# Patient Record
Sex: MALE | Race: WHITE | NOT HISPANIC OR LATINO | Employment: FULL TIME | URBAN - METROPOLITAN AREA
[De-identification: names, ages, dates, MRNs, and addresses within clinical notes are randomized per-mention and may not be internally consistent; named-entity substitution may affect disease eponyms.]

---

## 2017-01-16 ENCOUNTER — OFFICE VISIT (OUTPATIENT)
Dept: FAMILY MEDICINE | Facility: CLINIC | Age: 62
End: 2017-01-16
Payer: COMMERCIAL

## 2017-01-16 VITALS
BODY MASS INDEX: 33.47 KG/M2 | WEIGHT: 226 LBS | SYSTOLIC BLOOD PRESSURE: 130 MMHG | HEIGHT: 69 IN | HEART RATE: 66 BPM | DIASTOLIC BLOOD PRESSURE: 82 MMHG | TEMPERATURE: 98.5 F | OXYGEN SATURATION: 97 %

## 2017-01-16 DIAGNOSIS — J01.01 ACUTE RECURRENT MAXILLARY SINUSITIS: Primary | ICD-10-CM

## 2017-01-16 PROCEDURE — 99213 OFFICE O/P EST LOW 20 MIN: CPT | Performed by: PHYSICIAN ASSISTANT

## 2017-01-16 RX ORDER — AZITHROMYCIN 250 MG/1
TABLET, FILM COATED ORAL
Qty: 6 TABLET | Refills: 0 | Status: SHIPPED | OUTPATIENT
Start: 2017-01-16 | End: 2017-02-09

## 2017-01-16 NOTE — NURSING NOTE
"Chief Complaint   Patient presents with     Nasal Congestion       Initial /82 mmHg  Pulse 66  Temp(Src) 98.5  F (36.9  C) (Oral)  Ht 5' 9\" (1.753 m)  Wt 226 lb (102.513 kg)  BMI 33.36 kg/m2  SpO2 97% Estimated body mass index is 33.36 kg/(m^2) as calculated from the following:    Height as of this encounter: 5' 9\" (1.753 m).    Weight as of this encounter: 226 lb (102.513 kg).  BP completed using cuff size: regular    Darlene Kat MA      "

## 2017-01-16 NOTE — PATIENT INSTRUCTIONS
-Tdap due in May   -Afrin: twice daily for 3 days  Sinusitis (Antibiotic Treatment)    The sinuses are air-filled spaces within the bones of the face. They connect to the inside of the nose. Sinusitis is an inflammation of the tissue lining the sinus cavity. Sinus inflammation can occur during a cold. It can also be due to allergies to pollens and other particles in the air. Sinusitis can cause symptoms of sinus congestion and fullness. A sinus infection causes fever, headache and facial pain. There is often green or yellow drainage from the nose or into the back of the throat (post-nasal drip). You have been given antibiotics to treat this condition.  Home care:    Take the full course of antibiotics as instructed. Do not stop taking them, even if you feel better.    Drink plenty of water, hot tea, and other liquids. This may help thin mucus. It also may promote sinus drainage.    Heat may help soothe painful areas of the face. Use a towel soaked in hot water. Or,  the shower and direct the hot spray onto your face. Using a vaporizer along with a menthol rub at night may also help.     An expectorant containing guaifenesin may help thin the mucus and promote drainage from the sinuses.    Over-the-counter decongestants may be used unless a similar medicine was prescribed. Nasal sprays work the fastest. Use one that contains phenylephrine or oxymetazoline. First blow the nose gently. Then use the spray. Do not use these medicines more often than directed on the label or symptoms may get worse. You may also use tablets containing pseudoephedrine. Avoid products that combine ingredients, because side effects may be increased. Read labels. You can also ask the pharmacist for help. (NOTE: Persons with high blood pressure should not use decongestants. They can raise blood pressure.)    Over-the-counter antihistamines may help if allergies contributed to your sinusitis.      Do not use nasal rinses or irrigation  during an acute sinus infection, unless told to by your health care provider. Rinsing may spread the infection to other sinuses.    Use acetaminophen or ibuprofen to control pain, unless another pain medicine was prescribed. (If you have chronic liver or kidney disease or ever had a stomach ulcer, talk with your doctor before using these medicines. Aspirin should never be used in anyone under 18 years of age who is ill with a fever. It may cause severe liver damage.)    Don't smoke. This can worsen symptoms.  Follow-up care  Follow up with your healthcare provider or our staff if you are not improving within the next week.  When to seek medical advice  Call your healthcare provider if any of these occur:    Facial pain or headache becoming more severe    Stiff neck    Unusual drowsiness or confusion    Swelling of the forehead or eyelids    Vision problems, including blurred or double vision    Fever of 100.4 F (38 C) or higher, or as directed by your healthcare provider    Seizure    Breathing problems    Symptoms not resolving within 10 days    4797-1285 [employer]. [address] [place] [address], [place] [address]. All rights reserved. This information is not intended as a substitute for professional medical care. Always follow your healthcare professional's instructions.

## 2017-01-16 NOTE — MR AVS SNAPSHOT
After Visit Summary   1/16/2017    Nick Villalobos    MRN: [id number]           Patient Information     Date Of Birth          1955        Visit Information        Provider Department      1/16/2017 1:40 PM Iv[facility] Joiner [facility]-C [facility]        Today's Diagnoses     Acute recurrent maxillary sinusitis    -  1       Care Instructions      -Tdap due in May   -Afrin: twice daily for 3 days  Sinusitis (Antibiotic Treatment)    The sinuses are air-filled spaces within the bones of the face. They connect to the inside of the nose. Sinusitis is an inflammation of the tissue lining the sinus cavity. Sinus inflammation can occur during a cold. It can also be due to allergies to pollens and other particles in the air. Sinusitis can cause symptoms of sinus congestion and fullness. A sinus infection causes fever, headache and facial pain. There is often green or yellow drainage from the nose or into the back of the throat (post-nasal drip). You have been given antibiotics to treat this condition.  Home care:    Take the full course of antibiotics as instructed. Do not stop taking them, even if you feel better.    Drink plenty of water, hot tea, and other liquids. This may help thin mucus. It also may promote sinus drainage.    Heat may help soothe painful areas of the face. Use a towel soaked in hot water. Or,  the shower and direct the hot spray onto your face. Using a vaporizer along with a menthol rub at night may also help.     An expectorant containing guaifenesin may help thin the mucus and promote drainage from the sinuses.    Over-the-counter decongestants may be used unless a similar medicine was prescribed. Nasal sprays work the fastest. Use one that contains phenylephrine or oxymetazoline. First blow the nose gently. Then use the spray. Do not use these medicines more often than directed on the label or symptoms may get worse. You may also use tablets containing  pseudoephedrine. Avoid products that combine ingredients, because side effects may be increased. Read labels. You can also ask the pharmacist for help. (NOTE: Persons with high blood pressure should not use decongestants. They can raise blood pressure.)    Over-the-counter antihistamines may help if allergies contributed to your sinusitis.      Do not use nasal rinses or irrigation during an acute sinus infection, unless told to by your health care provider. Rinsing may spread the infection to other sinuses.    Use acetaminophen or ibuprofen to control pain, unless another pain medicine was prescribed. (If you have chronic liver or kidney disease or ever had a stomach ulcer, talk with your doctor before using these medicines. Aspirin should never be used in anyone under 18 years of age who is ill with a fever. It may cause severe liver damage.)    Don't smoke. This can worsen symptoms.  Follow-up care  Follow up with your healthcare provider or our staff if you are not improving within the next week.  When to seek medical advice  Call your healthcare provider if any of these occur:    Facial pain or headache becoming more severe    Stiff neck    Unusual drowsiness or confusion    Swelling of the forehead or eyelids    Vision problems, including blurred or double vision    Fever of 100.4 F (38 C) or higher, or as directed by your healthcare provider    Seizure    Breathing problems    Symptoms not resolving within 10 days    4255-8377 [employer]. [address] [place] [address], [place] [address]. All rights reserved. This information is not intended as a substitute for professional medical care. Always follow your healthcare professional's instructions.              Follow-ups after your visit        Who to contact     If you have questions or need follow up information about today's clinic visit or your schedule please contact [facility] directly at [phone].  Normal or non-critical lab and  "imaging results will be communicated to you by MyChart, letter or phone within 4 business days after the clinic has received the results. If you do not hear from us within 7 days, please contact the clinic through [employer] or phone. If you have a critical or abnormal lab result, we will notify you by phone as soon as possible.  Submit refill requests through i[employer] or call your pharmacy and they will forward the refill request to us. Please allow 3 business days for your refill to be completed.          Additional Information About Your Visit        iNEWiT Information     iNEW[employer]T lets you send messages to your doctor, view your test results, renew your prescriptions, schedule appointments and more. To sign up, go to [email or web address] . Click on \"Log in\" on the left side of the screen, which will take you to the Welcome page. Then click on \"Sign up Now\" on the right side of the page.     You will be asked to enter the access code listed below, as well as some personal information. Please follow the directions to create your username and password.     Your access code is: [email or web address]  Expires: 2017  2:03 PM     Your access code will  in 90 days. If you need help or a new code, please call your [facility] clinic or [phone].        Care EveryWhere ID     This is your Care EveryWhere ID. This could be used by other organizations to access your [facility] medical records  [id number]        Your Vitals Were     Pulse Temperature Height BMI (Body Mass Index) Pulse Oximetry       66 98.5  F (36.9  C) (Oral) 5' 9\" (1.753 m) 33.36 kg/m2 97%        Blood Pressure from Last 3 Encounters:   17 130/82   10/06/16 110/60   16 122/68    Weight from Last 3 Encounters:   17 226 lb (102.513 kg)   10/06/16 213 lb (96.616 kg)   16 207 lb (93.895 kg)              Today, you had the following     No orders found for display         Today's Medication Changes          These changes are accurate " as of: 1/16/17  2:03 PM.  If you have any questions, ask your nurse or doctor.               Start taking these medicines.        Dose/Directions    azithromycin 250 MG tablet   Commonly known as:  ZITHROMAX   Used for:  Acute recurrent maxillary sinusitis   Started by:  Ivory Joiner PA-C        Two tablets first day, then one tablet daily for four days.   Quantity:  6 tablet   Refills:  0            Where to get your medicines      These medications were sent to [facility] [address] [facility] - [place] - [address]  [address], [place] [address]     Phone:  [phone]    - azithromycin 250 MG tablet             Primary Care Provider Office Phone # Fax #    Karen Weiler, [phone]       [facility] [address] [facility] [address]        Thank you!     Thank you for choosing [facility]  for your care. Our goal is always to provide you with excellent care. Hearing back from our patients is one way we can continue to improve our services. Please take a few minutes to complete the written survey that you may receive in the mail after your visit with us. Thank you!             Your Updated Medication List - Protect others around you: Learn how to safely use, store and throw away your medicines at www.disposemymeds.org.          This list is accurate as of: 1/16/17  2:03 PM.  Always use your most recent med list.                   Brand Name Dispense Instructions for use    azithromycin 250 MG tablet    ZITHROMAX    6 tablet    Two tablets first day, then one tablet daily for four days.       diazepam 5 MG tablet    VALIUM    30 tablet    Take 1 tablet (5 mg) by mouth every 8 hours as needed for anxiety or sleep (muscle spasm)

## 2017-01-16 NOTE — PROGRESS NOTES
SUBJECTIVE:                                                    Nick Villalobos is a 61 year old male who presents to clinic today for the following health issues:    Acute Illness   Acute illness concerns: flu, congestion  Onset: 6 days     Fever: YES- low grade. Max 99    Chills/Sweats: no    Headache (location?): YES    Sinus Pressure:YES - teeth pain    Conjunctivitis:  no    Ear Pain: YES- itching    Rhinorrhea: YES - off and on    Congestion: YES    Sore Throat: no, but had at the beginning     Cough: YES-productive of green sputum    Wheeze: YES    Decreased Appetite: no    Nausea: no    Vomiting: no    Diarrhea:  no    Dysuria/Freq.: no    Fatigue/Achiness: YES    Sick/Strep Exposure: YES- people sick at work     Therapies Tried and outcome: mucinex DM, waylon seltzer, ibuprofen, vitamins    Symptoms worsening rather than improving.  Tooth pain x2 days  Facial pain worse with leaning forward  Feels short of breath    No recent travel.    Sick contacts at work  Wife had cold symptoms a few weeks ago.    Patient reports history of sinus infections.    Problem list and histories reviewed & adjusted, as indicated.  Additional history: as documented    Patient Active Problem List   Diagnosis     Family history of malignant neoplasm of prostate     Esophageal reflux     Advanced directives, counseling/discussion     Colon polyps     Hyperlipidemia LDL goal <160     Sinus bradycardia     Past Surgical History   Procedure Laterality Date     Surgical history of -        tendon repair in right hand     Hc colonoscopy thru stoma, diagnostic  6/15/07     Hyperplastic Polyp.  Needs repeat in 5 years       Social History   Substance Use Topics     Smoking status: Former Smoker -- 30 years     Quit date: 12/20/2001     Smokeless tobacco: Never Used     Alcohol Use: Yes      Comment: 2x per month     Family History   Problem Relation Age of Onset     Prostate Cancer Father      Cancer - colorectal Father      C.A.D.  "Father       at 67, presumed MI     Hypertension Father      CEREBROVASCULAR DISEASE Mother      at 64         Current Outpatient Prescriptions   Medication Sig Dispense Refill     azithromycin (ZITHROMAX) 250 MG tablet Two tablets first day, then one tablet daily for four days. 6 tablet 0     diazepam (VALIUM) 5 MG tablet Take 1 tablet (5 mg) by mouth every 8 hours as needed for anxiety or sleep (muscle spasm) 30 tablet 0     No Known Allergies    ROS:  Constitutional, HEENT, cardiovascular, pulmonary, gi and gu systems are negative, except as otherwise noted.    OBJECTIVE:                                                    /82 mmHg  Pulse 66  Temp(Src) 98.5  F (36.9  C) (Oral)  Ht 5' 9\" (1.753 m)  Wt 226 lb (102.513 kg)  BMI 33.36 kg/m2  SpO2 97%  Body mass index is 33.36 kg/(m^2).  GENERAL: healthy, alert and no distress  EYES: Eyes grossly normal to inspection, PERRL and conjunctivae and sclerae normal  HENT: normal cephalic/atraumatic, ear canals and TM's normal, nose and mouth without ulcers or lesions, oropharynx clear, oral mucous membranes moist and sinuses: maxillary tenderness on both sides  NECK: no adenopathy, no asymmetry, masses, or scars and thyroid normal to palpation  RESP: expiratory wheezes L upper posterior. No rales or rhonchi.  CV: regular rate and rhythm, normal S1 S2, no S3 or S4, no murmur, click or rub, no peripheral edema and peripheral pulses strong  MS: no gross musculoskeletal defects noted, no edema  SKIN: Superficial abrasion superior to left eyebrow    Diagnostic Test Results:  none      ASSESSMENT/PLAN:                                                      1. Acute recurrent maxillary sinusitis  Symptoms worsening despite OTC medications. Tooth pain is concerning for sinus infection. Will start antibiotic. Patient advised to continue with OTC medications to help with symptoms. Follow-up if no improvement with treatment.  - azithromycin (ZITHROMAX) 250 MG tablet; Two " tablets first day, then one tablet daily for four days.  Dispense: 6 tablet; Refill: 0    Patient notified that Tdap due this year.    See Patient Instructions    Ivory Joiner PA-C  [facility]

## 2017-01-20 ENCOUNTER — TELEPHONE (OUTPATIENT)
Dept: FAMILY MEDICINE | Facility: CLINIC | Age: 62
End: 2017-01-20

## 2017-01-20 NOTE — Clinical Note
January 20, 2017      Nick Villalobos  [address] [address]          To Whom it May Concern,    I evaluated Nick Villalobos on 1/16/17. He was unable to attend work from 1/16/17 through 1/20/17 due to illness. He is able to return to work on Monday, January 23rd.    Sincerely,    Ivory Joiner PA-C

## 2017-01-20 NOTE — TELEPHONE ENCOUNTER
I would not recommend a refill on the azithromycin at this time. I would advise continued use of OTC medications to help with symptoms.  I will write a letter for work for him.    If his symptoms are not improving over the next 4-5 days, he should contact the clinic with an update, and we can determine if further treatment or evaluation is indicated.    Letter is at [address]. Please fax to number listed.    Ivory Joiner PA-C

## 2017-01-20 NOTE — TELEPHONE ENCOUNTER
I reviewed below with patient. He still really wants antibiotics, I explained to him rationale for not continuing to give antibiotics. He says he doesn't understand why we can't do this over the phone. Explained it is for his safety and he needs to let the antibiotics he has taken a chance to work. Offered him appointment for follow up but he declined. He was pleasant but still expressed his that he feels we should sent in another round of antibiotics for him. Reviewed with DONNA and RICO--no plan change.     Advised patient to call us back in 4-5 days if he is not better or to call us if he is worsening. Patient verbalized understanding. Analia Rosenberg R.N.

## 2017-01-20 NOTE — TELEPHONE ENCOUNTER
Pt calling as she is going to finish his Z-pack today and is asking of he can get a refill of it as he is not felling better.  He states he is aware it still works for 5 days after he is done taking it, but he is still coughing quite a bit.  He uses [facility] in [place].  He can be reached at [phone].  Also, he has missed work, so he needs a note saying he is okay to go back to work and to cover the days he missed.  He missed Monday-Friday this week.  Asking we fax that to his wife at [phone].  OK to leave detailed message.  Ange Ellsworth  Patient

## 2017-02-09 ENCOUNTER — OFFICE VISIT (OUTPATIENT)
Dept: FAMILY MEDICINE | Facility: CLINIC | Age: 62
End: 2017-02-09
Payer: COMMERCIAL

## 2017-02-09 VITALS
BODY MASS INDEX: 33.18 KG/M2 | HEART RATE: 79 BPM | TEMPERATURE: 98.5 F | OXYGEN SATURATION: 99 % | HEIGHT: 69 IN | SYSTOLIC BLOOD PRESSURE: 128 MMHG | DIASTOLIC BLOOD PRESSURE: 80 MMHG | WEIGHT: 224 LBS

## 2017-02-09 DIAGNOSIS — J01.00 ACUTE NON-RECURRENT MAXILLARY SINUSITIS: Primary | ICD-10-CM

## 2017-02-09 PROCEDURE — 99213 OFFICE O/P EST LOW 20 MIN: CPT | Performed by: FAMILY MEDICINE

## 2017-02-09 RX ORDER — PREDNISONE 20 MG/1
20 TABLET ORAL DAILY
Qty: 5 TABLET | Refills: 0 | Status: SHIPPED | OUTPATIENT
Start: 2017-02-09 | End: 2017-07-14

## 2017-02-09 RX ORDER — AZITHROMYCIN 250 MG/1
TABLET, FILM COATED ORAL
Qty: 6 TABLET | Refills: 0 | Status: SHIPPED | OUTPATIENT
Start: 2017-02-09 | End: 2017-07-14

## 2017-02-09 NOTE — NURSING NOTE
"Chief Complaint   Patient presents with     URI       Initial /80 mmHg  Pulse 79  Temp(Src) 98.5  F (36.9  C) (Oral)  Ht 5' 9\" (1.753 m)  Wt 224 lb (101.606 kg)  BMI 33.06 kg/m2  SpO2 99% Estimated body mass index is 33.06 kg/(m^2) as calculated from the following:    Height as of this encounter: 5' 9\" (1.753 m).    Weight as of this encounter: 224 lb (101.606 kg).  Medication Reconciliation: complete   Portia Vilchis Medical Assistant      "

## 2017-02-09 NOTE — ASSESSMENT & PLAN NOTE
Advance Care Planning 2/9/2017: ACP Review of Chart / Resources Provided:  Reviewed chart for advance care plan.  Nick Villalobos has no plan or code status on file. Discussed available resources and provided with information. Confirmed code status reflects current choices pending further ACP discussions.  Confirmed/documented legally designated decision makers.  Added by Portia Vilchis

## 2017-02-09 NOTE — PROGRESS NOTES
"  SUBJECTIVE:                                                    Nick Villalobos is a 61 year old male who presents to clinic today for the following health issues:    Has a about a 3-4 week history of sinus pain, nasal congestion. Bad sore throat.  Productive cough.  Green nasal congestion.  Having some wheezing.  Having some subjective fevers.  Multiple ill contacts at work. Was on Zithromax in 1/16/2017, felt a bit better, but symptoms got worse.  No nausea or vomiting.  Going to [place] so wants to feel better. Is also having some neck and back pain due to cough.  OTC cough medicine did not help.     Problem list and histories reviewed & adjusted, as indicated.  Additional history: as documented    ROS:  Constitutional, HEENT, cardiovascular, pulmonary, gi and gu systems are negative, except as otherwise noted.     OBJECTIVE:                                                    /80 mmHg  Pulse 79  Temp(Src) 98.5  F (36.9  C) (Oral)  Ht 5' 9\" (1.753 m)  Wt 224 lb (101.606 kg)  BMI 33.06 kg/m2  SpO2 99%  Body mass index is 33.06 kg/(m^2).  GENERAL: healthy, alert and no distress  EYES: Eyes grossly normal to inspection, PERRL and conjunctivae and sclerae normal  HENT: ear canals and TM's normal, nose and mouth without ulcers or lesions.  +maxillary sinus tenderness  NECK: no adenopathy, no asymmetry, masses, or scars and thyroid normal to palpation  RESP: lungs clear to auscultation - no rales, rhonchi or wheezes  CV: regular rate and rhythm, normal S1 S2, no S3 or S4, no murmur, click or rub, no peripheral edema and peripheral pulses strong  ABDOMEN: soft, nontender, no hepatosplenomegaly, no masses and bowel sounds normal  MS: no gross musculoskeletal defects noted, no edema         ASSESSMENT/PLAN:                                                        ICD-10-CM    1. Acute non-recurrent maxillary sinusitis J01.00 azithromycin (ZITHROMAX) 250 MG tablet     predniSONE (DELTASONE) 20 MG tablet "         Karen Weiler, MD  [facility]

## 2017-03-23 DIAGNOSIS — M25.562 LEFT KNEE PAIN, UNSPECIFIED CHRONICITY: ICD-10-CM

## 2017-03-23 RX ORDER — DIAZEPAM 5 MG
5 TABLET ORAL EVERY 8 HOURS PRN
Qty: 30 TABLET | Refills: 0 | Status: SHIPPED | OUTPATIENT
Start: 2017-03-23 | End: 2017-07-20

## 2017-03-23 NOTE — TELEPHONE ENCOUNTER
Controlled Substance Refill Request for diazepam  Problem List Complete:  Yes    Last Written Prescription Date:  1/30/2016  Last Fill Quantity: 30,   # refills: 0    [place] [facility] [place]: 2/9/2017    Clinic visit frequency required: not listed     Future Office visit:     Controlled substance agreement on file: No.     Processing:  Fax Rx to pended pharmacy.  Please call pt at [phone].  OK to leave detailed message.  Ange Ellsworth  Patient

## 2017-07-14 ENCOUNTER — OFFICE VISIT (OUTPATIENT)
Dept: FAMILY MEDICINE | Facility: CLINIC | Age: 62
End: 2017-07-14
Payer: COMMERCIAL

## 2017-07-14 ENCOUNTER — RADIANT APPOINTMENT (OUTPATIENT)
Dept: GENERAL RADIOLOGY | Facility: CLINIC | Age: 62
End: 2017-07-14
Attending: FAMILY MEDICINE
Payer: COMMERCIAL

## 2017-07-14 VITALS
HEART RATE: 72 BPM | BODY MASS INDEX: 30.36 KG/M2 | WEIGHT: 205 LBS | SYSTOLIC BLOOD PRESSURE: 120 MMHG | DIASTOLIC BLOOD PRESSURE: 66 MMHG | TEMPERATURE: 98.7 F | OXYGEN SATURATION: 98 % | HEIGHT: 69 IN

## 2017-07-14 DIAGNOSIS — S82.002A CLOSED NONDISPLACED FRACTURE OF LEFT PATELLA, UNSPECIFIED FRACTURE MORPHOLOGY, INITIAL ENCOUNTER: ICD-10-CM

## 2017-07-14 DIAGNOSIS — M25.562 ACUTE PAIN OF LEFT KNEE: Primary | ICD-10-CM

## 2017-07-14 DIAGNOSIS — M25.562 ACUTE PAIN OF LEFT KNEE: ICD-10-CM

## 2017-07-14 PROCEDURE — 73562 X-RAY EXAM OF KNEE 3: CPT | Mod: LT

## 2017-07-14 PROCEDURE — 99213 OFFICE O/P EST LOW 20 MIN: CPT | Performed by: FAMILY MEDICINE

## 2017-07-14 RX ORDER — OXYCODONE AND ACETAMINOPHEN 5; 325 MG/1; MG/1
1 TABLET ORAL EVERY 4 HOURS PRN
Qty: 40 TABLET | Refills: 0 | Status: SHIPPED | OUTPATIENT
Start: 2017-07-14 | End: 2017-07-20

## 2017-07-14 NOTE — NURSING NOTE
"Chief Complaint   Patient presents with     Knee Pain     fell this morning on left knee on concrete      Initial /66 (BP Location: Right arm, Patient Position: Chair, Cuff Size: Adult Large)  Pulse 72  Temp 98.7  F (37.1  C) (Oral)  Ht 5' 9\" (1.753 m)  Wt 205 lb (93 kg)  SpO2 98%  BMI 30.27 kg/m2 Estimated body mass index is 30.27 kg/(m^2) as calculated from the following:    Height as of this encounter: 5' 9\" (1.753 m).    Weight as of this encounter: 205 lb (93 kg).  BP completed using cuff size large right Arm  [facility] CMA    "

## 2017-07-14 NOTE — MR AVS SNAPSHOT
"              After Visit Summary   2017    Nick Villalobos    MRN: [id number]           Patient Information     Date Of Birth          1955        Visit Information        Provider Department      2017 2:20 PM Weiler, Karen, MD [facility], [place]        Today's Diagnoses     Acute pain of left knee    -  1    Closed nondisplaced fracture of left patella, unspecified fracture morphology, initial encounter           Follow-ups after your visit        Who to contact     If you have questions or need follow up information about today's clinic visit or your schedule please contact [facility] [place] directly at [phone].  Normal or non-critical lab and imaging results will be communicated to you by MyChart, letter or phone within 4 business days after the clinic has received the results. If you do not hear from us within 7 days, please contact the clinic through [employer] or phone. If you have a critical or abnormal lab result, we will notify you by phone as soon as possible.  Submit refill requests through [employer] or call your pharmacy and they will forward the refill request to us. Please allow 3 business days for your refill to be completed.          Additional Information About Your Visit        MyChart Information     [employer] lets you send messages to your doctor, view your test results, renew your prescriptions, schedule appointments and more. To sign up, go to [email or web address] . Click on \"Log in\" on the left side of the screen, which will take you to the Welcome page. Then click on \"Sign up Now\" on the right side of the page.     You will be asked to enter the access code listed below, as well as some personal information. Please follow the directions to create your username and password.     Your access code is: [email or web address]  Expires: 10/12/2017  3:47 PM     Your access code will  in 90 days. If you need help or a new code, please call your [facility] or " "[phone].        Care EveryWhere ID     This is your Care EveryWhere ID. This could be used by other organizations to access your [facility] medical records  [id number]        Your Vitals Were     Pulse Temperature Height Pulse Oximetry BMI (Body Mass Index)       72 98.7  F (37.1  C) (Oral) 5' 9\" (1.753 m) 98% 30.27 kg/m2        Blood Pressure from Last 3 Encounters:   07/14/17 120/66   02/09/17 128/80   01/16/17 130/82    Weight from Last 3 Encounters:   07/14/17 205 lb (93 kg)   02/09/17 224 lb (101.6 kg)   01/16/17 226 lb (102.5 kg)                 Today's Medication Changes          These changes are accurate as of: 7/14/17  3:47 PM.  If you have any questions, ask your nurse or doctor.               Start taking these medicines.        Dose/Directions    oxyCODONE-acetaminophen 5-325 MG per tablet   Commonly known as:  PERCOCET   Used for:  Acute pain of left knee, Closed nondisplaced fracture of left patella, unspecified fracture morphology, initial encounter   Started by:  Weiler, Karen, MD        Dose:  1 tablet   Take 1 tablet by mouth every 4 hours as needed for pain maximum 5 tablet(s) per day   Quantity:  40 tablet   Refills:  0            Where to get your medicines      Some of these will need a paper prescription and others can be bought over the counter.  Ask your nurse if you have questions.     Bring a paper prescription for each of these medications     oxyCODONE-acetaminophen 5-325 MG per tablet                Primary Care Provider Office Phone # Fax #    Karen Weiler, [phone]       [facility] [address] [facility] [address]        Equal Access to Services     [facility]: Hadii aleta chandler Somarc, waaxda luqadaha, qaybta kaalmada moo, abel cheema. So [facility] [phone].    ATENCIÓN: Si habla español, tiene a padilla disposición servicios gratuitos de asistencia lingüística. Llame al [phone].    We comply with " applicable federal civil rights laws and [place] laws. We do not discriminate on the basis of race, color, national origin, age, disability sex, sexual orientation or gender identity.            Thank you!     Thank you for choosing [facility] [place]  for your care. Our goal is always to provide you with excellent care. Hearing back from our patients is one way we can continue to improve our services. Please take a few minutes to complete the written survey that you may receive in the mail after your visit with us. Thank you!             Your Updated Medication List - Protect others around you: Learn how to safely use, store and throw away your medicines at www.disposemymeds.org.          This list is accurate as of: 7/14/17  3:47 PM.  Always use your most recent med list.                   Brand Name Dispense Instructions for use Diagnosis    diazepam 5 MG tablet    VALIUM    30 tablet    Take 1 tablet (5 mg) by mouth every 8 hours as needed for anxiety or sleep (muscle spasm)    Left knee pain, unspecified chronicity       oxyCODONE-acetaminophen 5-325 MG per tablet    PERCOCET    40 tablet    Take 1 tablet by mouth every 4 hours as needed for pain maximum 5 tablet(s) per day    Acute pain of left knee, Closed nondisplaced fracture of left patella, unspecified fracture morphology, initial encounter

## 2017-07-14 NOTE — PROGRESS NOTES
"  SUBJECTIVE:                                                    Nick Villalobos is a 61 year old male who presents to clinic today for the following health issues:    Knee Pain    Onset: this morning    Description:   Location: left knee  Character: Sharp    Intensity: 11/10    Progression of Symptoms: worse    Accompanying Signs & Symptoms:  Other symptoms: none    History:   Previous similar pain: no       Precipitating factors:   Trauma or overuse: YES- fell on knee this morning on hard concrete     Alleviating factors:  Improved by: nothing    Therapies Tried and outcome: ice, wrap, with no improvement       Pt went to check a bird's nest and they flew at him, he ran away, tripped and fell on left knee on hard concrete. Pt did not scrape his skin, but has internal pain, he has tried icing with little relief. Pain is exacerbated with bending and with walking on it.      Problem list and histories reviewed & adjusted, as indicated.  Additional history: as documented        ROS:  Constitutional, HEENT, cardiovascular, pulmonary, gi and gu systems are negative, except as otherwise noted.    This document serves as a record of the services and decisions personally performed and made by Karen Weiler, MD. It was created on her behalf by Elliott Joiner, a trained medical scribe. The creation of this document is based on the provider's statements to the medical scribe.  Elliott Joiner 3:41 PM July 14, 2017    OBJECTIVE:     /66 (BP Location: Right arm, Patient Position: Chair, Cuff Size: Adult Large)  Pulse 72  Temp 98.7  F (37.1  C) (Oral)  Ht 1.753 m (5' 9\")  Wt 93 kg (205 lb)  SpO2 98%  BMI 30.27 kg/m2  Body mass index is 30.27 kg/(m^2).  GENERAL: healthy, alert and no distress  NECK: no adenopathy, no asymmetry, masses, or scars and thyroid normal to palpation  RESP: lungs clear to auscultation - no rales, rhonchi or wheezes  CV: regular rate and rhythm, normal S1 S2, no S3 or S4, no murmur, click or " rub, no peripheral edema and peripheral pulses strong  ABDOMEN: soft, nontender, no hepatosplenomegaly, no masses and bowel sounds normal  MS: Left knee: Warm, erythematous and tender area surrounding patella region. Pain with flexion and extension of the knee.     Diagnostic Test Results:  Xray of left knee- nondisplaced hairline fracture of patella per my interpretation.  ASSESSMENT/PLAN:     (M25.562) Acute pain of left knee  (primary encounter diagnosis)  Comment: hairline fracture of the patella.  Will prescribe Percocet for pain control, advised pt to take Ibuprofen as needed as well.  Plan: XR Knee Left 3 Views, oxyCODONE-acetaminophen         (PERCOCET) 5-325 MG per tablet        Follow up in 2 weeks for another XR.    (S82.002A) Closed nondisplaced fracture of left patella, unspecified fracture morphology, initial encounter  Comment: XR showed patella hairline fracture, radiologist informed us it should heal on its own, will do another XR in 2 weeks. Will prescribe Percocet for pain, advised pt to continue to take Ibuprofen as needed as well. Advised pt to take elevator at work, avoid stairs, use crutches, compression sleeve, elevate and ice leg at home and stay off left knee as much as possible. If pain is still persistent in 2 weeks can do MRI if needed.  Plan: oxyCODONE-acetaminophen (PERCOCET) 5-325 MG per        tablet        Follow up in 2 weeks for another XR of left knee.    The information in this document, created by the medical scribe for me, accurately reflects the services I personally performed and the decisions made by me. I have reviewed and approved this document for accuracy prior to leaving the patient care area.  July 14, 2017 3:41 PM    Karen Weiler, MD  [facility]

## 2017-07-20 DIAGNOSIS — S82.002A CLOSED NONDISPLACED FRACTURE OF LEFT PATELLA, UNSPECIFIED FRACTURE MORPHOLOGY, INITIAL ENCOUNTER: ICD-10-CM

## 2017-07-20 DIAGNOSIS — M25.562 LEFT KNEE PAIN, UNSPECIFIED CHRONICITY: ICD-10-CM

## 2017-07-20 DIAGNOSIS — M25.562 ACUTE PAIN OF LEFT KNEE: ICD-10-CM

## 2017-07-20 NOTE — TELEPHONE ENCOUNTER
Patient stopped in clinic, and called to check on refill request. Would like by end of day due to knee pain.    Frank Carmona  Patient Representative-[facility]

## 2017-07-20 NOTE — TELEPHONE ENCOUNTER
Please call Nick at [phone] when rx is ready to  at . (Ok to leave detailed message).    Refill request received via: Patient came into clinic and is requesting a refill  Patient requesting refill for: oxyCODONE-acetaminophen (PERCOCET) 5-325 MG per tablet, diazepam (VALIUM) 5 MG tablet  Last Office Visit: 07/14/2017  Last Refill (see below):    diazepam (VALIUM) 5 MG tablet 30 tablet 0 3/23/2017  No   Sig: Take 1 tablet (5 mg) by mouth every 8 hours as needed for anxiety or sleep (muscle spasm)   Class: Local Print   Route: Oral   Order: [id number]     oxyCODONE-acetaminophen (PERCOCET) 5-325 MG per tablet 40 tablet 0 7/14/2017  --   Sig: Take 1 tablet by mouth every 4 hours as needed for pain maximum 5 tablet(s) per day   Class: Local Print   Route: Oral   Order: [id number]     Monica Ni [facility]-Patient Representative

## 2017-07-20 NOTE — TELEPHONE ENCOUNTER
oxyCODONE-acetaminophen (PERCOCET) 5-325 MG per tablet      Last Written Prescription Date:  7/14/2017  Last Fill Quantity: 40 tablet,   # refills: 0  Last Office Visit with [facility], [facility] or  Health prescribing provider: 7/14/2017  Future Office visit:    Next 5 appointments (look out 90 days)     Aug 04, 2017  3:00 PM CDT   [facility]    [address]  [facility] [address]   [phone]                   Routing refill request to provider for review/approval because:  Drug not on the [facility] or [facility] refill protocol or controlled substance      diazepam (VALIUM) 5 MG tablet      Last Written Prescription Date:  3/23/2017  Last Fill Quantity: 30 tablet,   # refills: 0  Last Office Visit with [facility] or  [facility] prescribing provider: 7/14/2017  Future Office visit:    Next 5 appointments (look out 90 days)     Aug 04, 2017  3:00 PM CDT   [facility]    [address]  [facility] [address]   [phone]                   Routing refill request to provider for review/approval because:  Drug not on the [facility] or [facility] refill protocol or controlled substance

## 2017-07-21 RX ORDER — DIAZEPAM 5 MG
5 TABLET ORAL EVERY 8 HOURS PRN
Qty: 30 TABLET | Refills: 0 | Status: SHIPPED | OUTPATIENT
Start: 2017-07-21 | End: 2017-10-23

## 2017-07-21 RX ORDER — OXYCODONE AND ACETAMINOPHEN 5; 325 MG/1; MG/1
1 TABLET ORAL EVERY 4 HOURS PRN
Qty: 40 TABLET | Refills: 0 | Status: SHIPPED | OUTPATIENT
Start: 2017-07-21 | End: 2017-07-27

## 2017-07-27 DIAGNOSIS — M25.562 ACUTE PAIN OF LEFT KNEE: ICD-10-CM

## 2017-07-27 DIAGNOSIS — S82.002A CLOSED NONDISPLACED FRACTURE OF LEFT PATELLA, UNSPECIFIED FRACTURE MORPHOLOGY, INITIAL ENCOUNTER: ICD-10-CM

## 2017-07-27 NOTE — TELEPHONE ENCOUNTER
Please call patient at [phone] when rx is ready to  at .  (Ok to leave detailed message).    Refill request received via: phone  Patient requesting refill for: oxy.    Last Office Visit: 7/14/17  Last Refill (see below):    oxyCODONE-acetaminophen (PERCOCET) 5-325 MG per tablet 40 tablet 0 7/21/2017  No   Sig: Take 1 tablet by mouth every 4 hours as needed for pain maximum 5 tablet(s) per day   Class: Local Print   Route: Oral   Order: [id number]     Frank Carmona  Patient Representative-[facility]

## 2017-07-28 RX ORDER — OXYCODONE AND ACETAMINOPHEN 5; 325 MG/1; MG/1
1 TABLET ORAL EVERY 4 HOURS PRN
Qty: 40 TABLET | Refills: 0 | Status: SHIPPED | OUTPATIENT
Start: 2017-07-28 | End: 2017-08-04

## 2017-07-28 NOTE — TELEPHONE ENCOUNTER
oxyCODONE-acetaminophen (PERCOCET) 5-325 MG per tablet  Not due for refill until 8/1/2017      Last Written Prescription Date:  7/21/2017  Last Fill Quantity: 40 tablet,   # refills: 0  Last Office Visit with [facility] or [facility] prescribing provider: 7/14/2017  Future Office visit:    Next 5 appointments (look out 90 days)     Aug 04, 2017  3:00 PM CDT   Office Visit with Karen Weiler, MD   [facility] [place] ([facility]    [address]  [place] [address] [phone]                   Routing refill request to provider for review/approval because:  Drug not on the [facility] or [facility] refill protocol or controlled substance

## 2017-08-04 ENCOUNTER — OFFICE VISIT (OUTPATIENT)
Dept: FAMILY MEDICINE | Facility: CLINIC | Age: 62
End: 2017-08-04
Payer: COMMERCIAL

## 2017-08-04 ENCOUNTER — RADIANT APPOINTMENT (OUTPATIENT)
Dept: GENERAL RADIOLOGY | Facility: CLINIC | Age: 62
End: 2017-08-04
Attending: FAMILY MEDICINE
Payer: COMMERCIAL

## 2017-08-04 VITALS
OXYGEN SATURATION: 98 % | BODY MASS INDEX: 30.51 KG/M2 | SYSTOLIC BLOOD PRESSURE: 110 MMHG | HEART RATE: 55 BPM | TEMPERATURE: 98.4 F | WEIGHT: 206 LBS | HEIGHT: 69 IN | DIASTOLIC BLOOD PRESSURE: 78 MMHG

## 2017-08-04 DIAGNOSIS — S82.002D CLOSED NONDISPLACED FRACTURE OF LEFT PATELLA WITH ROUTINE HEALING, UNSPECIFIED FRACTURE MORPHOLOGY, SUBSEQUENT ENCOUNTER: Primary | ICD-10-CM

## 2017-08-04 DIAGNOSIS — S82.002D CLOSED NONDISPLACED FRACTURE OF LEFT PATELLA WITH ROUTINE HEALING, UNSPECIFIED FRACTURE MORPHOLOGY, SUBSEQUENT ENCOUNTER: ICD-10-CM

## 2017-08-04 PROCEDURE — 99213 OFFICE O/P EST LOW 20 MIN: CPT | Performed by: FAMILY MEDICINE

## 2017-08-04 PROCEDURE — 73560 X-RAY EXAM OF KNEE 1 OR 2: CPT | Mod: LT

## 2017-08-04 RX ORDER — OXYCODONE AND ACETAMINOPHEN 5; 325 MG/1; MG/1
1 TABLET ORAL EVERY 4 HOURS PRN
Qty: 60 TABLET | Refills: 0 | Status: SHIPPED | OUTPATIENT
Start: 2017-08-04 | End: 2017-08-16

## 2017-08-04 NOTE — NURSING NOTE
"Chief Complaint   Patient presents with     Follow Up For     Left Knee        Initial /78  Pulse 55  Temp 98.4  F (36.9  C) (Oral)  Ht 5' 9\" (1.753 m)  Wt 206 lb (93.4 kg)  SpO2 98%  BMI 30.42 kg/m2 Estimated body mass index is 30.42 kg/(m^2) as calculated from the following:    Height as of this encounter: 5' 9\" (1.753 m).    Weight as of this encounter: 206 lb (93.4 kg).  Medication Reconciliation: complete   Portia Vilchis Certified Medical Assistant      "

## 2017-08-04 NOTE — MR AVS SNAPSHOT
"              After Visit Summary   2017    Nick Villalobos    MRN: [id number]           Patient Information     Date Of Birth          1955        Visit Information        Provider Department      2017 3:00 PM Weiler, Karen, MD [facility] [place]        Today's Diagnoses     Closed nondisplaced fracture of left patella with routine healing, unspecified fracture morphology, subsequent encounter    -  1       Follow-ups after your visit        Who to contact     If you have questions or need follow up information about today's clinic visit or your schedule please contact [facility] [place] directly at [phone].  Normal or non-critical lab and imaging results will be communicated to you by MyChart, letter or phone within 4 business days after the clinic has received the results. If you do not hear from us within 7 days, please contact the clinic through [employer] [email or web address] or phone. If you have a critical or abnormal lab result, we will notify you by phone as soon as possible.  Submit refill requests through [employer] or call your pharmacy and they will forward the refill request to us. Please allow 3 business days for your refill to be completed.          Additional Information About Your Visit        MyChart Information     [employer] lets you send messages to your doctor, view your test results, renew your prescriptions, schedule appointments and more. To sign up, go to [email or web address] . Click on \"Log in\" on the left side of the screen, which will take you to the Welcome page. Then click on \"Sign up Now\" on the right side of the page.     You will be asked to enter the access code listed below, as well as some personal information. Please follow the directions to create your username and password.     Your access code is: [email or web address]  Expires: 10/12/2017  3:47 PM     Your access code will  in 90 days. If you need help or a new code, please call your [facility] or [phone].   " "     Care EveryWhere ID     This is your Care EveryWhere ID. This could be used by other organizations to access your [facility] medical records  [id number]        Your Vitals Were     Pulse Temperature Height Pulse Oximetry BMI (Body Mass Index)       55 98.4  F (36.9  C) (Oral) 5' 9\" (1.753 m) 98% 30.42 kg/m2        Blood Pressure from Last 3 Encounters:   08/04/17 110/78   07/14/17 120/66   02/09/17 128/80    Weight from Last 3 Encounters:   08/04/17 206 lb (93.4 kg)   07/14/17 205 lb (93 kg)   02/09/17 224 lb (101.6 kg)                 Today's Medication Changes          These changes are accurate as of: 8/4/17 11:59 PM.  If you have any questions, ask your nurse or doctor.               These medicines have changed or have updated prescriptions.        Dose/Directions    oxyCODONE-acetaminophen 5-325 MG per tablet   Commonly known as:  PERCOCET   This may have changed:  additional instructions   Changed by:  Weiler, Karen, MD        Dose:  1 tablet   Take 1 tablet by mouth every 4 hours as needed for pain maximum 4 tablet(s) per day   Quantity:  60 tablet   Refills:  0            Where to get your medicines      Some of these will need a paper prescription and others can be bought over the counter.  Ask your nurse if you have questions.     Bring a paper prescription for each of these medications     oxyCODONE-acetaminophen 5-325 MG per tablet                Primary Care Provider Office Phone # Fax #    Karen Weiler, [phone] [address]  [place] [address]        Equal Access to Services     [facility]: Hadii aleta rayoo Somarc, waaxda luqadaha, qaybta kaalmada moo, waxmelodie idiaysha cheema. So [facility] [phone].    ATENCIÓN: Si habla español, tiene a padilla disposición servicios gratuitos de asistencia lingüística. Llame al [phone].    We comply with applicable federal civil rights laws and [place] laws. We do not discriminate on the basis of race, " color, national origin, age, disability sex, sexual orientation or gender identity.            Thank you!     Thank you for choosing [facility] [place]  for your care. Our goal is always to provide you with excellent care. Hearing back from our patients is one way we can continue to improve our services. Please take a few minutes to complete the written survey that you may receive in the mail after your visit with us. Thank you!             Your Updated Medication List - Protect others around you: Learn how to safely use, store and throw away your medicines at www.disposemymeds.org.          This list is accurate as of: 8/4/17 11:59 PM.  Always use your most recent med list.                   Brand Name Dispense Instructions for use Diagnosis    diazepam 5 MG tablet    VALIUM    30 tablet    Take 1 tablet (5 mg) by mouth every 8 hours as needed for anxiety or sleep (muscle spasm)    Left knee pain, unspecified chronicity       oxyCODONE-acetaminophen 5-325 MG per tablet    PERCOCET    60 tablet    Take 1 tablet by mouth every 4 hours as needed for pain maximum 4 tablet(s) per day

## 2017-08-04 NOTE — PROGRESS NOTES
"  SUBJECTIVE:                                                    Nick Villalobos is a 61 year old male who presents to clinic today for the following health issues:      Last visit (7/14/17) pt was diagnosed with a hairline fracture in his left patella, is here for a follow up.    He notes he is still having pain going up/down stairs, takes steps one at a time. Last week on Tuesday he experienced sharp pain in his left knee when a co-worker bumped into it at work. He relates the pain has improved since last visit, is taking his meds as prescribed for pain, is helping. He wears knee brace off and on, has a bit of a limp. Pt notes touching certain sides of his knee exacerbates his pain, thinks it is getting better but notes he may have torn his meniscus. He takes Percocet 5X per day for pain relief, has not used Valium with his Percocet. He wants to wait before doing more testing, wants one more round of pain medications then see how he does with that.      Problem list and histories reviewed & adjusted, as indicated.  Additional history: as documented    Reviewed and updated as needed this visit by clinical staff  Tobacco  Allergies  Meds  Med Hx  Surg Hx  Fam Hx  Soc Hx      Reviewed and updated as needed this visit by Provider       ROS:  Constitutional, HEENT, cardiovascular, pulmonary, gi and gu systems are negative, except as otherwise noted.    This document serves as a record of the services and decisions personally performed and made by Karen Weiler, MD. It was created on her behalf by Elliott Joiner, a trained medical scribe. The creation of this document is based on the provider's statements to the medical scribe.  Elliott Joiner 3:59 PM August 4, 2017    OBJECTIVE:   /78  Pulse 55  Temp 98.4  F (36.9  C) (Oral)  Ht 1.753 m (5' 9\")  Wt 93.4 kg (206 lb)  SpO2 98%  BMI 30.42 kg/m2  Body mass index is 30.42 kg/(m^2).  GENERAL: healthy, alert and no distress  NECK: no adenopathy, no " asymmetry, masses, or scars and thyroid normal to palpation  RESP: lungs clear to auscultation - no rales, rhonchi or wheezes  CV: regular rate and rhythm, normal S1 S2, no S3 or S4, no murmur, click or rub, no peripheral edema and peripheral pulses strong  ABDOMEN: soft, nontender, no hepatosplenomegaly, no masses and bowel sounds normal  MS: Left knee: +tenderness over the patella.  Normal flexion and extension of the knee. Negative anterior and posterior drawer signs.   SKIN: no suspicious lesions or rashes  NEURO: Normal strength and tone, mentation intact and speech normal  PSYCH: mentation appears normal, affect normal/bright    Diagnostic Test Results:  No results found for this or any previous visit (from the past 24 hour(s)).  Xray of left knee- Pending radiology result    ASSESSMENT/PLAN:     (S82.002D) Closed nondisplaced fracture of left patella with routine healing, unspecified fracture morphology, subsequent encounter  (primary encounter diagnosis)  Comment: Will wait to see what Radiology says about his XR today before pursuing additional testing and treatment options. May consider Ortho referral. Pt wants to do another round of Percocet before pursuing further testing and treatment options, he has been taking Percocet 5X a day, will decrease dose to 4X a day to wean him off pain meds,   Plan: XR Knee Left 1/2 Views        Follow up if needed.    The information in this document, created by the medical scribe for me, accurately reflects the services I personally performed and the decisions made by me. I have reviewed and approved this document for accuracy prior to leaving the patient care area.  August 4, 2017 3:59 PM    Karen Weiler, MD  [facility]

## 2017-08-10 DIAGNOSIS — M25.562 LEFT KNEE PAIN: Primary | ICD-10-CM

## 2017-08-10 DIAGNOSIS — S82.002D CLOSED FRACTURE OF LEFT PATELLA WITH ROUTINE HEALING: ICD-10-CM

## 2017-08-16 ENCOUNTER — TELEPHONE (OUTPATIENT)
Dept: FAMILY MEDICINE | Facility: CLINIC | Age: 62
End: 2017-08-16

## 2017-08-16 DIAGNOSIS — S82.045D CLOSED NONDISPLACED COMMINUTED FRACTURE OF LEFT PATELLA WITH ROUTINE HEALING, SUBSEQUENT ENCOUNTER: Primary | ICD-10-CM

## 2017-08-16 RX ORDER — OXYCODONE AND ACETAMINOPHEN 5; 325 MG/1; MG/1
1 TABLET ORAL EVERY 4 HOURS PRN
Qty: 60 TABLET | Refills: 0 | Status: SHIPPED | OUTPATIENT
Start: 2017-08-16 | End: 2017-08-29

## 2017-08-16 NOTE — TELEPHONE ENCOUNTER
Prescriptions done.  Please notify patient ready for . Placed in TC's  box.  Please also let  Patient know I want him to follow up with orthopedics. Referral was done. Have they called him to schedule?  Letter was also done. Placed in TC's box.     Karen Weiler, MD

## 2017-08-16 NOTE — LETTER
August 16, 2017        Nick Villalobos  [address] [facility] [address]        To Whom It May Concern,      The above patient is currently under my care.  He his unable to use his gym membership for at least the next 2 months due to a fractured patella.  Please contact me with questions or concerns.      Sincerely,             Karen Weiler, MD

## 2017-08-16 NOTE — TELEPHONE ENCOUNTER
Please call Conner at [phone] when rx is ready to  at . (Ok to leave detailed message).    Refill request received via: Phone call from patient and would like a call back from Dr. Weiler as well to discuss what's going on with him. He stated his daughters is having a baby any day now and he has a  to go to and he is not sure if and when he can come in for an appt.     Patient requesting refill for: oxyCODONE-acetaminophen (PERCOCET) 5-325 MG per tablet  Last Office Visit: 17  Last Refill (see below):    oxyCODONE-acetaminophen (PERCOCET) 5-325 MG per tablet 60 tablet 0 2017  No   Sig: Take 1 tablet by mouth every 4 hours as needed for pain maximum 4 tablet(s) per day   Class: Local Print   Route: Oral   Order: [id number]     Monica Casey Workforce [facility]-Patient Representative

## 2017-08-16 NOTE — LETTER
August 16, 2017        Nick Villalobos  [address] [facility] [address]        To Whom It May Concern,      The above patient is currently under my care.  He his unable to use his gym membership for at least from 7/14/2017 till 10/1/2017  due to a fractured patella.  Please contact me with questions or concerns.      Sincerely,             Karen Weiler, MD

## 2017-08-16 NOTE — TELEPHONE ENCOUNTER
Controlled Substance Refill Request for Percocet  Problem List Complete:  No     PROVIDER TO CONSIDER COMPLETION OF PROBLEM LIST AND OVERVIEW/CONTROLLED SUBSTANCE AGREEMENT  Last Office Visit with [facility] primary care provider: 8/4/17    Future Office visit:     Controlled substance agreement on file: No.     Processing:  Patient will  in clinic     checked in past 6 months?  Yes 8/16/17  No concerns.     * Dr. Weiler please see below message patient would like a return call.   Analia Rosenberg R.N.

## 2017-08-29 DIAGNOSIS — S82.045D CLOSED NONDISPLACED COMMINUTED FRACTURE OF LEFT PATELLA WITH ROUTINE HEALING, SUBSEQUENT ENCOUNTER: ICD-10-CM

## 2017-08-29 NOTE — TELEPHONE ENCOUNTER
Please call Nick at [phone] when rx is ready to  at . (Ok to leave detailed message).    Refill request received via: Patient came into clinic to say he doesn't think he needs surgery, and just wants a refill for right now because he is using his other leg more to compensate for the bad knee and its causing a lot of pain. He stated he had a friend of his who is a Doctor look at his xray and he said he doesn't need to have surgery and to just let it heal.   Patient requesting refill for: oxyCODONE-acetaminophen (PERCOCET) 5-325 MG per tablet  Last Office Visit: 08/04/2017  Last Refill (see below):    oxyCODONE-acetaminophen (PERCOCET) 5-325 MG per tablet 60 tablet 0 8/16/2017  No   Sig: Take 1 tablet by mouth every 4 hours as needed for pain maximum 4 tablet(s) per day   Class: Local Print   Route: Oral   Order: [id number]     Monica Ni [facility]-Patient Representative

## 2017-08-29 NOTE — TELEPHONE ENCOUNTER
Controlled Substance Refill Request for Percocet  Problem List Complete:  No     PROVIDER TO CONSIDER COMPLETION OF PROBLEM LIST AND OVERVIEW/CONTROLLED SUBSTANCE AGREEMENT    Last Written Prescription Date:  8/16/17  Last Fill Quantity: 60,   # refills: 0    Last Office Visit with [facility] primary care provider: 8/4/17    Future Office visit:     Controlled substance agreement on file: No.     Processing:  Patient will  in clinic    RX [place] reviewed:  reviewed- no concerns    [place]:  [email or web address]  Monica Leahy RN, BSN  [facility]

## 2017-08-29 NOTE — TELEPHONE ENCOUNTER
oxyCODONE-acetaminophen (PERCOCET) 5-325 MG per tablet      Last Written Prescription Date:  8/16/2017  Last Fill Quantity: 60 tablet,   # refills: 0  Last Office Visit with [facility], [facility] or [facility] prescribing provider: 8/4/2017  Future Office visit:       Routing refill request to provider for review/approval because:  Drug not on the [facility], [facility] or [facility] refill protocol or controlled substance

## 2017-08-31 RX ORDER — OXYCODONE AND ACETAMINOPHEN 5; 325 MG/1; MG/1
1 TABLET ORAL EVERY 4 HOURS PRN
Qty: 60 TABLET | Refills: 0 | Status: SHIPPED | OUTPATIENT
Start: 2017-08-31 | End: 2017-09-18

## 2017-08-31 NOTE — TELEPHONE ENCOUNTER
Patient came into clinic to check on the status of his RX.  PH: [phone]  Ok to leave     Monica Alarcon  [facility]-Patient Representative

## 2017-08-31 NOTE — TELEPHONE ENCOUNTER
Prescriptions done.  Please notify patient ready for . Placed in TC's  box.  Please make sure that the patient is going to follow up with Ortho.     Karen Weiler, MD

## 2017-09-18 DIAGNOSIS — S82.045D CLOSED NONDISPLACED COMMINUTED FRACTURE OF LEFT PATELLA WITH ROUTINE HEALING, SUBSEQUENT ENCOUNTER: ICD-10-CM

## 2017-09-18 NOTE — TELEPHONE ENCOUNTER
Please call Conner at [phone] when rx is ready to  at . (Ok to leave detailed message).    Refill request received via: Patient came into clinic to ask for another refill because he figured he was done but last night he was feeling a lto of pain and would like to gradually come off it using it but stopping abruptly really hurt. He would like to possibly discuss weaning off it.  Patient requesting refill for: oxyCODONE-acetaminophen (PERCOCET) 5-325 MG per tablet  Last Office Visit: 08/04/2017  Last Refill (see below):    oxyCODONE-acetaminophen (PERCOCET) 5-325 MG per tablet 60 tablet 0 8/31/2017  No   Sig: Take 1 tablet by mouth every 4 hours as needed for pain maximum 4 tablet(s) per day   Class: Local Print   Route: Oral   Order: [id number]     Monica [employer] [facility]-Patient Representative

## 2017-09-18 NOTE — TELEPHONE ENCOUNTER
oxyCODONE-acetaminophen (PERCOCET) 5-325 MG per tablet      Last Written Prescription Date:  8/31/2017  Last Fill Quantity: 60 tablet,   # refills: 0  Last Office Visit with [facility], [facility] or [facility] prescribing provider: 8/4/2017  Future Office visit:       Routing refill request to provider for review/approval because:  Drug not on the [facility], [facility] or [facility] refill protocol or controlled substance

## 2017-09-18 NOTE — TELEPHONE ENCOUNTER
Patient calling to check on refill status - advised Weiler is not in office.    Frank Carmona  Patient Representative-[facility]

## 2017-09-19 RX ORDER — OXYCODONE AND ACETAMINOPHEN 5; 325 MG/1; MG/1
1 TABLET ORAL EVERY 4 HOURS PRN
Qty: 30 TABLET | Refills: 0 | Status: SHIPPED | OUTPATIENT
Start: 2017-09-19 | End: 2018-05-15

## 2017-09-19 NOTE — TELEPHONE ENCOUNTER
Prescriptions done.  Please notify patient ready for . Placed in TC's  box.  Patient needs to be seen in follow up if he is still having a lot of pain.    Karen Weiler, MD

## 2017-09-19 NOTE — TELEPHONE ENCOUNTER
Called pt and let him know rx is ready at the .  Reminded himn in future we need 48-72 buisness days to process all refill requests.  Ange Ellsworth

## 2017-10-20 ENCOUNTER — HEALTH MAINTENANCE LETTER (OUTPATIENT)
Age: 62
End: 2017-10-20

## 2017-10-23 ENCOUNTER — TELEPHONE (OUTPATIENT)
Dept: FAMILY MEDICINE | Facility: CLINIC | Age: 62
End: 2017-10-23

## 2017-10-23 DIAGNOSIS — M25.562 LEFT KNEE PAIN, UNSPECIFIED CHRONICITY: ICD-10-CM

## 2017-10-23 NOTE — TELEPHONE ENCOUNTER
Please call Nick at [phone] when rx is faxed to [facility].  (Ok to leave detailed message).    Refill request received via: phone, Nick said he originally requested refill on 10/17 and was just calling to check on status, however we don't have anything in his record.  Patient requesting refill for: Valium    Last Office Visit: 08/04/2017  Last Refill (see below):    diazepam (VALIUM) 5 MG tablet 30 tablet 0 7/21/2017  No   Sig: Take 1 tablet (5 mg) by mouth every 8 hours as needed for anxiety or sleep (muscle spasm)   Class: Local Print   Route: Oral   Order: [id number]     Ella Askew  Patient Representative

## 2017-10-23 NOTE — TELEPHONE ENCOUNTER
Reason for Call:  Conner is calling to check on status of letter request from 10/17/17. Unfortunately we have no record of this request. He would like another letter like before stating due to his injury he was unable to use his gym membership for the month of October. Please call pt when letter is ready to be picked up.    Best phone number to reach pt at is: [phone]  Ok to leave a message with medical info? yes    Ella Askew  Patient Representative

## 2017-10-23 NOTE — LETTER
[facility]  [address] [place] [address]                                                                                                     [phone]          RE:  Nick Villalobos  [facility] [facility] [address]          October 24, 2017      To whom it may concern    The above patient is currently under my care.  He is unable to use his gym membership for the month of October - 10/1/17 to 10/31/17 due to a fractured patella.  Please contact me with questions or concerns.       Sincerely,             Karen Weiler, MD

## 2017-10-24 NOTE — TELEPHONE ENCOUNTER
diazepam (VALIUM) 5 MG tablet      Last Written Prescription Date:  7/21/2017  Last Fill Quantity: 30 tablet,   # refills: 0  Last Office visit:   8/4/2017    Routing refill request to provider for review/approval because:  Drug not on the [facility], [facility] or [facility] refill protocol or controlled substance

## 2017-10-25 RX ORDER — DIAZEPAM 5 MG
5 TABLET ORAL EVERY 8 HOURS PRN
Qty: 30 TABLET | Refills: 0 | Status: SHIPPED | OUTPATIENT
Start: 2017-10-25 | End: 2018-05-15

## 2018-02-16 ENCOUNTER — OFFICE VISIT (OUTPATIENT)
Dept: FAMILY MEDICINE | Facility: CLINIC | Age: 63
End: 2018-02-16
Payer: COMMERCIAL

## 2018-02-16 VITALS
SYSTOLIC BLOOD PRESSURE: 110 MMHG | TEMPERATURE: 98.1 F | WEIGHT: 219 LBS | OXYGEN SATURATION: 97 % | HEIGHT: 69 IN | DIASTOLIC BLOOD PRESSURE: 70 MMHG | BODY MASS INDEX: 32.44 KG/M2 | HEART RATE: 57 BPM

## 2018-02-16 DIAGNOSIS — J06.9 VIRAL URI WITH COUGH: Primary | ICD-10-CM

## 2018-02-16 PROCEDURE — 99214 OFFICE O/P EST MOD 30 MIN: CPT | Performed by: FAMILY MEDICINE

## 2018-02-16 NOTE — LETTER
February 16, 2018      Nick Villalobos  [address] [facility] [address] [facility] [place] [address]        To Whom It May Concern:    Nick Villalobos was seen in our clinic on February 16, 2018 for an acute viral infection. If feeling too unwell, he will not be traveling. If you have any questions or concerns, please contact me.    Sincerely,        Deepak Abel, DO

## 2018-02-16 NOTE — PATIENT INSTRUCTIONS
For congestion and sinus pressure relief, try Sudafed. Can also try Flonase to help with nasal symptoms.

## 2018-02-16 NOTE — MR AVS SNAPSHOT
"              After Visit Summary   2018    Nick Villalobos    MRN: [id number]           Patient Information     Date Of Birth          1955        Visit Information        Provider Department      2018 4:40 PM Deepak Abel,  [facility]        Care Instructions    For congestion and sinus pressure relief, try Sudafed. Can also try Flonase to help with nasal symptoms.          Follow-ups after your visit        Follow-up notes from your care team     Return if symptoms worsen or fail to improve.      Who to contact     If you have questions or need follow up information about today's clinic visit or your schedule please contact [facility] directly at [phone].  Normal or non-critical lab and imaging results will be communicated to you by MyChart, letter or phone within 4 business days after the clinic has received the results. If you do not hear from us within 7 days, please contact the clinic through MyChart or phone. If you have a critical or abnormal lab result, we will notify you by phone as soon as possible.  Submit refill requests through [employer] or call your pharmacy and they will forward the refill request to us. Please allow 3 business days for your refill to be completed.          Additional Information About Your Visit        MyChart Information     [employer] lets you send messages to your doctor, view your test results, renew your prescriptions, schedule appointments and more. To sign up, go to [email or web address] . Click on \"Log in\" on the left side of the screen, which will take you to the Welcome page. Then click on \"Sign up Now\" on the right side of the page.     You will be asked to enter the access code listed below, as well as some personal information. Please follow the directions to create your username and password.     Your access code is: [email or web address]  Expires: 2018  5:05 PM     Your access code will  in 90 days. If you need " "help or a new code, please call your [facility] clinic or [phone].        Care EveryWhere ID     This is your Care EveryWhere ID. This could be used by other organizations to access your [facility] medical records  [id number]        Your Vitals Were     Pulse Temperature Height Pulse Oximetry BMI (Body Mass Index)       57 98.1  F (36.7  C) (Oral) 5' 9\" (1.753 m) 97% 32.34 kg/m2        Blood Pressure from Last 3 Encounters:   02/16/18 110/70   08/04/17 110/78   07/14/17 120/66    Weight from Last 3 Encounters:   02/16/18 219 lb (99.3 kg)   08/04/17 206 lb (93.4 kg)   07/14/17 205 lb (93 kg)              Today, you had the following     No orders found for display       Primary Care Provider Office Phone # Fax #    [facility] [phone] [address]  [facility] [address]        Equal Access to Services     JADEN MARTINEZ : Hadii aleta ku hadasho Soomaali, waaxda luqadaha, qaybta kaalmada adeegyada, waxay juan rendon . So [facility] [phone].    ATENCIÓN: Si habla español, tiene a padilla disposición servicios gratuitos de asistencia lingüística. Llame al [phone].    We comply with applicable federal civil rights laws and [place] laws. We do not discriminate on the basis of race, color, national origin, age, disability, sex, sexual orientation, or gender identity.            Thank you!     Thank you for choosing [facility]  for your care. Our goal is always to provide you with excellent care. Hearing back from our patients is one way we can continue to improve our services. Please take a few minutes to complete the written survey that you may receive in the mail after your visit with us. Thank you!             Your Updated Medication List - Protect others around you: Learn how to safely use, store and throw away your medicines at www.disposemymeds.org.          This list is accurate as of 2/16/18  5:05 PM.  Always use your most recent med list.       "             Brand Name Dispense Instructions for use Diagnosis    diazepam 5 MG tablet    VALIUM    30 tablet    Take 1 tablet (5 mg) by mouth every 8 hours as needed for anxiety or sleep (muscle spasm)    Left knee pain, unspecified chronicity       oxyCODONE-acetaminophen 5-325 MG per tablet    PERCOCET    30 tablet    Take 1 tablet by mouth every 4 hours as needed for pain maximum 4 tablet(s) per day    Closed nondisplaced comminuted fracture of left patella with routine healing, subsequent encounter

## 2018-02-16 NOTE — PROGRESS NOTES
SUBJECTIVE:   Nick Villalobos is a 62 year old male who presents to clinic today for the following health issues:      Acute Illness   Acute illness concerns: URI  Onset: X3 Days     Fever: no    Chills/Sweats: YES    Headache (location?): YES    Sinus Pressure:YES    Conjunctivitis:  YES- watery right eye     Ear Pain: no    Rhinorrhea: YES    Congestion: YES    Sore Throat: YES     Cough: YES    Wheeze: no    Decreased Appetite: YES    Nausea: no    Vomiting: no    Diarrhea:  no    Dysuria/Freq.: no    Fatigue/Achiness: YES- sore muscle     Sick/Strep Exposure: YES- at work      Therapies Tried and outcome: waylon seltzer plus , clindamycin 300 mg (5 left over from last November), Mucinex     He states he is going to [place] tomorrow morning and if he feels like he can't travel, will need a Doctor's note for the airline.    Problem list and histories reviewed & adjusted, as indicated.  Additional history: as documented    Patient Active Problem List   Diagnosis     Family history of malignant neoplasm of prostate     Esophageal reflux     Advanced directives, counseling/discussion     Colon polyps     Hyperlipidemia LDL goal <160     Sinus bradycardia     Past Surgical History:   Procedure Laterality Date     HC COLONOSCOPY THRU STOMA, DIAGNOSTIC  6/15/07    Hyperplastic Polyp.  Needs repeat in 5 years     SURGICAL HISTORY OF -       tendon repair in right hand       Social History   Substance Use Topics     Smoking status: Former Smoker     Years: 30.00     Quit date: 2001     Smokeless tobacco: Never Used     Alcohol use Yes      Comment: 2x per month     Family History   Problem Relation Age of Onset     Prostate Cancer Father      Cancer - colorectal Father      C.A.D. Father       at 67, presumed MI     Hypertension Father      CEREBROVASCULAR DISEASE Mother      at 64           Reviewed and updated as needed this visit by clinical staff  Tobacco  Allergies  Meds  Problems  Med Hx  Surg Hx  " Fam Hx  Soc Hx        Reviewed and updated as needed this visit by Provider  Allergies  Meds  Problems         ROS:  Constitutional, HEENT, cardiovascular, pulmonary, gi and gu systems are negative, except as otherwise noted.    OBJECTIVE:     /70  Pulse 57  Temp 98.1  F (36.7  C) (Oral)  Ht 5' 9\" (1.753 m)  Wt 219 lb (99.3 kg)  SpO2 97%  BMI 32.34 kg/m2  Body mass index is 32.34 kg/(m^2).  GENERAL: healthy, alert and no distress  EYES: Eyes grossly normal to inspection, PERRL and conjunctivae and sclerae normal  HENT: ear canals and TM's normal, nose and mouth without ulcers or lesions  NECK: no adenopathy and no asymmetry, masses, or scars  RESP: lungs clear to auscultation - no rales, rhonchi or wheezes  CV: regular rate and rhythm, normal S1 S2, no S3 or S4, no murmur, click or rub, no peripheral edema and peripheral pulses strong  ABDOMEN: soft, nontender, no hepatosplenomegaly, no masses and bowel sounds normal  MS: no gross musculoskeletal defects noted, no edema    Diagnostic Test Results:  none     ASSESSMENT/PLAN:   1. Viral URI with cough: hemodynamically stable, afebrile and non-toxic appearing. The signs and symptoms are consistent with viral upper respiratory illness. The patient is well appearing and in no significant distress. The patient will be treated symptomatically on an outpatient basis. Encourage oral hydration, symptomatic relief with PRN Tylenol/ibuprofen. Continue other OTC supportive cares as helpful. Letter given to patient stating he was seen in clinic and if feeling too unwell will not be traveling.      Deepak Abel, DO  [facility]  "

## 2018-02-16 NOTE — NURSING NOTE
"Chief Complaint   Patient presents with     URI       Initial /70  Pulse 57  Temp 98.1  F (36.7  C) (Oral)  Ht 5' 9\" (1.753 m)  Wt 219 lb (99.3 kg)  SpO2 97%  BMI 32.34 kg/m2 Estimated body mass index is 32.34 kg/(m^2) as calculated from the following:    Height as of this encounter: 5' 9\" (1.753 m).    Weight as of this encounter: 219 lb (99.3 kg).  Medication Reconciliation: complete   Portia Vilchis Certified Medical Assistant    "

## 2018-04-26 ENCOUNTER — OFFICE VISIT (OUTPATIENT)
Dept: FAMILY MEDICINE | Facility: CLINIC | Age: 63
End: 2018-04-26
Payer: COMMERCIAL

## 2018-04-26 VITALS — HEART RATE: 52 BPM | SYSTOLIC BLOOD PRESSURE: 140 MMHG | OXYGEN SATURATION: 98 % | DIASTOLIC BLOOD PRESSURE: 90 MMHG

## 2018-04-26 DIAGNOSIS — L82.1 SEBORRHEIC KERATOSES: ICD-10-CM

## 2018-04-26 DIAGNOSIS — D22.9 MULTIPLE BENIGN MELANOCYTIC NEVI: ICD-10-CM

## 2018-04-26 DIAGNOSIS — L81.4 SOLAR LENTIGO: ICD-10-CM

## 2018-04-26 DIAGNOSIS — L82.0 INFLAMED SEBORRHEIC KERATOSIS: ICD-10-CM

## 2018-04-26 DIAGNOSIS — L84 CORN OR CALLUS: ICD-10-CM

## 2018-04-26 DIAGNOSIS — Z80.8 FAMILY HISTORY OF NONMELANOMA SKIN CANCER: ICD-10-CM

## 2018-04-26 DIAGNOSIS — D48.5 NEOPLASM OF UNCERTAIN BEHAVIOR OF SKIN: Primary | ICD-10-CM

## 2018-04-26 PROCEDURE — 88305 TISSUE EXAM BY PATHOLOGIST: CPT | Mod: TC | Performed by: FAMILY MEDICINE

## 2018-04-26 PROCEDURE — 17110 DESTRUCTION B9 LES UP TO 14: CPT | Mod: 51 | Performed by: FAMILY MEDICINE

## 2018-04-26 PROCEDURE — 11310 SHAVE SKIN LESION 0.5 CM/<: CPT | Mod: 59 | Performed by: FAMILY MEDICINE

## 2018-04-26 PROCEDURE — 99213 OFFICE O/P EST LOW 20 MIN: CPT | Mod: 25 | Performed by: FAMILY MEDICINE

## 2018-04-26 NOTE — PATIENT INSTRUCTIONS
"FUTURE APPOINTMENTS  Follow up per pathology report.  Follow up annually for a full-body skin cancer screening.  Consider following up with Dr. Laura Caballero for consultation of laser treatment.    WOUND CARE INSTRUCTIONS  1. Wash hands before every dressing change.  2. After 24 hours, change dressing daily.  3. Wash the wound area with a mild soap, then rinse.  4. Gently pat dry with a sterile gauze or Q-tip.  5. Using a Q-tip, apply Vaseline or Aquaphor only over entire wound. Do NOT use Neosporin - as many people react to neomycin.  6. Finally, cover with a bandage or sterile non-stick gauze with micropore paper tape.  7. Repeat once daily until wound has healed.      Soap, water and shampoo will not hurt this area.    Do not go swimming or take baths, but showering is encouraged.    Limit use of the area where the procedure was done for a few days to allow for optimal healing.    If you experience bleeding:  Wash hands and hold firm pressure on the area for 10 minutes without checking to see if the bleeding has stopped. \"Checking\" pulls off the protective wound clot and restarts the bleeding all over again. Re-apply pressure for 10 minutes if necessary to stop bleeding.  Use additional sterile gauze and tape to maintain pressure once bleeding has stopped.  If bleeding continues, then call back to clinic at [phone].    Signs of Infection:  Infection can occur in any area where skin has been disrupted.  If you notice persistent redness, swelling, colored drainage, increasing pain, fever or other signs of infection, please call us at: [phone] and ask to have me or my colleague paged. We will call you back to discuss.    Pathology Results:  You will be notified, generally via letter or MyChart, in approximately 10 days. If there is anything we need to discuss or further treatment needed, I will call you to discuss it.    PATIENT INFORMATION : WOUNDS  During the healing process you will notice a number of " changes. All wounds develop a small halo of redness surrounding the wound.  This means healing is occurring. Severe itching with extensive redness usually indicates sensitivity to the ointment or bandage tape used to dress the wound.  You should call our office if this develops.      Swelling  and/or discoloration around your surgical site is common, particularly when performed around the eye.    All wounds normally drain.  The larger the wound the more drainage there will be.  After 7-10 days, you will notice the wound beginning to shrink and new skin will begin to grow.  The wound is healed when you can see skin has formed over the entire area.  A healed wound has a healthy, shiny look to the surface and is red to dark pink in color to normalize.  Wounds may take approximately 4-6 weeks to heal.  Larger wounds may take 6-8 weeks. After the wound is healed you may discontinue dressing changes.    You may experience a sensation of tightness as your wound heals. This is normal and will gradually subside.    Your healed wound may be sensitive to temperature changes. This sensitivity improves with time, but if you re having a lot of discomfort, try to avoid temperature extremes.    Patients frequently experience itching after their wound appears to have healed because of the continue healing under the skin.  Plain Vaseline will help relieve the itching.    CRYOTHERAPY POST-TREATMENT CARE INSTRUCTIONS  Liquid nitrogen is mildly uncomfortable when applied to the skin, but the discomfort rapidly subsides.    Post-Treatment:  You may experience burning and/or stinging immediately following the procedure. The discomfort from the procedure may persist over the next 12-24 hours. The area treated will look pinker and slightly swollen before the healing process begins. You may also notice redness, swelling, tenderness, weeping and crusts or scabs. Healing time is approximately 10-14 days.    Blister - You may or may notice  blistering from the freezing. If you develop an uncomfortable blister from today's treatment, you may gently puncture this with a needle that has been cleaned with alcohol. However, do not remove the protective skin layer of the blister.    Scab - After a few days, you may notice scaliness or scab formation. Do not pick at the scabs because this may cause slower healing and a permanent scar.    The skin may appear temporarily darker at the treatment site, but this usually fades over a period of months, provided that the area is protected from the sun.    Care of the areas treated:    Wash the area with a mild cleanser.    Gently pat dry.    Do not rub.     Keep protected from the sun during the healing process and for a full year following treatment as the skin continues to remodel during this time.    If you experience dryness or persistent burning, you may use Vaseline or Aquaphor ointment sparingly.    Do not use Neosporin, as many people eventually develop a medication allergy, that can easily be confused with an infection, to Neomycin.    Return if:  If there is any concern that the lesion has persisted, make an appointment for a re-check. Healing time does vary depending on your individual healing process and the area of the body treated. Most patients will be healed in one month.    Signs of Infection:  Thankfully this is rare. However if you notice persistent colored drainage, increasing pain, fever or other signs of infection, please call back at [phone].    SUN PROTECTION INSTRUCTIONS  Sun damage can lead to skin cancer and premature aging of the skin.      The best way to protect from sun damage to your skin is to avoid the sun during peak hours (10 am - 2 pm) even on overcast days.      Use UPF sun-protective clothing, which while more expensive initially provides longer lasting coverage without having to worry about remembering to re-apply.  1. Wear a wide-brimmed hat and sunglasses.   2. Wear  "sun-protective clothing.  [employer] and other [employer] make sun protective clothing that are stylish, comfortable and cool. [employer] and other companies make UV arm sleeves suitable for golfing, gardening and other activities.      Sunscreen instructions:  1. Use sunscreens with Zinc Oxide, Titanium Dioxide or Avobenzone to protect from UVA rays.  2. Use SPF 30-50+ to protect from UVB rays.  3. Re-apply every 2 hours even if water resistant.  4. Apply on your face every day even when cloudy and even in the winter. UVA \"aging rays\" penetrate window glass and are just as strong in the winter as in the summer.    Product Recommendations:    Good examples include: Blue Lizard, EltaMD, Solbar    Good daily moisturizers with SPF: Vanicream, CeraVe.    For sensitive skin, consider : Skin[employer] Essential Defense Mineral Shield Broad Spectrum SPF 35      Never use tanning beds. Tanning beds are associated with much higher risks of skin cancer.    All tanning damages the skin. Aim for ivory skin year round and you will have less trouble with your skin in years to come. There is no merit in getting \"a base tan\" before a warm weather vacation, as any tanning indicates your body's response to sun damage.    Stop smoking. Smokers have higher rates of skin cancer and also have premature skin wrinkling.    FYI  You should use about 3 tablespoons of sunscreen to protect your whole body. Thus a typical eight ounce bottle of sunscreen should last 4 applications. Remember, that the SPF rating is compromised if you don t apply enough. Most people only apply 1/2 - 1/3 of the amount they need. Also don t forget areas such as your ears, feet, upper back and harder to reach places. Keep in mind that these amounts should be increased for larger body sizes.    Sunscreens with titanium dioxide and/or zinc oxide in the active ingredients are physical blockers as opposed to chemical blockers. Chemical-free sunscreens should not " irritate the skin.    Spray-on sunscreens may be used for touch-up application only, not as a base layer. Also, use with caution around small children due to inhalation risk.    Avoid retinyl palmitate products.    Avoid combination products that include both sunscreen and insect repellant, as sunscreen should be applied every 2 hours, but insect repellant should not be applied as frequently.    SPF means sun protection factor, which is just the degree to which the sunscreen can protect against UVB rays. There is no rating system for UVA rays. SPF is calculated as the time skin will burn when sunscreen is applied vs. skin without sunscreen.    Water resistant sunscreens should be re-applied every 1-2 hours.    For more information:  http://www.skincancer.org/prevention/sun-protection/sunscreen/sunscreens-safe-and-effective    CORN INSTRUCTIONS    Wash the affected area (and optionally, soak in warm water for 5 minutes). Dry thoroughly.    A pumice stone, PedEgg or clean washcloth can be used to gently remove the softened layers of the corn.

## 2018-04-26 NOTE — LETTER
"    4/26/2018         RE: Nick Villalobos  [address] [facility] [id number]        Dear Colleague,    Thank you for referring your patient, Nick Villalobos, to the [facility]. Please see a copy of my visit note below.    [facility] - PRIMARY CARE SKIN    CC : Lesion(s)  SUBJECTIVE:                                                    Nick Villalobos is a 62 year old male who presents to clinic today with wife because of a lesion on the nose.    Bothersome lesions noticed by the patient or other skin concerns :  Issue One : Bump on nose for a couple of weeks. He has previously scratched it with off subsequent bleeding.  He has used benzoyl peroxide, rubbing alcohol and neosporin, but the lesion has not resolved.    Issue Two : Wart on foot. He reports a history of wart on right hand treated with cryotherapy; it may not have fully resolved. He has tried to \"dig it out\".    Issue Three : He also requests evaluation of a colored lesion on the hand.    Issue Four : He is concerned about a bump on the back of the neck. He suspects that a merry bit him. He has tried to squeeze it. He has also applied rubbing alcohol.    Issue Five : He is concerned about prominent veins on the face.    Personal history of skin cancer : NO.  Family history of skin cancer : YES -non-melanoma skin cancer in grandmother.    Sun Exposure History  Previous history of significant sun exposure: significant tanning history with baby oil  Blistering sunburns : YES - red burns  Tanning beds : YES - 10 times in lifetime.  Sunscreen Use : YES, SPF : 25-50.  Sun-protective clothing use : No  Wide-brimmed hats : No  Sunglasses : No    Occupation : sells coins, previously  (both indoor & outdoor).    Refer to electronic medical record (EMR) for past medical history and medications.    INTEGUMENTARY/SKIN: POSITIVE for non-healing lesions  ROS : 14 point review of systems was negative except " the symptoms listed above in the HPI.    This document serves as a record of the services and decisions personally performed and made by Natasha Vines MD. It was created on her behalf by Major Gaspar, a trained medical scribe.  The creation of this document is based on the scribe's personal observations and the provider's statements to the medical scribe.  Major Gaspar, April 26, 2018 11:50 AM      OBJECTIVE:                                                    GENERAL: healthy, alert and no distress  SKIN: Hoang Skin Type - II.  Face were examined. The dermatoscope was used to help evaluate pigmented lesions.  Skin Pertinent Findings:  Left distal nose : 5 mm in size pink flesh-colored lesion with rolled borders and central superficial erosion. ? Basal cell carcinoma ? Other      Right hand, palmar surface : hyperkeratotic tissue most consistent with corn    Left dorsal hand over second metacarpal : brown, macule(s) most consistent with benign solar lentigo.    Back : Multiple brown, macule(s) most consistent with benign solar lentigo. Scattered stuck-on appearing papules, raised, brown, coarse-textured, round lesion(s) most consistent with seborrheic keratoses. Scattered brown macules of various sizes and shapes most consistent with (benign) melanocytic nevi.    Posterior neck : no suspicious characteristics.    Right foot, plantar surface, between 1st and 2nd metatarsal : 3 mm in size hyperkeratotic tissue most consistent with corn or callus.  Procedure: calluses trimmed.  No anesthesia, EBL none.  Pt verbal consent.  Not sent to path.    Left medial proximal thigh : 3 mm in size stuck-on appearing papules, raised, brown, coarse-textured, round lesion(s) most consistent with seborrheic keratoses.  Name : Liquid Nitrogen Cry-Ac Cryotherapy.  Indication : Irritated/Inflamed Benign Lesion.  Location(s) : left thigh - x1.  Completed by : Natasha Vines MD  Note : Discussed natural history of lesion and treatment  options. Prior to treatment, we discussed inflammation, tenderness post-procedure, the healing process, and the risks of pain, infection, scarring, blistering, and hypo-/hyperpigmentation after healing. Explained that these lesions may grow back and may need additional treatment or re-treatment. The patient expressed a desire to proceed with cryotherapy.    The lesion(s) were treated with liquid nitrogen Cry-Ac, five second freeze repeated twice with a pause to allow for the area to thaw.    Patient tolerated the procedure well and left in good condition.  Total number of lesions treated : 1.  Treatment number : 1.    Diagnostic Test Results:  none           ASSESSMENT:                                                      Encounter Diagnoses   Name Primary?     Neoplasm of uncertain behavior of skin Yes     Family history of nonmelanoma skin cancer      Corn or callus      Solar lentigo      Seborrheic keratoses      Multiple benign melanocytic nevi      Inflamed seborrheic keratosis          PLAN:                                                    Patient Instructions   FUTURE APPOINTMENTS  Follow up per pathology report.  Follow up annually for a full-body skin cancer screening.    WOUND CARE INSTRUCTIONS  1. Wash hands before every dressing change.  2. After 24 hours, change dressing daily.  3. Wash the wound area with a mild soap, then rinse.  4. Gently pat dry with a sterile gauze or Q-tip.  5. Using a Q-tip, apply Vaseline or Aquaphor only over entire wound. Do NOT use Neosporin - as many people react to neomycin.  6. Finally, cover with a bandage or sterile non-stick gauze with micropore paper tape.  7. Repeat once daily until wound has healed.      Soap, water and shampoo will not hurt this area.    Do not go swimming or take baths, but showering is encouraged.    Limit use of the area where the procedure was done for a few days to allow for optimal healing.    If you experience bleeding:  Wash hands and  "hold firm pressure on the area for 10 minutes without checking to see if the bleeding has stopped. \"Checking\" pulls off the protective wound clot and restarts the bleeding all over again. Re-apply pressure for 10 minutes if necessary to stop bleeding.  Use additional sterile gauze and tape to maintain pressure once bleeding has stopped.  If bleeding continues, then call back to clinic at [phone].    Signs of Infection:  Infection can occur in any area where skin has been disrupted.  If you notice persistent redness, swelling, colored drainage, increasing pain, fever or other signs of infection, please call us at: [phone] and ask to have me or my colleague paged. We will call you back to discuss.    Pathology Results:  You will be notified, generally via letter or MyChart, in approximately 10 days. If there is anything we need to discuss or further treatment needed, I will call you to discuss it.    PATIENT INFORMATION : WOUNDS  During the healing process you will notice a number of changes. All wounds develop a small halo of redness surrounding the wound.  This means healing is occurring. Severe itching with extensive redness usually indicates sensitivity to the ointment or bandage tape used to dress the wound.  You should call our office if this develops.      Swelling  and/or discoloration around your surgical site is common, particularly when performed around the eye.    All wounds normally drain.  The larger the wound the more drainage there will be.  After 7-10 days, you will notice the wound beginning to shrink and new skin will begin to grow.  The wound is healed when you can see skin has formed over the entire area.  A healed wound has a healthy, shiny look to the surface and is red to dark pink in color to normalize.  Wounds may take approximately 4-6 weeks to heal.  Larger wounds may take 6-8 weeks. After the wound is healed you may discontinue dressing changes.    You may experience a " sensation of tightness as your wound heals. This is normal and will gradually subside.    Your healed wound may be sensitive to temperature changes. This sensitivity improves with time, but if you re having a lot of discomfort, try to avoid temperature extremes.    Patients frequently experience itching after their wound appears to have healed because of the continue healing under the skin.  Plain Vaseline will help relieve the itching.    CRYOTHERAPY POST-TREATMENT CARE INSTRUCTIONS  Liquid nitrogen is mildly uncomfortable when applied to the skin, but the discomfort rapidly subsides.    Post-Treatment:  You may experience burning and/or stinging immediately following the procedure. The discomfort from the procedure may persist over the next 12-24 hours. The area treated will look pinker and slightly swollen before the healing process begins. You may also notice redness, swelling, tenderness, weeping and crusts or scabs. Healing time is approximately 10-14 days.    Blister - You may or may notice blistering from the freezing. If you develop an uncomfortable blister from today's treatment, you may gently puncture this with a needle that has been cleaned with alcohol. However, do not remove the protective skin layer of the blister.    Scab - After a few days, you may notice scaliness or scab formation. Do not pick at the scabs because this may cause slower healing and a permanent scar.    The skin may appear temporarily darker at the treatment site, but this usually fades over a period of months, provided that the area is protected from the sun.    Care of the areas treated:    Wash the area with a mild cleanser.    Gently pat dry.    Do not rub.     Keep protected from the sun during the healing process and for a full year following treatment as the skin continues to remodel during this time.    If you experience dryness or persistent burning, you may use Vaseline or Aquaphor ointment sparingly.    Do not use  "Neosporin, as many people eventually develop a medication allergy, that can easily be confused with an infection, to Neomycin.    Return if:  If there is any concern that the lesion has persisted, make an appointment for a re-check. Healing time does vary depending on your individual healing process and the area of the body treated. Most patients will be healed in one month.    Signs of Infection:  Thankfully this is rare. However if you notice persistent colored drainage, increasing pain, fever or other signs of infection, please call back at [phone].    SUN PROTECTION INSTRUCTIONS  Sun damage can lead to skin cancer and premature aging of the skin.      The best way to protect from sun damage to your skin is to avoid the sun during peak hours (10 am - 2 pm) even on overcast days.      Use UPF sun-protective clothing, which while more expensive initially provides longer lasting coverage without having to worry about remembering to re-apply.  1. Wear a wide-brimmed hat and sunglasses.   2. Wear sun-protective clothing.  [employer] and other [employer] make sun protective clothing that are stylish, comfortable and cool. [employer] and other [employer] make UV arm sleeves suitable for golfing, gardening and other activities.      Sunscreen instructions:  1. Use sunscreens with Zinc Oxide, Titanium Dioxide or Avobenzone to protect from UVA rays.  2. Use SPF 30-50+ to protect from UVB rays.  3. Re-apply every 2 hours even if water resistant.  4. Apply on your face every day even when cloudy and even in the winter. UVA \"aging rays\" penetrate window glass and are just as strong in the winter as in the summer.    Product Recommendations:    Good examples include: Blue Lizard, EltaMD, Solbar    Good daily moisturizers with SPF: Vanicream, CeraVe.    For sensitive skin, consider : SkinMedica Essential Defense Mineral Shield Broad Spectrum SPF 35      Never use tanning beds. Tanning beds are associated with " "much higher risks of skin cancer.    All tanning damages the skin. Aim for ivory skin year round and you will have less trouble with your skin in years to come. There is no merit in getting \"a base tan\" before a warm weather vacation, as any tanning indicates your body's response to sun damage.    Stop smoking. Smokers have higher rates of skin cancer and also have premature skin wrinkling.    FYI  You should use about 3 tablespoons of sunscreen to protect your whole body. Thus a typical eight ounce bottle of sunscreen should last 4 applications. Remember, that the SPF rating is compromised if you don t apply enough. Most people only apply 1/2 - 1/3 of the amount they need. Also don t forget areas such as your ears, feet, upper back and harder to reach places. Keep in mind that these amounts should be increased for larger body sizes.    Sunscreens with titanium dioxide and/or zinc oxide in the active ingredients are physical blockers as opposed to chemical blockers. Chemical-free sunscreens should not irritate the skin.    Spray-on sunscreens may be used for touch-up application only, not as a base layer. Also, use with caution around small children due to inhalation risk.    Avoid retinyl palmitate products.    Avoid combination products that include both sunscreen and insect repellant, as sunscreen should be applied every 2 hours, but insect repellant should not be applied as frequently.    SPF means sun protection factor, which is just the degree to which the sunscreen can protect against UVB rays. There is no rating system for UVA rays. SPF is calculated as the time skin will burn when sunscreen is applied vs. skin without sunscreen.    Water resistant sunscreens should be re-applied every 1-2 hours.    For more information:  http://www.skincancer.org/prevention/sun-protection/sunscreen/sunscreens-safe-and-effective    CORN INSTRUCTIONS    Wash the affected area (and optionally, soak in warm water for 5 " minutes). Dry thoroughly.    A pumice stone, PedEgg or clean washcloth can be used to gently remove the softened layers of the corn.        PROCEDURES:                                                    Name : Shave Excision  Indication : Excision of tissue for pathology evaluation.  Location(s) : Left distal nose : 5 mm in size pink flesh-colored lesion with rolled borders and central superficial erosion. ? Basal cell carcinoma ? Other.  Completed by : Natasha Vines MD  Photo Taken : yes.  Anesthesia : Patient was anesthetized by infiltrating the area surrounding the lesion with 1% lidocaine.   epinephrine [id number] : Yes.  Buffered with bicarbonate : Yes.  Note : Discussed the risk of pain, infection, scarring, hypo- or hyperpigmentation and recurrence or need for re-treatment. The benefits of treatment and alternative treatments were also discussed.    During this procedure, the universal protocol was utilized. The patient's identity was confirmed by no less than two patient identifiers, correct procedure was verified, correct site was verified and marked as applicable and a final pause was completed.    Sterile technique was used throughout the procedure. The skin was cleaned and prepped with surgical cleanser. Once adequate anesthesia was obtained, the lesion was removed with a deep scallop shave procedure. The specimen was sent to pathology.    Direct pressure and aluminum chloride and monopolar cautery was applied for hemostasis. No bleeding was present upon the completion of the procedure. The wound was coated with antibacterial ointment. A dry sterile dressing was applied. Patient tolerated the procedure well and left in satisfactory condition.    Primary provider and referring provider will be informed regarding the tissue report when it returns.      The information in this document, created by the medical scribe for me, accurately reflects the services I personally performed and the decisions made by me.  I have reviewed and approved this document for accuracy prior to leaving the patient care area.  Natasha Vines MD April 26, 2018 11:50 AM  [facility] [place] [facility]    Again, thank you for allowing me to participate in the care of your patient.        Sincerely,        Marcia Vines MD

## 2018-04-26 NOTE — MR AVS SNAPSHOT
"              After Visit Summary   4/26/2018    Nick Villalobos    MRN: [id number]           Patient Information     Date Of Birth          1955        Visit Information        Provider Department      4/26/2018 12:00 PM Marcia Vines [place] [facility] [place]'s Diagnoses     Neoplasm of uncertain behavior of skin    -  1    Family history of nonmelanoma skin cancer        Corn or callus        Solar lentigo        Seborrheic keratoses        Multiple benign melanocytic nevi        Inflamed seborrheic keratosis          Care Instructions    FUTURE APPOINTMENTS  Follow up per pathology report.  Follow up annually for a full-body skin cancer screening.  Consider following up with Dr. Laura Caballero for consultation of laser treatment.    WOUND CARE INSTRUCTIONS  1. Wash hands before every dressing change.  2. After 24 hours, change dressing daily.  3. Wash the wound area with a mild soap, then rinse.  4. Gently pat dry with a sterile gauze or Q-tip.  5. Using a Q-tip, apply Vaseline or Aquaphor only over entire wound. Do NOT use Neosporin - as many people react to neomycin.  6. Finally, cover with a bandage or sterile non-stick gauze with micropore paper tape.  7. Repeat once daily until wound has healed.      Soap, water and shampoo will not hurt this area.    Do not go swimming or take baths, but showering is encouraged.    Limit use of the area where the procedure was done for a few days to allow for optimal healing.    If you experience bleeding:  Wash hands and hold firm pressure on the area for 10 minutes without checking to see if the bleeding has stopped. \"Checking\" pulls off the protective wound clot and restarts the bleeding all over again. Re-apply pressure for 10 minutes if necessary to stop bleeding.  Use additional sterile gauze and tape to maintain pressure once bleeding has stopped.  If bleeding continues, then call back to clinic at [phone].    Signs of " Infection:  Infection can occur in any area where skin has been disrupted.  If you notice persistent redness, swelling, colored drainage, increasing pain, fever or other signs of infection, please call us at: [phone] and ask to have me or my colleague paged. We will call you back to discuss.    Pathology Results:  You will be notified, generally via letter or MyChart, in approximately 10 days. If there is anything we need to discuss or further treatment needed, I will call you to discuss it.    PATIENT INFORMATION : WOUNDS  During the healing process you will notice a number of changes. All wounds develop a small halo of redness surrounding the wound.  This means healing is occurring. Severe itching with extensive redness usually indicates sensitivity to the ointment or bandage tape used to dress the wound.  You should call our office if this develops.      Swelling  and/or discoloration around your surgical site is common, particularly when performed around the eye.    All wounds normally drain.  The larger the wound the more drainage there will be.  After 7-10 days, you will notice the wound beginning to shrink and new skin will begin to grow.  The wound is healed when you can see skin has formed over the entire area.  A healed wound has a healthy, shiny look to the surface and is red to dark pink in color to normalize.  Wounds may take approximately 4-6 weeks to heal.  Larger wounds may take 6-8 weeks. After the wound is healed you may discontinue dressing changes.    You may experience a sensation of tightness as your wound heals. This is normal and will gradually subside.    Your healed wound may be sensitive to temperature changes. This sensitivity improves with time, but if you re having a lot of discomfort, try to avoid temperature extremes.    Patients frequently experience itching after their wound appears to have healed because of the continue healing under the skin.  Plain Vaseline will help  relieve the itching.    CRYOTHERAPY POST-TREATMENT CARE INSTRUCTIONS  Liquid nitrogen is mildly uncomfortable when applied to the skin, but the discomfort rapidly subsides.    Post-Treatment:  You may experience burning and/or stinging immediately following the procedure. The discomfort from the procedure may persist over the next 12-24 hours. The area treated will look pinker and slightly swollen before the healing process begins. You may also notice redness, swelling, tenderness, weeping and crusts or scabs. Healing time is approximately 10-14 days.    Blister - You may or may notice blistering from the freezing. If you develop an uncomfortable blister from today's treatment, you may gently puncture this with a needle that has been cleaned with alcohol. However, do not remove the protective skin layer of the blister.    Scab - After a few days, you may notice scaliness or scab formation. Do not pick at the scabs because this may cause slower healing and a permanent scar.    The skin may appear temporarily darker at the treatment site, but this usually fades over a period of months, provided that the area is protected from the sun.    Care of the areas treated:    Wash the area with a mild cleanser.    Gently pat dry.    Do not rub.     Keep protected from the sun during the healing process and for a full year following treatment as the skin continues to remodel during this time.    If you experience dryness or persistent burning, you may use Vaseline or Aquaphor ointment sparingly.    Do not use Neosporin, as many people eventually develop a medication allergy, that can easily be confused with an infection, to Neomycin.    Return if:  If there is any concern that the lesion has persisted, make an appointment for a re-check. Healing time does vary depending on your individual healing process and the area of the body treated. Most patients will be healed in one month.    Signs of Infection:  Thankfully this is rare.  "However if you notice persistent colored drainage, increasing pain, fever or other signs of infection, please call back at [phone].    SUN PROTECTION INSTRUCTIONS  Sun damage can lead to skin cancer and premature aging of the skin.      The best way to protect from sun damage to your skin is to avoid the sun during peak hours (10 am - 2 pm) even on overcast days.      Use UPF sun-protective clothing, which while more expensive initially provides longer lasting coverage without having to worry about remembering to re-apply.  1. Wear a wide-brimmed hat and sunglasses.   2. Wear sun-protective clothing.  [employer] and other amcure make sun protective clothing that are stylish, comfortable and cool. [employer] and other amcure make UV arm sleeves suitable for golfing, gardening and other activities.      Sunscreen instructions:  1. Use sunscreens with Zinc Oxide, Titanium Dioxide or Avobenzone to protect from UVA rays.  2. Use SPF 30-50+ to protect from UVB rays.  3. Re-apply every 2 hours even if water resistant.  4. Apply on your face every day even when cloudy and even in the winter. UVA \"aging rays\" penetrate window glass and are just as strong in the winter as in the summer.    Product Recommendations:    Good examples include: Blue Esau, EltaMD, Solbar    Good daily moisturizers with SPF: Vanicream, CeraVe.    For sensitive skin, consider : SkinMedica Essential Defense Mineral Shield Broad Spectrum SPF 35      Never use tanning beds. Tanning beds are associated with much higher risks of skin cancer.    All tanning damages the skin. Aim for ivory skin year round and you will have less trouble with your skin in years to come. There is no merit in getting \"a base tan\" before a warm weather vacation, as any tanning indicates your body's response to sun damage.    Stop smoking. Smokers have higher rates of skin cancer and also have premature skin wrinkling.    FYI  You should use about 3 " tablespoons of sunscreen to protect your whole body. Thus a typical eight ounce bottle of sunscreen should last 4 applications. Remember, that the SPF rating is compromised if you don t apply enough. Most people only apply 1/2 - 1/3 of the amount they need. Also don t forget areas such as your ears, feet, upper back and harder to reach places. Keep in mind that these amounts should be increased for larger body sizes.    Sunscreens with titanium dioxide and/or zinc oxide in the active ingredients are physical blockers as opposed to chemical blockers. Chemical-free sunscreens should not irritate the skin.    Spray-on sunscreens may be used for touch-up application only, not as a base layer. Also, use with caution around small children due to inhalation risk.    Avoid retinyl palmitate products.    Avoid combination products that include both sunscreen and insect repellant, as sunscreen should be applied every 2 hours, but insect repellant should not be applied as frequently.    SPF means sun protection factor, which is just the degree to which the sunscreen can protect against UVB rays. There is no rating system for UVA rays. SPF is calculated as the time skin will burn when sunscreen is applied vs. skin without sunscreen.    Water resistant sunscreens should be re-applied every 1-2 hours.    For more information:  http://www.skincancer.org/prevention/sun-protection/sunscreen/sunscreens-safe-and-effective    CORN INSTRUCTIONS    Wash the affected area (and optionally, soak in warm water for 5 minutes). Dry thoroughly.    A pumice stone, PedEgg or clean washcloth can be used to gently remove the softened layers of the corn.          Follow-ups after your visit        Who to contact     If you have questions or need follow up information about today's clinic visit or your schedule please contact [facility] [place] directly at [phone].  Normal or non-critical lab and imaging results will be communicated  "to you by "[employer]hart, letter or phone within 4 business days after the clinic has received the results. If you do not hear from us within 7 days, please contact the clinic through [employer] or phone. If you have a critical or abnormal lab result, we will notify you by phone as soon as possible.  Submit refill requests through [employer] or call your pharmacy and they will forward the refill request to us. Please allow 3 business days for your refill to be completed.          Additional Information About Your Visit        "[employer] Information     [employer] lets you send messages to your doctor, view your test results, renew your prescriptions, schedule appointments and more. To sign up, go to [email or web address] . Click on \"Log in\" on the left side of the screen, which will take you to the Welcome page. Then click on \"Sign up Now\" on the right side of the page.     You will be asked to enter the access code listed below, as well as some personal information. Please follow the directions to create your username and password.     Your access code is: [email or web address]  Expires: 2018  6:05 PM     Your access code will  in 90 days. If you need help or a new code, please call your [facility] clinic or [phone].        Care EveryWhere ID     This is your Care EveryWhere ID. This could be used by other organizations to access your [facility] medical records  [id number]        Your Vitals Were     Pulse Pulse Oximetry                52 98%           Blood Pressure from Last 3 Encounters:   18 140/90   18 110/70   17 110/78    Weight from Last 3 Encounters:   18 219 lb (99.3 kg)   17 206 lb (93.4 kg)   17 205 lb (93 kg)              We Performed the Following     Dermatological path order and indications     DESTRUCT BENIGN LESION, UP TO 14     SHAV SKIN LESION FACE/EARS <=0.5 CM        Primary Care Provider Office Phone # Fax #    [facility] [phone]       " [address] [place] [id number]        Equal Access to Services     ROBERTOJADEN MICHELLE : Hadii aleta loepz ramesh Sosarahali, waaxda luqadaha, qaybta kaalmada gaetanotelmafranck, abel martinez josiahchikis churchramirosolitario cheema. So [facility] [phone].    ATENCIÓN: Si habla español, tiene a padilla disposición servicios gratuitos de asistencia lingüística. [facility] [phone].    We comply with applicable federal civil rights laws and [place] laws. We do not discriminate on the basis of race, color, national origin, age, disability, sex, sexual orientation, or gender identity.            Thank you!     Thank you for choosing [facility]  for your care. Our goal is always to provide you with excellent care. Hearing back from our patients is one way we can continue to improve our services. Please take a few minutes to complete the written survey that you may receive in the mail after your visit with us. Thank you!             Your Updated Medication List - Protect others around you: Learn how to safely use, store and throw away your medicines at www.disposemymeds.org.          This list is accurate as of 4/26/18 12:38 PM.  Always use your most recent med list.                   Brand Name Dispense Instructions for use Diagnosis    diazepam 5 MG tablet    VALIUM    30 tablet    Take 1 tablet (5 mg) by mouth every 8 hours as needed for anxiety or sleep (muscle spasm)    Left knee pain, unspecified chronicity       oxyCODONE-acetaminophen 5-325 MG per tablet    PERCOCET    30 tablet    Take 1 tablet by mouth every 4 hours as needed for pain maximum 4 tablet(s) per day    Closed nondisplaced comminuted fracture of left patella with routine healing, subsequent encounter

## 2018-04-26 NOTE — PROGRESS NOTES
"[facility] - PRIMARY CARE SKIN    CC : Lesion(s)  SUBJECTIVE:                                                    Nick Villalobos is a 62 year old male who presents to clinic today with wife because of a lesion on the nose.    Bothersome lesions noticed by the patient or other skin concerns :  Issue One : Bump on nose for a couple of weeks. He has previously scratched it with off subsequent bleeding.  He has used benzoyl peroxide, rubbing alcohol and neosporin, but the lesion has not resolved.    Issue Two : Wart on foot. He reports a history of wart on right hand treated with cryotherapy; it may not have fully resolved. He has tried to \"dig it out\".    Issue Three : He also requests evaluation of a colored lesion on the hand.    Issue Four : He is concerned about a bump on the back of the neck. He suspects that a merry bit him. He has tried to squeeze it. He has also applied rubbing alcohol.    Issue Five : He is concerned about prominent veins on the face.    Personal history of skin cancer : NO.  Family history of skin cancer : YES -non-melanoma skin cancer in grandmother.    Sun Exposure History  Previous history of significant sun exposure: significant tanning history with baby oil  Blistering sunburns : YES - red burns  Tanning beds : YES - 10 times in lifetime.  Sunscreen Use : YES, SPF : 25-50.  Sun-protective clothing use : No  Wide-brimmed hats : No  Sunglasses : No    Occupation : sells coins, previously  (both indoor & outdoor).    Refer to electronic medical record (EMR) for past medical history and medications.    INTEGUMENTARY/SKIN: POSITIVE for non-healing lesions  ROS : 14 point review of systems was negative except the symptoms listed above in the HPI.    This document serves as a record of the services and decisions personally performed and made by Natasha Vines MD. It was created on her behalf by Major Gaspar, a trained medical scribe.  The creation of this document is " based on the scribe's personal observations and the provider's statements to the medical scribe.  Mjaor Gaspar, April 26, 2018 11:50 AM      OBJECTIVE:                                                    GENERAL: healthy, alert and no distress  SKIN: Hoang Skin Type - II.  Face were examined. The dermatoscope was used to help evaluate pigmented lesions.  Skin Pertinent Findings:  Left distal nose : 5 mm in size pink flesh-colored lesion with rolled borders and central superficial erosion. ? Basal cell carcinoma ? Other      Right hand, palmar surface : hyperkeratotic tissue most consistent with corn    Left dorsal hand over second metacarpal : brown, macule(s) most consistent with benign solar lentigo.    Back : Multiple brown, macule(s) most consistent with benign solar lentigo. Scattered stuck-on appearing papules, raised, brown, coarse-textured, round lesion(s) most consistent with seborrheic keratoses. Scattered brown macules of various sizes and shapes most consistent with (benign) melanocytic nevi.    Posterior neck : no suspicious characteristics.    Right foot, plantar surface, between 1st and 2nd metatarsal : 3 mm in size hyperkeratotic tissue most consistent with corn or callus.  Procedure: calluses trimmed.  No anesthesia, EBL none.  Pt verbal consent.  Not sent to path.    Left medial proximal thigh : 3 mm in size stuck-on appearing papules, raised, brown, coarse-textured, round lesion(s) most consistent with seborrheic keratoses.  Name : Liquid Nitrogen Cry-Ac Cryotherapy.  Indication : Irritated/Inflamed Benign Lesion.  Location(s) : left thigh - x1.  Completed by : Natasha Vines MD  Note : Discussed natural history of lesion and treatment options. Prior to treatment, we discussed inflammation, tenderness post-procedure, the healing process, and the risks of pain, infection, scarring, blistering, and hypo-/hyperpigmentation after healing. Explained that these lesions may grow back and may need  "additional treatment or re-treatment. The patient expressed a desire to proceed with cryotherapy.    The lesion(s) were treated with liquid nitrogen Cry-Ac, five second freeze repeated twice with a pause to allow for the area to thaw.    Patient tolerated the procedure well and left in good condition.  Total number of lesions treated : 1.  Treatment number : 1.    Diagnostic Test Results:  none           ASSESSMENT:                                                      Encounter Diagnoses   Name Primary?     Neoplasm of uncertain behavior of skin Yes     Family history of nonmelanoma skin cancer      Corn or callus      Solar lentigo      Seborrheic keratoses      Multiple benign melanocytic nevi      Inflamed seborrheic keratosis          PLAN:                                                    Patient Instructions   FUTURE APPOINTMENTS  Follow up per pathology report.  Follow up annually for a full-body skin cancer screening.    WOUND CARE INSTRUCTIONS  1. Wash hands before every dressing change.  2. After 24 hours, change dressing daily.  3. Wash the wound area with a mild soap, then rinse.  4. Gently pat dry with a sterile gauze or Q-tip.  5. Using a Q-tip, apply Vaseline or Aquaphor only over entire wound. Do NOT use Neosporin - as many people react to neomycin.  6. Finally, cover with a bandage or sterile non-stick gauze with micropore paper tape.  7. Repeat once daily until wound has healed.      Soap, water and shampoo will not hurt this area.    Do not go swimming or take baths, but showering is encouraged.    Limit use of the area where the procedure was done for a few days to allow for optimal healing.    If you experience bleeding:  Wash hands and hold firm pressure on the area for 10 minutes without checking to see if the bleeding has stopped. \"Checking\" pulls off the protective wound clot and restarts the bleeding all over again. Re-apply pressure for 10 minutes if necessary to stop bleeding.  Use " additional sterile gauze and tape to maintain pressure once bleeding has stopped.  If bleeding continues, then call back to clinic at [phone].    Signs of Infection:  Infection can occur in any area where skin has been disrupted.  If you notice persistent redness, swelling, colored drainage, increasing pain, fever or other signs of infection, please call us at: [phone] and ask to have me or my colleague paged. We will call you back to discuss.    Pathology Results:  You will be notified, generally via letter or MyChart, in approximately 10 days. If there is anything we need to discuss or further treatment needed, I will call you to discuss it.    PATIENT INFORMATION : WOUNDS  During the healing process you will notice a number of changes. All wounds develop a small halo of redness surrounding the wound.  This means healing is occurring. Severe itching with extensive redness usually indicates sensitivity to the ointment or bandage tape used to dress the wound.  You should call our office if this develops.      Swelling  and/or discoloration around your surgical site is common, particularly when performed around the eye.    All wounds normally drain.  The larger the wound the more drainage there will be.  After 7-10 days, you will notice the wound beginning to shrink and new skin will begin to grow.  The wound is healed when you can see skin has formed over the entire area.  A healed wound has a healthy, shiny look to the surface and is red to dark pink in color to normalize.  Wounds may take approximately 4-6 weeks to heal.  Larger wounds may take 6-8 weeks. After the wound is healed you may discontinue dressing changes.    You may experience a sensation of tightness as your wound heals. This is normal and will gradually subside.    Your healed wound may be sensitive to temperature changes. This sensitivity improves with time, but if you re having a lot of discomfort, try to avoid temperature  extremes.    Patients frequently experience itching after their wound appears to have healed because of the continue healing under the skin.  Plain Vaseline will help relieve the itching.    CRYOTHERAPY POST-TREATMENT CARE INSTRUCTIONS  Liquid nitrogen is mildly uncomfortable when applied to the skin, but the discomfort rapidly subsides.    Post-Treatment:  You may experience burning and/or stinging immediately following the procedure. The discomfort from the procedure may persist over the next 12-24 hours. The area treated will look pinker and slightly swollen before the healing process begins. You may also notice redness, swelling, tenderness, weeping and crusts or scabs. Healing time is approximately 10-14 days.    Blister - You may or may notice blistering from the freezing. If you develop an uncomfortable blister from today's treatment, you may gently puncture this with a needle that has been cleaned with alcohol. However, do not remove the protective skin layer of the blister.    Scab - After a few days, you may notice scaliness or scab formation. Do not pick at the scabs because this may cause slower healing and a permanent scar.    The skin may appear temporarily darker at the treatment site, but this usually fades over a period of months, provided that the area is protected from the sun.    Care of the areas treated:    Wash the area with a mild cleanser.    Gently pat dry.    Do not rub.     Keep protected from the sun during the healing process and for a full year following treatment as the skin continues to remodel during this time.    If you experience dryness or persistent burning, you may use Vaseline or Aquaphor ointment sparingly.    Do not use Neosporin, as many people eventually develop a medication allergy, that can easily be confused with an infection, to Neomycin.    Return if:  If there is any concern that the lesion has persisted, make an appointment for a re-check. Healing time does vary  "depending on your individual healing process and the area of the body treated. Most patients will be healed in one month.    Signs of Infection:  Thankfully this is rare. However if you notice persistent colored drainage, increasing pain, fever or other signs of infection, please call back at [phone].    SUN PROTECTION INSTRUCTIONS  Sun damage can lead to skin cancer and premature aging of the skin.      The best way to protect from sun damage to your skin is to avoid the sun during peak hours (10 am - 2 pm) even on overcast days.      Use UPF sun-protective clothing, which while more expensive initially provides longer lasting coverage without having to worry about remembering to re-apply.  1. Wear a wide-brimmed hat and sunglasses.   2. Wear sun-protective clothing.  [employer] and other [employer] make sun protective clothing that are stylish, comfortable and cool. [employer] and other [employer] make UV arm sleeves suitable for golfing, gardening and other activities.      Sunscreen instructions:  1. Use sunscreens with Zinc Oxide, Titanium Dioxide or Avobenzone to protect from UVA rays.  2. Use SPF 30-50+ to protect from UVB rays.  3. Re-apply every 2 hours even if water resistant.  4. Apply on your face every day even when cloudy and even in the winter. UVA \"aging rays\" penetrate window glass and are just as strong in the winter as in the summer.    Product Recommendations:    Good examples include: Blue Lizard, EltaMD, Solbar    Good daily moisturizers with SPF: Vanicream, CeraVe.    For sensitive skin, consider : SkinMedica Essential Defense Mineral Shield Broad Spectrum SPF 35      Never use tanning beds. Tanning beds are associated with much higher risks of skin cancer.    All tanning damages the skin. Aim for ivory skin year round and you will have less trouble with your skin in years to come. There is no merit in getting \"a base tan\" before a warm weather vacation, as any tanning " indicates your body's response to sun damage.    Stop smoking. Smokers have higher rates of skin cancer and also have premature skin wrinkling.    FYI  You should use about 3 tablespoons of sunscreen to protect your whole body. Thus a typical eight ounce bottle of sunscreen should last 4 applications. Remember, that the SPF rating is compromised if you don t apply enough. Most people only apply 1/2 - 1/3 of the amount they need. Also don t forget areas such as your ears, feet, upper back and harder to reach places. Keep in mind that these amounts should be increased for larger body sizes.    Sunscreens with titanium dioxide and/or zinc oxide in the active ingredients are physical blockers as opposed to chemical blockers. Chemical-free sunscreens should not irritate the skin.    Spray-on sunscreens may be used for touch-up application only, not as a base layer. Also, use with caution around small children due to inhalation risk.    Avoid retinyl palmitate products.    Avoid combination products that include both sunscreen and insect repellant, as sunscreen should be applied every 2 hours, but insect repellant should not be applied as frequently.    SPF means sun protection factor, which is just the degree to which the sunscreen can protect against UVB rays. There is no rating system for UVA rays. SPF is calculated as the time skin will burn when sunscreen is applied vs. skin without sunscreen.    Water resistant sunscreens should be re-applied every 1-2 hours.    For more information:  http://www.skincancer.org/prevention/sun-protection/sunscreen/sunscreens-safe-and-effective    CORN INSTRUCTIONS    Wash the affected area (and optionally, soak in warm water for 5 minutes). Dry thoroughly.    A pumice stone, PedEgg or clean washcloth can be used to gently remove the softened layers of the corn.        PROCEDURES:                                                    Name : Shave Excision  Indication : Excision of tissue  for pathology evaluation.  Location(s) : Left distal nose : 5 mm in size pink flesh-colored lesion with rolled borders and central superficial erosion. ? Basal cell carcinoma ? Other.  Completed by : Natasha Vines MD  Photo Taken : yes.  Anesthesia : Patient was anesthetized by infiltrating the area surrounding the lesion with 1% lidocaine.   epinephrine [id number] : Yes.  Buffered with bicarbonate : Yes.  Note : Discussed the risk of pain, infection, scarring, hypo- or hyperpigmentation and recurrence or need for re-treatment. The benefits of treatment and alternative treatments were also discussed.    During this procedure, the universal protocol was utilized. The patient's identity was confirmed by no less than two patient identifiers, correct procedure was verified, correct site was verified and marked as applicable and a final pause was completed.    Sterile technique was used throughout the procedure. The skin was cleaned and prepped with surgical cleanser. Once adequate anesthesia was obtained, the lesion was removed with a deep scallop shave procedure. The specimen was sent to pathology.    Direct pressure and aluminum chloride and monopolar cautery was applied for hemostasis. No bleeding was present upon the completion of the procedure. The wound was coated with antibacterial ointment. A dry sterile dressing was applied. Patient tolerated the procedure well and left in satisfactory condition.    Primary provider and referring provider will be informed regarding the tissue report when it returns.      The information in this document, created by the medical scribe for me, accurately reflects the services I personally performed and the decisions made by me. I have reviewed and approved this document for accuracy prior to leaving the patient care area.  Natasha Vines MD April 26, 2018 11:50 AM  [facility] [place]

## 2018-04-30 LAB — COPATH REPORT: NORMAL

## 2018-05-01 ENCOUNTER — TELEPHONE (OUTPATIENT)
Dept: FAMILY MEDICINE | Facility: CLINIC | Age: 63
End: 2018-05-01

## 2018-05-01 NOTE — LETTER
[facility]  [address] [place] [address]  [phone]      5/2/2018       Nick Villalobos  [facility] [place] [address]      Dear Nick:    You are scheduled for Mohs Surgery on: Tuesday, May 15, 2018 at 7:30 am    Please check in at 2nd floor Dermatology [facility].     You don't need to arrive more than 5-10 minutes prior to your appointment time.     Be sure to eat a good breakfast and bathe and wash your hair prior to Surgery. Please bring  with you.     If you are taking any anti-coagulants that are prescribed by your Doctor (such as Coumadin/warfarin, Plavix, Aspirin, Ibuprofen), please continue taking them.     However, If you are taking anti-coagulants over the counter without  a Doctor's order for a Medical condition, please discontinue them 10 days prior to Surgery.      Please wear loose comfortable clothing as it could possibly be 4-6 hours until your surgery is completed depending upon how many layers of tissue need to be removed.       WHIT Quiroz MD

## 2018-05-01 NOTE — TELEPHONE ENCOUNTER
Encounter : voicemail        Pathology report :  Basal cell carcinoma, infiltrating type, extending to the deep margin -       Recommendations :  Mohs surgery        Schedule for total body skin exam with myself  Asked the patient to call back to  to discuss the result.  Left information regarding the pathology report :  Yes

## 2018-05-02 NOTE — TELEPHONE ENCOUNTER
patient notified of test results and mohs procedure explained- appointment scheduled- letter mailed.    Ntaali HAAS RN  [facility]  [phone]  [facility]   [phone]

## 2018-05-08 ENCOUNTER — TELEPHONE (OUTPATIENT)
Dept: FAMILY MEDICINE | Facility: CLINIC | Age: 63
End: 2018-05-08

## 2018-05-08 NOTE — TELEPHONE ENCOUNTER
Patient left a voice mail on the nurse line:  He has not received the information for the MOHS procedure that is scheduled 05/15/18  And he is wondering if there were any cancellations for the [place] location  Advised that the letter was mailed on Thursday and there are no openings at [place] but I would check daily  patient verbalized understanding and will check his mail for the letter    Natali Santana RN BSN  [facility]  [phone]

## 2018-05-14 ENCOUNTER — TELEPHONE (OUTPATIENT)
Dept: FAMILY MEDICINE | Facility: CLINIC | Age: 63
End: 2018-05-14

## 2018-05-14 NOTE — TELEPHONE ENCOUNTER
Patient concerned about having a - advised that any procedure on the face the provider   Procedure is on the nose- advised that I cannot guarantee that he will be able to see to drive himself due to the bandaging.  Patient states that he will drive there himself and if his vision is impaired his wife or daughter will bring him home- states that daughter lives in the area    Natali Santana RN BSN  [facility]  [phone]

## 2018-05-15 ENCOUNTER — OFFICE VISIT (OUTPATIENT)
Dept: DERMATOLOGY | Facility: CLINIC | Age: 63
End: 2018-05-15
Payer: COMMERCIAL

## 2018-05-15 VITALS — HEART RATE: 61 BPM | DIASTOLIC BLOOD PRESSURE: 69 MMHG | SYSTOLIC BLOOD PRESSURE: 127 MMHG | OXYGEN SATURATION: 97 %

## 2018-05-15 DIAGNOSIS — D18.00 ANGIOMA: ICD-10-CM

## 2018-05-15 DIAGNOSIS — L82.1 SK (SEBORRHEIC KERATOSIS): ICD-10-CM

## 2018-05-15 DIAGNOSIS — C44.311 BASAL CELL CARCINOMA OF NOSE: Primary | ICD-10-CM

## 2018-05-15 DIAGNOSIS — L81.4 LENTIGO: ICD-10-CM

## 2018-05-15 PROCEDURE — 99243 OFF/OP CNSLTJ NEW/EST LOW 30: CPT | Mod: 57 | Performed by: DERMATOLOGY

## 2018-05-15 PROCEDURE — 17311 MOHS 1 STAGE H/N/HF/G: CPT | Mod: 51 | Performed by: DERMATOLOGY

## 2018-05-15 PROCEDURE — 17312 MOHS ADDL STAGE: CPT | Performed by: DERMATOLOGY

## 2018-05-15 PROCEDURE — 14061 TIS TRNFR E/N/E/L10.1-30SQCM: CPT | Performed by: DERMATOLOGY

## 2018-05-15 NOTE — NURSING NOTE
"Initial /69  Pulse 61  SpO2 97% Estimated body mass index is 32.34 kg/(m^2) as calculated from the following:    Height as of 2/16/18: 1.753 m (5' 9\").    Weight as of 2/16/18: 99.3 kg (219 lb). .      "

## 2018-05-15 NOTE — MR AVS SNAPSHOT
After Visit Summary   5/15/2018    Nick Villalobos    MRN: [id number]           Patient Information     Date Of Birth          1955        Visit Information        Provider Department      5/15/2018 7:30 AM Alcides Quiroz MD [facility]        Today's Diagnoses     Basal cell carcinoma of nose    -  1    Lentigo        SK (seborrheic keratosis)        Angioma          Care Instructions      Sutured Wound Care     [facility]: [phone]    [facility]: [phone]    nose      ? No strenuous activity for 48 hours. Resume moderate activity in 48 hours. No heavy exercising until you are seen for follow up in one week.     ? Take Tylenol as needed for discomfort.                         ? Do not drink alcoholic beverages for 48 hours.     ? Keep the pressure bandage in place for 24 hours. If the bandage becomes blood tinged or loose, reinforce it with gauze and tape.        (Refer to the reverse side of this page for management of bleeding).    ? Remove pressure bandage in 24 hours     ? Leave the flat bandage in place until your follow up appointment.    ? Keep the bandage dry. Wash around it carefully.    ? If the tape becomes soiled or starts to come off, reinforce it with additional paper tape.    ? Do not smoke for 3 weeks; smoking is detrimental to wound healing.    ? It is normal to have swelling and bruising around the surgical site. The bruising will fade in approximately 10-14 days. Elevate the area to reduce swelling.    ? Numbness, itchiness and sensitivity to temperature changes can occur after surgery and may take up to 18 months to normalize.      POSSIBLE COMPLICATIONS    BLEEDIN. Leave the bandage in place.  2. Use tightly rolled up gauze or a cloth to apply direct pressure over the bandage for 20   minutes.  3. Reapply pressure for an additional 20 minutes if necessary  4. Call the office or go to the nearest emergency room if  "pressure fails to stop the bleeding.  5. Use additional gauze and tape to maintain pressure once the bleeding has stopped.        PAIN:    1. Post operative pain should slowly get better, never worse.  2. A severe increase in pain may indicate a problem. Call the office if this occurs.    In case of emergency phone:Dr Quiroz [phone]              Follow-ups after your visit        Follow-up notes from your care team     Return in about 1 week (around 2018) for BANDAGE CHANGE.      Who to contact     If you have questions or need follow up information about today's clinic visit or your schedule please contact [facility] directly at [phone].  Normal or non-critical lab and imaging results will be communicated to you by Winkcamhart, letter or phone within 4 business days after the clinic has received the results. If you do not hear from us within 7 days, please contact the clinic through Winkcamhart or phone. If you have a critical or abnormal lab result, we will notify you by phone as soon as possible.  Submit refill requests through [employer] or call your pharmacy and they will forward the refill request to us. Please allow 3 business days for your refill to be completed.          Additional Information About Your Visit        MyChart Information     [employer] lets you send messages to your doctor, view your test results, renew your prescriptions, schedule appointments and more. To sign up, go to [email or web address] . Click on \"Log in\" on the left side of the screen, which will take you to the Welcome page. Then click on \"Sign up Now\" on the right side of the page.     You will be asked to enter the access code listed below, as well as some personal information. Please follow the directions to create your username and password.     Your access code is: [email or web address]  Expires: 2018  6:05 PM     Your access code will  in 90 days. If you need help or a new code, please call your [place] " clinic or [phone].        Care EveryWhere ID     This is your Care EveryWhere ID. This could be used by other organizations to access your [place] medical records  [id number]        Your Vitals Were     Pulse Pulse Oximetry                61 97%           Blood Pressure from Last 3 Encounters:   05/15/18 127/69   04/26/18 140/90   02/16/18 110/70    Weight from Last 3 Encounters:   02/16/18 99.3 kg (219 lb)   08/04/17 93.4 kg (206 lb)   07/14/17 93 kg (205 lb)              We Performed the Following     42535 - ADJ TISSUE XFER LID/NOS/EAR/LIP 10.1-30 CM     MOHS HEAD/NCK/HND/FT/GEN 1ST STAGE UP T0 5 BLOCKS     MOHS HEAD/NCK/HND/FT/GEN EA ADDTL STAGE UP T0 5 BLOCKS        Primary Care Provider Office Phone # Fax #    [facility] [phone]       [address] [place] [id number]        Equal Access to Services     MARTIN MARTINEZ : Hadii aad ku hadasho Soomaali, waaxda luqadaha, qaybta kaalmada adeegyada, waxay idiin hayarenn gaetano rendon . So [facility] [phone].    ATENCIÓN: Si habla español, tiene a padilla disposición servicios gratuitos de asistencia lingüística. Llame al [phone].    We comply with applicable federal civil rights laws and [place] laws. We do not discriminate on the basis of race, color, national origin, age, disability, sex, sexual orientation, or gender identity.            Thank you!     Thank you for choosing [facility]  for your care. Our goal is always to provide you with excellent care. Hearing back from our patients is one way we can continue to improve our services. Please take a few minutes to complete the written survey that you may receive in the mail after your visit with us. Thank you!             Your Updated Medication List - Protect others around you: Learn how to safely use, store and throw away your medicines at www.disposemymeds.org.      Notice  As of 5/15/2018  9:47 AM    You have not been prescribed any  medications.

## 2018-05-15 NOTE — PROGRESS NOTES
Nick Villalobos , a 62 year old year old male patient, I was asked to see by Dr. Vines for basal cell carcinoma on left nasal tip.  Patient states this has been present for a while.  Patient reports the following symptoms:  bleeding .  Patient reports the following previous treatments none.  Patient reports the following modifying factors none.  Associated symptoms: none.  Patient has no other skin complaints today.  Remainder of the HPI, Meds, PMH, Allergies, FH, and SH was reviewed in chart.      Past Medical History:   Diagnosis Date     Basal cell carcinoma      Esophageal reflux      Family history of malignant neoplasm of prostate 5/10/2007    Father at 53       Past Surgical History:   Procedure Laterality Date     HC COLONOSCOPY THRU STOMA, DIAGNOSTIC  6/15/07    Hyperplastic Polyp.  Needs repeat in 5 years     SURGICAL HISTORY OF -       tendon repair in right hand        Family History   Problem Relation Age of Onset     Prostate Cancer Father      Cancer - colorectal Father      C.A.D. Father       at 67, presumed MI     Hypertension Father      CEREBROVASCULAR DISEASE Mother      at 64       Social History     Social History     Marital status:      Spouse name: Cheryl     Number of children: 3     Years of education: N/A     Occupational History     Not on file.     Social History Main Topics     Smoking status: Former Smoker     Years: 30.00     Quit date: 2001     Smokeless tobacco: Never Used     Alcohol use Yes      Comment: 2x per month     Drug use: No     Sexual activity: Yes     Partners: Female     Other Topics Concern     Blood Transfusions No     Caffeine Concern No     Sleep Concern No     Exercise Yes     Runs 30-40 miles a month     Seat Belt Yes     Parent/Sibling W/ Cabg, Mi Or Angioplasty Before 65f 55m? Yes     Social History Narrative    [place] [employer]       Outpatient Encounter Prescriptions as of 5/15/2018   Medication Sig Dispense Refill     [DISCONTINUED]  diazepam (VALIUM) 5 MG tablet Take 1 tablet (5 mg) by mouth every 8 hours as needed for anxiety or sleep (muscle spasm) (Patient not taking: Reported on 4/26/2018) 30 tablet 0     [DISCONTINUED] oxyCODONE-acetaminophen (PERCOCET) 5-325 MG per tablet Take 1 tablet by mouth every 4 hours as needed for pain maximum 4 tablet(s) per day (Patient not taking: Reported on 4/26/2018) 30 tablet 0     No facility-administered encounter medications on file as of 5/15/2018.              Review Of Systems  Skin: As above  Eyes: negative  Ears/Nose/Throat: negative  Respiratory: No shortness of breath, dyspnea on exertion, cough, or hemoptysis  Cardiovascular: negative  Gastrointestinal: negative  Genitourinary: negative  Musculoskeletal: negative  Neurologic: negative  Psychiatric: negative  Hematologic/Lymphatic/Immunologic: negative  Endocrine: negative      O:   NAD, WDWN, Alert & Oriented, Mood & Affect wnl, Vitals stable   Here today alone   /69  Pulse 61  SpO2 97%   General appearance kiesha ii   Vitals stable   Alert, oriented and in no acute distress      Following lymph nodes palpated: Occipital, Cervical, Supraclavicular no lad   L nasal tip 5mm scaly papule    Stuck on papules and brown macules on trunk and ext    Red papule son trunk         The remainder of the full exam was unremarkable; the following areas were examined:  conjunctiva/lids, oral mucosa, neck, peripheral vascular system, abdomen, lymph nodes, digits/nails, eccrine and apocrine glands, scalp/hair, face, neck, chest, abdomen, buttocks, back, RUE, LUE, RLE, LLE       Eyes: Conjunctivae/lids:Normal     ENT: Lips, buccal mucosa, tongue: normal    MSK:Normal    Cardiovascular: peripheral edema none    Pulm: Breathing Normal    Lymph Nodes: No Head and Neck Lymphadenopathy     Neuro/Psych: Orientation:Normal; Mood/Affect:Normal      A/P:  1. Basal cell carcinoma nose  2. Seborrheic keratosis, letnigo, angioma  Patient to follow up with Primary Care  provider regarding elevated blood pressure.  BENIGN LESIONS DISCUSSED WITH PATIENT:  I discussed the specifics of tumor, prognosis, and genetics of benign lesions.  I explained that treatment of these lesions would be purely cosmetic and not medically neccessary.  I discussed with patient different removal options including excision, cautery and /or laser.      Nature and genetics of benign skin lesions dicussed with patient.  Signs and Symptoms of skin cancer discussed with patient.  Patient encouraged to perform monthly skin exams.  UV precautions reviewed with patient.  Patient to follow up with Primary Care provider regarding elevated blood pressure.    Skin care regimen reviewed with patient: Eliminate harsh soaps, i.e. Dial, zest, irsih spring; Mild soaps such as Cetaphil or Dove sensitive skin, avoid hot or cold showers, aggressive use of emollients including vanicream, cetaphil or cerave discussed with patient.    Risks of non-melanoma skin cancer discussed with patient   Return to clinic 6 months    PROCEDURE NOTE  L nasal tip basal cell carcinoma   MOHS:   Location    After PGACAC discussed with patient, decision for Mohs surgery was made. Indication for Mohs was Location. Patient confirmed the site with Dr. Quiroz.  After anesthesia with LEC, the tumor was excised using standard Mohs technique in 2 stages(s).  CLEAR MARGINS OBTAINED and Final defect size was 1.1 cm.     REPAIR WITH NASAL BILOBE: Because of the size and full-thickness nature of the defect, and to maintain form and function of the nose, and to avoid distortion of the nearby nasal tip and ala, a bilobed transposition flap was planned. After LEC anesthesia and prep, the bilobe was carefully incised with a Burow's triangle oriented superiorly. The flap was raised and the wound edges were all undermined in the subcutaneous plane to the nasofacial junction. After hemostasis, the flap was transposed into the defect and sutured in a layered  fashion using Vicryl and Fast Absorbing sutures. Postoperative size was 4.1 x 3.6 cm.  EBL minimal; complications none; wound care routine.  The patient was discharged in good condition and will return in one week for wound evaluation.

## 2018-05-15 NOTE — LETTER
5/15/2018         RE: Nick Villalobos  [address] [place] [address]        Dear Colleague,    Thank you for referring your patient, Nick Villalobos, to the [facility]. Please see a copy of my visit note below.    Nick Villalobos , a 62 year old year old male patient, I was asked to see by Dr. Vines for basal cell carcinoma on left nasal tip.  Patient states this has been present for a while.  Patient reports the following symptoms:  bleeding .  Patient reports the following previous treatments none.  Patient reports the following modifying factors none.  Associated symptoms: none.  Patient has no other skin complaints today.  Remainder of the HPI, Meds, PMH, Allergies, FH, and SH was reviewed in chart.      Past Medical History:   Diagnosis Date     Basal cell carcinoma      Esophageal reflux      Family history of malignant neoplasm of prostate 5/10/2007    Father at 53       Past Surgical History:   Procedure Laterality Date     HC COLONOSCOPY THRU STOMA, DIAGNOSTIC  6/15/07    Hyperplastic Polyp.  Needs repeat in 5 years     SURGICAL HISTORY OF -       tendon repair in right hand        Family History   Problem Relation Age of Onset     Prostate Cancer Father      Cancer - colorectal Father      C.A.D. Father       at 67, presumed MI     Hypertension Father      CEREBROVASCULAR DISEASE Mother      at 64       Social History     Social History     Marital status:      Spouse name: Cheryl     Number of children: 3     Years of education: N/A     Occupational History     Not on file.     Social History Main Topics     Smoking status: Former Smoker     Years: 30.00     Quit date: 2001     Smokeless tobacco: Never Used     Alcohol use Yes      Comment: 2x per month     Drug use: No     Sexual activity: Yes     Partners: Female     Other Topics Concern     Blood Transfusions No     Caffeine Concern No     Sleep Concern No     Exercise Yes     Runs  30-40 miles a month     Seat Belt Yes     Parent/Sibling W/ Cabg, Mi Or Angioplasty Before 65f 55m? Yes     Social History Narrative    [place] [employer]       Outpatient Encounter Prescriptions as of 5/15/2018   Medication Sig Dispense Refill     [DISCONTINUED] diazepam (VALIUM) 5 MG tablet Take 1 tablet (5 mg) by mouth every 8 hours as needed for anxiety or sleep (muscle spasm) (Patient not taking: Reported on 4/26/2018) 30 tablet 0     [DISCONTINUED] oxyCODONE-acetaminophen (PERCOCET) 5-325 MG per tablet Take 1 tablet by mouth every 4 hours as needed for pain maximum 4 tablet(s) per day (Patient not taking: Reported on 4/26/2018) 30 tablet 0     No facility-administered encounter medications on file as of 5/15/2018.              Review Of Systems  Skin: As above  Eyes: negative  Ears/Nose/Throat: negative  Respiratory: No shortness of breath, dyspnea on exertion, cough, or hemoptysis  Cardiovascular: negative  Gastrointestinal: negative  Genitourinary: negative  Musculoskeletal: negative  Neurologic: negative  Psychiatric: negative  Hematologic/Lymphatic/Immunologic: negative  Endocrine: negative      O:   NAD, WDWN, Alert & Oriented, Mood & Affect wnl, Vitals stable   Here today alone   /69  Pulse 61  SpO2 97%   General appearance kiesha ii   Vitals stable   Alert, oriented and in no acute distress      Following lymph nodes palpated: Occipital, Cervical, Supraclavicular no lad   L nasal tip 5mm scaly papule    Stuck on papules and brown macules on trunk and ext    Red papule son trunk         The remainder of the full exam was unremarkable; the following areas were examined:  conjunctiva/lids, oral mucosa, neck, peripheral vascular system, abdomen, lymph nodes, digits/nails, eccrine and apocrine glands, scalp/hair, face, neck, chest, abdomen, buttocks, back, RUE, LUE, RLE, LLE       Eyes: Conjunctivae/lids:Normal     ENT: Lips, buccal mucosa, tongue: normal    MSK:Normal    Cardiovascular: peripheral edema  none    Pulm: Breathing Normal    Lymph Nodes: No Head and Neck Lymphadenopathy     Neuro/Psych: Orientation:Normal; Mood/Affect:Normal      A/P:  1. Basal cell carcinoma nose  2. Seborrheic keratosis, letnigo, angioma  Patient to follow up with Primary Care provider regarding elevated blood pressure.  BENIGN LESIONS DISCUSSED WITH PATIENT:  I discussed the specifics of tumor, prognosis, and genetics of benign lesions.  I explained that treatment of these lesions would be purely cosmetic and not medically neccessary.  I discussed with patient different removal options including excision, cautery and /or laser.      Nature and genetics of benign skin lesions dicussed with patient.  Signs and Symptoms of skin cancer discussed with patient.  Patient encouraged to perform monthly skin exams.  UV precautions reviewed with patient.  Patient to follow up with Primary Care provider regarding elevated blood pressure.    Skin care regimen reviewed with patient: Eliminate harsh soaps, i.e. Dial, zest, irsih spring; Mild soaps such as Cetaphil or Dove sensitive skin, avoid hot or cold showers, aggressive use of emollients including vanicream, cetaphil or cerave discussed with patient.    Risks of non-melanoma skin cancer discussed with patient   Return to clinic 6 months    PROCEDURE NOTE  L nasal tip basal cell carcinoma   MOHS:   Location    After PGACAC discussed with patient, decision for Mohs surgery was made. Indication for Mohs was Location. Patient confirmed the site with Dr. Quiroz.  After anesthesia with LEC, the tumor was excised using standard Mohs technique in 2 stages(s).  CLEAR MARGINS OBTAINED and Final defect size was 1.1 cm.     REPAIR WITH NASAL BILOBE: Because of the size and full-thickness nature of the defect, and to maintain form and function of the nose, and to avoid distortion of the nearby nasal tip and ala, a bilobed transposition flap was planned. After LEC anesthesia and prep, the bilobe was  carefully incised with a Burow's triangle oriented superiorly. The flap was raised and the wound edges were all undermined in the subcutaneous plane to the nasofacial junction. After hemostasis, the flap was transposed into the defect and sutured in a layered fashion using Vicryl and Fast Absorbing sutures. Postoperative size was 4.1 x 3.6 cm.  EBL minimal; complications none; wound care routine.  The patient was discharged in good condition and will return in one week for wound evaluation.              Again, thank you for allowing me to participate in the care of your patient.        Sincerely,        Alcides Quiroz MD

## 2018-05-15 NOTE — PATIENT INSTRUCTIONS
Sutured Wound Care     [facility]: [phone]    [facility]: [phone]    nose      ? No strenuous activity for 48 hours. Resume moderate activity in 48 hours. No heavy exercising until you are seen for follow up in one week.     ? Take Tylenol as needed for discomfort.                         ? Do not drink alcoholic beverages for 48 hours.     ? Keep the pressure bandage in place for 24 hours. If the bandage becomes blood tinged or loose, reinforce it with gauze and tape.        (Refer to the reverse side of this page for management of bleeding).    ? Remove pressure bandage in 24 hours     ? Leave the flat bandage in place until your follow up appointment.    ? Keep the bandage dry. Wash around it carefully.    ? If the tape becomes soiled or starts to come off, reinforce it with additional paper tape.    ? Do not smoke for 3 weeks; smoking is detrimental to wound healing.    ? It is normal to have swelling and bruising around the surgical site. The bruising will fade in approximately 10-14 days. Elevate the area to reduce swelling.    ? Numbness, itchiness and sensitivity to temperature changes can occur after surgery and may take up to 18 months to normalize.      POSSIBLE COMPLICATIONS    BLEEDIN. Leave the bandage in place.  2. Use tightly rolled up gauze or a cloth to apply direct pressure over the bandage for 20   minutes.  3. Reapply pressure for an additional 20 minutes if necessary  4. Call the office or go to the nearest emergency room if pressure fails to stop the bleeding.  5. Use additional gauze and tape to maintain pressure once the bleeding has stopped.        PAIN:    1. Post operative pain should slowly get better, never worse.  2. A severe increase in pain may indicate a problem. Call the office if this occurs.    In case of emergency phone:Dr Quiroz [phone]

## 2018-05-21 ENCOUNTER — ALLIED HEALTH/NURSE VISIT (OUTPATIENT)
Dept: DERMATOLOGY | Facility: CLINIC | Age: 63
End: 2018-05-21
Payer: COMMERCIAL

## 2018-05-21 DIAGNOSIS — Z48.01 ENCOUNTER FOR CHANGE OR REMOVAL OF SURGICAL WOUND DRESSING: Primary | ICD-10-CM

## 2018-05-21 PROCEDURE — 99207 ZZC NO CHARGE NURSE ONLY: CPT

## 2018-05-21 NOTE — PATIENT INSTRUCTIONS

## 2018-05-21 NOTE — MR AVS SNAPSHOT
After Visit Summary   5/21/2018    Nick Villalobos    MRN: [id number]           Patient Information     Date Of Birth          1955        Visit Information        Provider Department      5/21/2018 10:00 AM  DERM NURSE [facility]        Care Instructions    WOUND CARE INSTRUCTIONS  for  ONE WEEK AFTER SURGERY          1) Leave flat bandage on your skin for one week after today s bandage change.  2) In one week when you remove the bandage, you may resume your regular skin care routine, including washing with mild soap and water, applying moisturizer, make-up and sunscreen.    3) If there are any open or bleeding areas at the incision/graft site you should begin to cover the area with a bandage daily as follows:    1) Clean and dry the area with plain tap water using a Q-tip or sterile gauze pad.  2) Apply Polysporin or Bacitracin ointment to the open area.  3) Cover the wound with a band-aid or a sterile non-stick gauze pad and micropore paper tape.         SIGNS OF INFECTION  - If you notice any of these signs of infection, call your doctor right away: expanding redness around the wound.  - Yellow or greenish-colored pus or cloudy wound drainage.    - Red streaking spreading from the wound.  - Increased swelling, tenderness, or pain around the wound.   - Fever.    Please remember that yellow and clear drainage from a wound can be normal and related to normal wound healing.  Isolated drainage from a wound without a combination of the above features does not indicate infection.       *Once the bandages are removed, the scar will be red and firm (especially in the lip/chin area). This is normal and will fade in time. It might take 6-12 months for this to happen.     *Massaging the area will help the scar soften and fade quicker. Begin to massage the area one month after the bandages have been removed. To massage apply pressure directly and firmly over the scar with  the fingertips and move in a circular motion. Massage the area for a few minutes several times a day. Continue to massage the site for several months.    *Approximately 6-8 weeks after surgery it is not uncommon to see the formation of  tender pimple-like  bump along the scar. This is normal. As the scar continues to mature and the stitches underneath the skin begin to dissolve, this might occur. Do not pick or squeeze, this will resolve on it s own. Should one break open producing a small amount of drainage, apply Polysporin or Bacitracin ointment a few times a day until the wound is completely healed.    *Numbness in the surgical area is expected. It might take 12-18 months for the feeling to return to normal. During this time sensations of itchiness, tingling and occasional sharp pains might be noted. These feelings are normal and will subside once the nerves have completely healed.         IN CASE OF EMERGENCY: Dr Quiroz [phone]     If you were seen in [place] call: [phone]    If you were seen in [place] call: [phone]            Follow-ups after your visit        Your next 10 appointments already scheduled     Aug 16, 2018  8:30 AM CDT   Return Visit with Alcides Quiroz MD   [facility] ([facility]    [address] [place] [id number] [phone]            Nov 08, 2018  8:30 AM CST   Return Visit with Alcides Quiroz MD   [facility] ([address] [address] [phone]              Who to contact     If you have questions or need follow up information about today's clinic visit or your schedule please contact [facility] directly at [phone].  Normal or non-critical lab and imaging results will be communicated to you by MyChart, letter or phone within 4 business days after the clinic has  "received the results. If you do not hear from us within 7 days, please contact the clinic through menuvox or phone. If you have a critical or abnormal lab result, we will notify you by phone as soon as possible.  Submit refill requests through menuvox or call your pharmacy and they will forward the refill request to us. Please allow 3 business days for your refill to be completed.          Additional Information About Your Visit        menuvox Information     menuvox lets you send messages to your doctor, view your test results, renew your prescriptions, schedule appointments and more. To sign up, go to [email or web address] . Click on \"Log in\" on the left side of the screen, which will take you to the Welcome page. Then click on \"Sign up Now\" on the right side of the page.     You will be asked to enter the access code listed below, as well as some personal information. Please follow the directions to create your username and password.     Your access code is: [email or web address]  Expires: 2018 10:20 AM     Your access code will  in 90 days. If you need help or a new code, please call your [facility] clinic or [phone].        Care EveryWhere ID     This is your Care EveryWhere ID. This could be used by other organizations to access your [facility] medical records  [id number]         Blood Pressure from Last 3 Encounters:   05/15/18 127/69   18 140/90   18 110/70    Weight from Last 3 Encounters:   18 99.3 kg (219 lb)   17 93.4 kg (206 lb)   17 93 kg (205 lb)              Today, you had the following     No orders found for display       Primary Care Provider Office Phone # Fax #    [facility] [phone]       [address] [place] [address]        Equal Access to Services     MARTIN MARTINEZ AH: Sonam Barrientos, waaxda luqadaha, qaybta kaalmada abel cortés. So [facility] " [phone].    ATENCIÓN: Si habla robby, tiene a padilla disposición servicios gratuitos de asistencia lingüística. Sampson al [phone].    We comply with applicable federal civil rights laws and [place] laws. We do not discriminate on the basis of race, color, national origin, age, disability, sex, sexual orientation, or gender identity.            Thank you!     Thank you for choosing [facility]  for your care. Our goal is always to provide you with excellent care. Hearing back from our patients is one way we can continue to improve our services. Please take a few minutes to complete the written survey that you may receive in the mail after your visit with us. Thank you!             Your Updated Medication List - Protect others around you: Learn how to safely use, store and throw away your medicines at www.disposemymeds.org.      Notice  As of 5/21/2018 10:20 AM    You have not been prescribed any medications.

## 2018-05-22 NOTE — NURSING NOTE
Pt returned to clinic for post surgery 1 week follow up bandage change. Pt has no complaints, denies pain. Bandage removed, area cleansed with normal saline. Site is healing and wound edges approximating well. Reapplied new steri strips and paper tape.    Advised to watch for signs/sx of infection; spreading redness, drainage, odor, fever. Call or report promptly to clinic. Pt given written instructions and informed to rtc as needed. Patient verbalized understanding.     SYD Landrum-BSN  [facility]  [phone]

## 2018-06-19 NOTE — PROGRESS NOTES
"  SUBJECTIVE:                                                    Nick Villalobos is a 62 year old male who presents to clinic today for establishment of care, and for the following health issues:    Knee Pain    Onset: on and off     Description:   Location: both knees  Character: nighttime is worse    Intensity: moderate, severe    Progression of Symptoms: worse    Accompanying Signs & Symptoms:  Other symptoms: calves feel tight- history of restless leg syndrome, especially at night or when sitting for extended periods of time. Becomes anxious when unable to get up and move around.     History:   Previous similar pain: YES- pt had torn mimicus on right knee x 2 years ago- fracture of left knee x 1 year ago       Precipitating factors:   Trauma or overuse: YES    Alleviating factors:  Improved by: pt had been given valium and percocet to take as needed from previous    Therapies Tried and outcome: valium and percocet, magnesium pills    Bug bite- Bug bite by asian merry x 1 year ago on the back of the neck. Continues itching, feels like there may be something in there. Has tried rubbing alcohol and squeezing the area.     Problem list and histories reviewed & adjusted, as indicated.  Additional history: as documented    ROS:  Constitutional, HEENT, cardiovascular, pulmonary, GI, , musculoskeletal, neuro, skin, endocrine and psych systems are negative, except as otherwise noted.    This document serves as a record of the services and decisions personally performed and made by Ned Myers MD. It was created on her behalf by Jesica Jiang, a trained medical scribe. The creation of this document is based the provider's statements to the medical scribe.  Jessica Jiang 11:57 AM, June 21, 2018    OBJECTIVE:                                                    /80  Pulse 70  Temp 98.2  F (36.8  C) (Oral)  Ht 1.753 m (5' 9\")  Wt 96.2 kg (212 lb)  SpO2 98%  BMI 31.31 kg/m2 Body mass index is 31.31 " kg/(m^2).     GENERAL: healthy, alert, well nourished, well hydrated, no distress  HENT: ear canals- normal; TMs- normal; Nose- normal; Mouth- no ulcers, no lesions  NECK: no tenderness, no adenopathy, no asymmetry, no masses, no stiffness; thyroid- normal to palpation  RESP: lungs clear to auscultation - no rales, no rhonchi, no wheezes  CV: regular rates and rhythm, normal S1 S2, no S3 or S4 and no murmur, no click or rub -  ABDOMEN: soft, no tenderness, no  hepatosplenomegaly, no masses, normal bowel sounds  MS: Midlly positive right lateral Khanh's test, otherwise extremities- no gross deformities noted, no edema  SKIN: 5 mm non-fluctuant eschar on posterior neck at base of the skull, no erythema or edema. No foreign body noted, otherwise no suspicious lesions, no rashes     Diagnostic test results:  none      ASSESSMENT/PLAN:         Nick was seen today for insect bites and musculoskeletal problem.    Diagnoses and all orders for this visit:    Skin irritation  Reassurance given that area does not appear cancerous. Inflammatory cream prescribed and instructions given. Dandruff shampoo may also be applied to the area. Advised he stop picking/squeezing the wound, and stop use of rubbing alcohol. Return in 1-2 months if symptoms are not improving.   -     triamcinolone (KENALOG) 0.1 % cream; Apply sparingly to affected area three times daily for 14 days.    Restless legs syndrome  History of restless leg syndrome that worsens at night or when sitting for an extended period of time. Discussed treatments for pain including stretches, exercises, use of heat, and medications. Medications prescribed and instructions given for use.  -     Ferritin; Future  -     CBC with platelets; Future  -     pramipexole (MIRAPEX) 0.25 MG tablet; Take 0.5-2 tablets (0.125-0.5 mg) by mouth At Bedtime -Take 2 hours prior to bedtime;  Start with 1/2 tab and increase by 1/2 tab  every 3-4 days until improved; max 0.5  "mg/day    Adenomatous polyp of colon, unspecified part of colon  Noted a number of benign polyps at colonoscopy in the past. Has not had a recent colonoscopy.     Screen for colon cancer- schedule colonoscopy  -     GASTROENTEROLOGY ADULT REF PROCEDURE ONLY Vanessa Anderson [phone]; [facility]    Screening for prostate cancer  -     Prostate spec antigen screen; Future    Need for prophylactic vaccination with tetanus-diphtheria (TD)  - will address at his next appointment.     Screening for diabetes mellitus  -     Basic metabolic panel; Future    Screening for lipid disorders  -     Lipid panel reflex to direct LDL Fasting; Future    Risks, benefits and alternatives of treatments discussed. Plan agreed on.      Followup: Return in about 1 year (around 6/21/2019) for Wellness exam and fasting labs, Medication Recheck in 4-6 months.    See patient instructions.     Tobacco Cessation:   reports that he quit smoking about 16 years ago. He quit after 30.00 years of use. He has never used smokeless tobacco.     BMI:   Estimated body mass index is 31.31 kg/(m^2) as calculated from the following:    Height as of this encounter: 1.753 m (5' 9\").    Weight as of this encounter: 96.2 kg (212 lb).   Weight management plan: Discussed healthy diet and exercise guidelines and patient will follow up in 12 months in clinic to re-evaluate.     The information in this document, created by the medical scribe for me, accurately reflects the services I personally performed and the decisions made by me. I have reviewed and approved this document for accuracy prior to leaving the patient care area.  June 21, 2018 12:28 PM            Chemo Myers MD   Pager: [phone]    "

## 2018-06-21 ENCOUNTER — OFFICE VISIT (OUTPATIENT)
Dept: FAMILY MEDICINE | Facility: CLINIC | Age: 63
End: 2018-06-21
Payer: COMMERCIAL

## 2018-06-21 VITALS
OXYGEN SATURATION: 98 % | DIASTOLIC BLOOD PRESSURE: 80 MMHG | TEMPERATURE: 98.2 F | BODY MASS INDEX: 31.4 KG/M2 | HEIGHT: 69 IN | WEIGHT: 212 LBS | SYSTOLIC BLOOD PRESSURE: 120 MMHG | HEART RATE: 70 BPM

## 2018-06-21 DIAGNOSIS — Z12.11 SCREEN FOR COLON CANCER: ICD-10-CM

## 2018-06-21 DIAGNOSIS — Z13.1 SCREENING FOR DIABETES MELLITUS: ICD-10-CM

## 2018-06-21 DIAGNOSIS — Z12.5 SCREENING FOR PROSTATE CANCER: ICD-10-CM

## 2018-06-21 DIAGNOSIS — Z13.220 SCREENING FOR LIPID DISORDERS: ICD-10-CM

## 2018-06-21 DIAGNOSIS — D12.6 ADENOMATOUS POLYP OF COLON, UNSPECIFIED PART OF COLON: Primary | ICD-10-CM

## 2018-06-21 DIAGNOSIS — R23.8 SKIN IRRITATION: ICD-10-CM

## 2018-06-21 DIAGNOSIS — G25.81 RESTLESS LEGS SYNDROME: ICD-10-CM

## 2018-06-21 PROCEDURE — 99214 OFFICE O/P EST MOD 30 MIN: CPT | Performed by: FAMILY MEDICINE

## 2018-06-21 RX ORDER — PRAMIPEXOLE DIHYDROCHLORIDE 0.25 MG/1
.125-.5 TABLET ORAL AT BEDTIME
Qty: 90 TABLET | Refills: 1 | Status: SHIPPED | OUTPATIENT
Start: 2018-06-21 | End: 2018-10-25 | Stop reason: SINTOL

## 2018-06-21 RX ORDER — DIAZEPAM 5 MG
5 TABLET ORAL PRN
Refills: 0 | COMMUNITY
Start: 2017-10-25 | End: 2018-06-21

## 2018-06-21 RX ORDER — OXYCODONE AND ACETAMINOPHEN 5; 325 MG/1; MG/1
TABLET ORAL
Refills: 0 | COMMUNITY
Start: 2017-09-19 | End: 2018-06-21

## 2018-06-21 RX ORDER — TRIAMCINOLONE ACETONIDE 1 MG/G
CREAM TOPICAL
Qty: 15 G | Refills: 0 | Status: SHIPPED | OUTPATIENT
Start: 2018-06-21 | End: 2018-10-25

## 2018-06-21 NOTE — MR AVS SNAPSHOT
After Visit Summary   6/21/2018    Nick Villalobos    MRN: [id number]           Patient Information     Date Of Birth          1955        Visit Information        Provider Department      6/21/2018 11:20 AM Ned Myers MD [facility]        Today's Diagnoses     Adenomatous polyp of colon, unspecified part of colon    -  1    Restless legs syndrome        Screen for colon cancer        Screening for prostate cancer        Need for prophylactic vaccination with tetanus-diphtheria (TD)        Screening for diabetes mellitus        Screening for lipid disorders          Care Instructions      Understanding Restless Legs Syndrome    Are you ever annoyed by a creeping or itching feeling in your legs? Do you often feel an urge to move your legs while sitting or lying in bed? This can keep you from falling asleep at night. You may then feel tired during the day. If you have these problems, talk to your healthcare provider. He or she can suggest a treatment plan and help you find ways to sleep better.  Restless legs syndrome (RLS)  RLS is a creeping, crawly, or jumpy feeling in the legs with an urge to move them. Symptoms of RLS often occur during periods of inactivity, such as when you sit or lie down at night. This discomfort can keep you from falling asleep. RLS is more common in older people and tends to run in families. Overuse of caffeine or alcohol may make symptoms worse. Iron deficiency, diabetes, or kidney problems can contribute to RLS.  Periodic limb movement syndrome (PLMS)  PLMS is sudden, repetitive leg jerking during sleep. The person you sleep with is often the one who notices it. Your legs may jerk many times during the night. You and your partner may both have trouble sleeping and feel tired in the morning. PLMS shouldn t be confused with the normal leg or body twitching many people have when first falling asleep.  Treating these problems  If these problems  are causing disrupted sleep and daytime symptoms, treatment may be needed. Possible treatments may include:    Avoiding medicines like antidepressants, antinausea medicine, and other medicines that block dopamine    Iron supplements    Prescribed medicines including pramipexole, ropinirole, ritigotine, pregbalin, cabergoline, levodopa, and clonidine    Lifestyle changes, such as regular exercise, controlling caffeine intake, alcohol, and smoking  Date Last Reviewed: 9/1/2017 2000-2017 [employer]. [address] [place] [address]. All rights reserved. This information is not intended as a substitute for professional medical care. Always follow your healthcare professional's instructions.        Restless Legs Syndrome: What You Can Do    Symptoms of restless leg syndrome (RLS) can be treated. Together, you and your healthcare provider can work on your treatment plan. If needed, medicines may be prescribed. Also learn what you can do to ease your discomfort. Good sleep habits and a healthy lifestyle will help you rest better at night and have more energy during the day.  Working with your healthcare provider  RLS may occur on its own and may be passed on in families. It is sometimes linked to other medical problems. Low iron may cause some RLS symptoms. Your healthcare provider may order a lab test to check your iron level. Other medical problems associated with RLS are kidney disease, diabetes, Parkinson disease, and multiple sclerosis. Your doctor may prescribe medicines to reduce your symptoms and help you sleep better.  Tips for temporary relief  To reduce your discomfort, try the following:    Walking or stretching    Rubbing your legs    Having a massage    Taking a hot or cold bath    Doing activities that make muscles in your hands or legs work    Relaxing with yoga or meditation  Good sleep habits  Even though you have RLS, you can still have restful sleep. Try these good sleeping  habits:    Keep a regular sleep schedule. Go to bed and get up at the same time each day.    Avoid or limit naps.    Make sure the bedroom is quiet, dark, and not too hot or too cold.    Use your bed only for sleep and sex.  Healthy lifestyle  Your lifestyle affects your health and your sleep. Here are some healthy habits:    Eat a balanced diet. To get enough vitamins and minerals, you may also need to take supplements.    Manage stress and learn ways to relax. Deep breathing techniques and visualization can help to relax your muscles and calm your mind.    Exercise regularly. It can help reduce stress. Also, you will have more energy during the day and be more tired at bedtime. Afternoon exercise is best. Nighttime exercise may affect how well you sleep.  Date Last Reviewed: 9/1/2017 2000-2017 [employer]. [address] [place] [address]. All rights reserved. This information is not intended as a substitute for professional medical care. Always follow your healthcare professional's instructions.                Follow-ups after your visit        Additional Services     GASTROENTEROLOGY ADULT REF PROCEDURE ONLY Vanessa Anderson [phone]; [facility]       Last Lab Result: Creatinine (mg/dL)       Date                     Value                 09/01/2016               0.95             ----------  Body mass index is 31.31 kg/(m^2).      Patient will be contacted to schedule procedure.     Please be aware that coverage of these services is subject to the terms and limitations of your health insurance plan.  Call member services at your health plan with any benefit or coverage questions.  Any procedures must be performed at a [facility] facility OR coordinated by your clinic's referral office.    Please bring the following with you to your appointment:    (1) Any X-Rays, CTs or MRIs which have been performed.  Contact the facility where they were done to arrange for   prior to your scheduled appointment.    (2) List of current medications   (3) This referral request   (4) Any documents/labs given to you for this referral                  Follow-up notes from your care team     Return in about 1 year (around 6/21/2019) for Wellness exam and fasting labs.      Your next 10 appointments already scheduled     Aug 16, 2018  8:30 AM CDT   [facility]   [facility]    [address] [address]   [phone]            Nov 08, 2018  8:30 AM CST   Return Visit with Alcides Quiroz MD   [facility]    [address] [place] [id number] [phone]              Future tests that were ordered for you today     Open Future Orders        Priority Expected Expires Ordered    CBC with platelets Routine 7/21/2018 6/21/2019 6/21/2018    Ferritin Routine 7/21/2018 6/21/2019 6/21/2018    Basic metabolic panel Routine 7/21/2018 7/21/2018 6/21/2018    Lipid panel reflex to direct LDL Fasting Routine 7/21/2018 7/21/2018 6/21/2018    Prostate spec antigen screen Routine 7/21/2018 7/21/2018 6/21/2018            Who to contact     If you have questions or need follow up information about today's clinic visit or your schedule please contact [facility] directly at [phone].  Normal or non-critical lab and imaging results will be communicated to you by MyChart, letter or phone within 4 business days after the clinic has received the results. If you do not hear from us within 7 days, please contact the clinic through MyChart or phone. If you have a critical or abnormal lab result, we will notify you by phone as soon as possible.  Submit refill requests through [employer] or call your pharmacy and they will forward the refill request to us. Please allow 3 business days for your refill to be  "completed.          Additional Information About Your Visit        Care EveryWhere ID     This is your Care EveryWhere ID. This could be used by other organizations to access your [facility] medical records  [id number]        Your Vitals Were     Pulse Temperature Height Pulse Oximetry BMI (Body Mass Index)       70 98.2  F (36.8  C) (Oral) 5' 9\" (1.753 m) 98% 31.31 kg/m2        Blood Pressure from Last 3 Encounters:   06/21/18 120/80   05/15/18 127/69   04/26/18 140/90    Weight from Last 3 Encounters:   06/21/18 212 lb (96.2 kg)   02/16/18 219 lb (99.3 kg)   08/04/17 206 lb (93.4 kg)              We Performed the Following          ADMIN VACCINE, FIRST     GASTROENTEROLOGY ADULT REF PROCEDURE ONLY Vanessa Anderson [phone]; [facility]     TD (ADULT, 7+) PRESERVE FREE          Today's Medication Changes          These changes are accurate as of 6/21/18 12:19 PM.  If you have any questions, ask your nurse or doctor.               Start taking these medicines.        Dose/Directions    pramipexole 0.25 MG tablet   Commonly known as:  MIRAPEX   Used for:  Restless legs syndrome   Started by:  Ned Myers MD        Dose:  0.125-0.5 mg   Take 0.5-2 tablets (0.125-0.5 mg) by mouth At Bedtime -Take 2 hours prior to bedtime;  Start with 1/2 tab and increase by 1/2 tab  every 3-4 days until improved; max 0.5 mg/day   Quantity:  90 tablet   Refills:  1            Where to get your medicines      Some of these will need a paper prescription and others can be bought over the counter.  Ask your nurse if you have questions.     Bring a paper prescription for each of these medications     pramipexole 0.25 MG tablet                Primary Care Provider Office Phone # Fax #    Ned Myers [phone]       [address] [place] [address]        Equal Access to Services     MARTIN MARTINEZ AH: Sonam Barrientos, almas owens, qaybabel kapoor " percy churchramirosolitario vitalmychalchikis ah. So [facility] [phone].    ATENCIÓN: Si habla robby, tiene a padilla disposición servicios gratuitos de asistencia lingüística. Sampson al [phone].    We comply with applicable federal civil rights laws and [place] laws. We do not discriminate on the basis of race, color, national origin, age, disability, sex, sexual orientation, or gender identity.            Thank you!     Thank you for choosing [facility]  for your care. Our goal is always to provide you with excellent care. Hearing back from our patients is one way we can continue to improve our services. Please take a few minutes to complete the written survey that you may receive in the mail after your visit with us. Thank you!             Your Updated Medication List - Protect others around you: Learn how to safely use, store and throw away your medicines at www.disposemymeds.org.          This list is accurate as of 6/21/18 12:19 PM.  Always use your most recent med list.                   Brand Name Dispense Instructions for use Diagnosis    pramipexole 0.25 MG tablet    MIRAPEX    90 tablet    Take 0.5-2 tablets (0.125-0.5 mg) by mouth At Bedtime -Take 2 hours prior to bedtime;  Start with 1/2 tab and increase by 1/2 tab  every 3-4 days until improved; max 0.5 mg/day    Restless legs syndrome

## 2018-06-21 NOTE — PATIENT INSTRUCTIONS
Understanding Restless Legs Syndrome    Are you ever annoyed by a creeping or itching feeling in your legs? Do you often feel an urge to move your legs while sitting or lying in bed? This can keep you from falling asleep at night. You may then feel tired during the day. If you have these problems, talk to your healthcare provider. He or she can suggest a treatment plan and help you find ways to sleep better.  Restless legs syndrome (RLS)  RLS is a creeping, crawly, or jumpy feeling in the legs with an urge to move them. Symptoms of RLS often occur during periods of inactivity, such as when you sit or lie down at night. This discomfort can keep you from falling asleep. RLS is more common in older people and tends to run in families. Overuse of caffeine or alcohol may make symptoms worse. Iron deficiency, diabetes, or kidney problems can contribute to RLS.  Periodic limb movement syndrome (PLMS)  PLMS is sudden, repetitive leg jerking during sleep. The person you sleep with is often the one who notices it. Your legs may jerk many times during the night. You and your partner may both have trouble sleeping and feel tired in the morning. PLMS shouldn t be confused with the normal leg or body twitching many people have when first falling asleep.  Treating these problems  If these problems are causing disrupted sleep and daytime symptoms, treatment may be needed. Possible treatments may include:    Avoiding medicines like antidepressants, antinausea medicine, and other medicines that block dopamine    Iron supplements    Prescribed medicines including pramipexole, ropinirole, ritigotine, pregbalin, cabergoline, levodopa, and clonidine    Lifestyle changes, such as regular exercise, controlling caffeine intake, alcohol, and smoking  Date Last Reviewed: 9/1/2017 2000-2017 [employer]. [address] [place] [address], [place] [address]. All rights reserved. This information is not intended as a substitute for  professional medical care. Always follow your healthcare professional's instructions.        Restless Legs Syndrome: What You Can Do    Symptoms of restless leg syndrome (RLS) can be treated. Together, you and your healthcare provider can work on your treatment plan. If needed, medicines may be prescribed. Also learn what you can do to ease your discomfort. Good sleep habits and a healthy lifestyle will help you rest better at night and have more energy during the day.  Working with your healthcare provider  RLS may occur on its own and may be passed on in families. It is sometimes linked to other medical problems. Low iron may cause some RLS symptoms. Your healthcare provider may order a lab test to check your iron level. Other medical problems associated with RLS are kidney disease, diabetes, Parkinson disease, and multiple sclerosis. Your doctor may prescribe medicines to reduce your symptoms and help you sleep better.  Tips for temporary relief  To reduce your discomfort, try the following:    Walking or stretching    Rubbing your legs    Having a massage    Taking a hot or cold bath    Doing activities that make muscles in your hands or legs work    Relaxing with yoga or meditation  Good sleep habits  Even though you have RLS, you can still have restful sleep. Try these good sleeping habits:    Keep a regular sleep schedule. Go to bed and get up at the same time each day.    Avoid or limit naps.    Make sure the bedroom is quiet, dark, and not too hot or too cold.    Use your bed only for sleep and sex.  Healthy lifestyle  Your lifestyle affects your health and your sleep. Here are some healthy habits:    Eat a balanced diet. To get enough vitamins and minerals, you may also need to take supplements.    Manage stress and learn ways to relax. Deep breathing techniques and visualization can help to relax your muscles and calm your mind.    Exercise regularly. It can help reduce stress. Also, you will have more  energy during the day and be more tired at bedtime. Afternoon exercise is best. Nighttime exercise may affect how well you sleep.  Date Last Reviewed: 9/1/2017 2000-2017 [employer]. [address] [place] [address], [place] [address]. All rights reserved. This information is not intended as a substitute for professional medical care. Always follow your healthcare professional's instructions.

## 2018-06-22 ENCOUNTER — HEALTH MAINTENANCE LETTER (OUTPATIENT)
Age: 63
End: 2018-06-22

## 2018-06-22 ENCOUNTER — ALLIED HEALTH/NURSE VISIT (OUTPATIENT)
Dept: NURSING | Facility: CLINIC | Age: 63
End: 2018-06-22
Payer: COMMERCIAL

## 2018-06-22 DIAGNOSIS — Z13.1 SCREENING FOR DIABETES MELLITUS: ICD-10-CM

## 2018-06-22 DIAGNOSIS — Z13.220 SCREENING FOR LIPID DISORDERS: ICD-10-CM

## 2018-06-22 DIAGNOSIS — Z12.5 SCREENING FOR PROSTATE CANCER: ICD-10-CM

## 2018-06-22 DIAGNOSIS — G25.81 RESTLESS LEGS SYNDROME: ICD-10-CM

## 2018-06-22 DIAGNOSIS — Z23 ENCOUNTER FOR IMMUNIZATION: Primary | ICD-10-CM

## 2018-06-22 LAB
ERYTHROCYTE [DISTWIDTH] IN BLOOD BY AUTOMATED COUNT: 12.6 % (ref 10–15)
HCT VFR BLD AUTO: 45.4 % (ref 40–53)
HGB BLD-MCNC: 15.1 G/DL (ref 13.3–17.7)
MCH RBC QN AUTO: 30 PG (ref 26.5–33)
MCHC RBC AUTO-ENTMCNC: 33.3 G/DL (ref 31.5–36.5)
MCV RBC AUTO: 90 FL (ref 78–100)
PLATELET # BLD AUTO: 212 10E9/L (ref 150–450)
RBC # BLD AUTO: 5.04 10E12/L (ref 4.4–5.9)
WBC # BLD AUTO: 5.9 10E9/L (ref 4–11)

## 2018-06-22 PROCEDURE — 85027 COMPLETE CBC AUTOMATED: CPT | Performed by: FAMILY MEDICINE

## 2018-06-22 PROCEDURE — 80048 BASIC METABOLIC PNL TOTAL CA: CPT | Performed by: FAMILY MEDICINE

## 2018-06-22 PROCEDURE — 80061 LIPID PANEL: CPT | Performed by: FAMILY MEDICINE

## 2018-06-22 PROCEDURE — 90471 IMMUNIZATION ADMIN: CPT

## 2018-06-22 PROCEDURE — G0103 PSA SCREENING: HCPCS | Performed by: FAMILY MEDICINE

## 2018-06-22 PROCEDURE — 36415 COLL VENOUS BLD VENIPUNCTURE: CPT | Performed by: FAMILY MEDICINE

## 2018-06-22 PROCEDURE — 82728 ASSAY OF FERRITIN: CPT | Performed by: FAMILY MEDICINE

## 2018-06-22 PROCEDURE — 90714 TD VACC NO PRESV 7 YRS+ IM: CPT

## 2018-06-22 NOTE — LETTER
[facility]  [address] [place] [id number]                  [phone]   June 25, 2018    Nick Villalobos  [address] [facility] [address]      Dear Nick,    Here is a summary of your recent test results:    -Kidney function is normal (Cr, GFR), Sodium is normal, Potassium is normal, Calcium is normal, Glucose is normal (diabetes screening test).   -LDL(bad) cholesterol level is elevated,  A diet high in fat and simple carbohydrates, genetics and being overweight can contribute to this. ADVISE: Exercise, a low fat, low carbohydrate diet and weight control are helpful to improve this.  Rechecking your fasting cholesterol panel in 6 months is recommended (Lipid w/ LDL reflex, DX:hyperlipidemia)   -PSA (prostate specific antigen) test is normal.  This indicates a low likelihood of prostate cancer.  ADVISE: yearly recheck.   -Normal red blood cell (hgb) levels, normal white blood cell count and normal platelet levels.   -Ferritin (iron) level is normal.     For additional lab test information, labtestsonline.org is an excellent reference.     Your test results are enclosed.      Please contact me if you have any questions.    In addition, here is a list of due or overdue Health Maintenance reminders.    Health Maintenance Due   Topic Date Due     Wellness Visit with your Primary Provider - yearly  05/17/2014     Discuss Advance Directive Planning  08/25/2016     Colonoscopy - every 3 years  08/08/2017             Please call us at [phone] (or use [employer]) to address the above recommendations.            Thank you very much for trusting [facility]..     Healthy regards,          Chemo Myers M.D.        Results for orders placed or performed in visit on 06/22/18   Basic metabolic panel   Result Value Ref Range    Sodium 140 133 - 144 mmol/L    Potassium 3.9 3.4 - 5.3 mmol/L    Chloride 107 94 - 109 mmol/L    Carbon Dioxide 26  20 - 32 mmol/L    Anion Gap 7 3 - 14 mmol/L    Glucose 72 70 - 99 mg/dL    Urea Nitrogen 23 7 - 30 mg/dL    Creatinine 0.94 0.66 - 1.25 mg/dL    GFR Estimate 82 >60 mL/min/1.7m2    GFR Estimate If Black >90 >60 mL/min/1.7m2    Calcium 8.9 8.5 - 10.1 mg/dL   Lipid panel reflex to direct LDL Fasting   Result Value Ref Range    Cholesterol 220 (H) <200 mg/dL    Triglycerides 40 <150 mg/dL    HDL Cholesterol 62 >39 mg/dL    LDL Cholesterol Calculated 150 (H) <100 mg/dL    Non HDL Cholesterol 158 (H) <130 mg/dL   Prostate spec antigen screen   Result Value Ref Range    PSA 1.30 0 - 4 ug/L   Ferritin   Result Value Ref Range    Ferritin 104 26 - 388 ng/mL   CBC with platelets   Result Value Ref Range    WBC 5.9 4.0 - 11.0 10e9/L    RBC Count 5.04 4.4 - 5.9 10e12/L    Hemoglobin 15.1 13.3 - 17.7 g/dL    Hematocrit 45.4 40.0 - 53.0 %    MCV 90 78 - 100 fl    MCH 30.0 26.5 - 33.0 pg    MCHC 33.3 31.5 - 36.5 g/dL    RDW 12.6 10.0 - 15.0 %    Platelet Count 212 150 - 450 10e9/L

## 2018-06-23 LAB
ANION GAP SERPL CALCULATED.3IONS-SCNC: 7 MMOL/L (ref 3–14)
BUN SERPL-MCNC: 23 MG/DL (ref 7–30)
CALCIUM SERPL-MCNC: 8.9 MG/DL (ref 8.5–10.1)
CHLORIDE SERPL-SCNC: 107 MMOL/L (ref 94–109)
CHOLEST SERPL-MCNC: 220 MG/DL
CO2 SERPL-SCNC: 26 MMOL/L (ref 20–32)
CREAT SERPL-MCNC: 0.94 MG/DL (ref 0.66–1.25)
FERRITIN SERPL-MCNC: 104 NG/ML (ref 26–388)
GFR SERPL CREATININE-BSD FRML MDRD: 82 ML/MIN/1.7M2
GLUCOSE SERPL-MCNC: 72 MG/DL (ref 70–99)
HDLC SERPL-MCNC: 62 MG/DL
LDLC SERPL CALC-MCNC: 150 MG/DL
NONHDLC SERPL-MCNC: 158 MG/DL
POTASSIUM SERPL-SCNC: 3.9 MMOL/L (ref 3.4–5.3)
PSA SERPL-ACNC: 1.3 UG/L (ref 0–4)
SODIUM SERPL-SCNC: 140 MMOL/L (ref 133–144)
TRIGL SERPL-MCNC: 40 MG/DL

## 2018-06-25 NOTE — PROGRESS NOTES
Note to Staff: please send a result letter    -Kidney function is normal (Cr, GFR), Sodium is normal, Potassium is normal, Calcium is normal, Glucose is normal (diabetes screening test).   -LDL(bad) cholesterol level is elevated,  A diet high in fat and simple carbohydrates, genetics and being overweight can contribute to this. ADVISE: Exercise, a low fat, low carbohydrate diet and weight control are helpful to improve this.  Rechecking your fasting cholesterol panel in 6 months is recommended (Lipid w/ LDL reflex, DX:hyperlipidemia)  -PSA (prostate specific antigen) test is normal.  This indicates a low likelihood of prostate cancer.  ADVISE: yearly recheck.   -Normal red blood cell (hgb) levels, normal white blood cell count and normal platelet levels.  -Ferritin (iron) level is normal.     For additional lab test information, labtestsonline.org is an excellent reference.

## 2018-08-16 ENCOUNTER — OFFICE VISIT (OUTPATIENT)
Dept: DERMATOLOGY | Facility: CLINIC | Age: 63
End: 2018-08-16
Payer: COMMERCIAL

## 2018-08-16 VITALS — DIASTOLIC BLOOD PRESSURE: 81 MMHG | SYSTOLIC BLOOD PRESSURE: 126 MMHG | OXYGEN SATURATION: 99 % | HEART RATE: 69 BPM

## 2018-08-16 DIAGNOSIS — L72.0 EPIDERMAL CYST: ICD-10-CM

## 2018-08-16 DIAGNOSIS — Z85.828 HISTORY OF SKIN CANCER: Primary | ICD-10-CM

## 2018-08-16 PROCEDURE — 99213 OFFICE O/P EST LOW 20 MIN: CPT | Performed by: DERMATOLOGY

## 2018-08-16 NOTE — LETTER
2018         RE: Nick Villalobos  [address] [facility] [address]        Dear Colleague,    Thank you for referring your patient, Nick Villalobos, to the [facility]. Please see a copy of my visit note below.    Nick Villalobos is a 62 year old year old male patient here today for wound check nose, healed well, no issues, he notes bump on right foot today.   .  Patient states this has been present for a while.  Patient reports the following symptoms:  Tender.  Patient reports the following previous treatments none.  Patient reports the following modifying factors none.  Associated symptoms: none.  Patient has no other skin complaints today.  Remainder of the HPI, Meds, PMH, Allergies, FH, and SH was reviewed in chart.      Past Medical History:   Diagnosis Date     Basal cell carcinoma      Esophageal reflux      Family history of malignant neoplasm of prostate 5/10/2007    Father at 53       Past Surgical History:   Procedure Laterality Date     HC COLONOSCOPY THRU STOMA, DIAGNOSTIC  6/15/07    Hyperplastic Polyp.  Needs repeat in 5 years     SURGICAL HISTORY OF -       tendon repair in right hand        Family History   Problem Relation Age of Onset     Prostate Cancer Father      Cancer - colorectal Father      C.A.D. Father       at 67, presumed MI     Hypertension Father      Cerebrovascular Disease Mother      at 64       Social History     Social History     Marital status:      Spouse name: Cheryl     Number of children: 3     Years of education: N/A     Occupational History     Not on file.     Social History Main Topics     Smoking status: Former Smoker     Years: 30.00     Quit date: 2001     Smokeless tobacco: Never Used     Alcohol use Yes      Comment: 2x per month     Drug use: No     Sexual activity: Yes     Partners: Female     Other Topics Concern     Blood Transfusions No     Caffeine Concern No     Sleep Concern No      Exercise Yes     Runs 30-40 miles a month     Seat Belt Yes     Parent/Sibling W/ Cabg, Mi Or Angioplasty Before 65f 55m? Yes     Social History Narrative    [employer] [place] [employer]       Outpatient Encounter Prescriptions as of 8/16/2018   Medication Sig Dispense Refill     pramipexole (MIRAPEX) 0.25 MG tablet Take 0.5-2 tablets (0.125-0.5 mg) by mouth At Bedtime -Take 2 hours prior to bedtime;  Start with 1/2 tab and increase by 1/2 tab  every 3-4 days until improved; max 0.5 mg/day 90 tablet 1     triamcinolone (KENALOG) 0.1 % cream Apply sparingly to affected area three times daily for 14 days. 15 g 0     No facility-administered encounter medications on file as of 8/16/2018.              Review Of Systems  Skin: As above  Eyes: negative  Ears/Nose/Throat: negative  Respiratory: No shortness of breath, dyspnea on exertion, cough, or hemoptysis  Cardiovascular: negative  Gastrointestinal: negative  Genitourinary: negative  Musculoskeletal: negative  Neurologic: negative  Psychiatric: negative  Hematologic/Lymphatic/Immunologic: negative  Endocrine: negative      O:   NAD, WDWN, Alert & Oriented, Mood & Affect wnl, Vitals stable   Here today alone   /81  Pulse 69  SpO2 99%   General appearance normal   Vitals stable   Alert, oriented and in no acute distress     R post foot firm ondule   Nose well helaed        The remainder of expanded problem focused exam was unremarkable; the following areas were examined:  scalp/hair, conjunctiva/lids, face, neck, lips, chest, digits/nails, RUE, LUE.      Eyes: Conjunctivae/lids:Normal     ENT: Lips, buccal mucosa, tongue: normal    MSK:Normal    Cardiovascular: peripheral edema none    Pulm: Breathing Normal    Lymph Nodes: No Head and Neck Lymphadenopathy     Neuro/Psych: Orientation:Normal; Mood/Affect:Normal      A/P:  1. Hx of non-melanoma skin cancer nose well healed  2. epidemral cyst  Schedule excision BENIGN LESIONS DISCUSSED WITH PATIENT:  I discussed the specifics of  tumor, prognosis, and genetics of benign lesions.  I explained that treatment of these lesions would be purely cosmetic and not medically neccessary.  I discussed with patient different removal options including excision, cautery and /or laser.      Nature and genetics of benign skin lesions dicussed with patient.  Signs and Symptoms of skin cancer discussed with patient.  ABCDEs of melanoma reviewed with patient.  Patient encouraged to perform monthly skin exams.  UV precautions reviewed with patient.  Patient to follow up with Primary Care provider regarding elevated blood pressure.  Skin care regimen reviewed with patient: Eliminate harsh soaps, i.e. Dial, zest, irsih spring; Mild soaps such as Cetaphil or Dove sensitive skin, avoid hot or cold showers, aggressive use of emollients including vanicream, cetaphil or cerave discussed with patient.    Risks of non-melanoma skin cancer discussed with patient   Return to clinic 6 months  Patient to follow up with Primary Care provider regarding elevated blood pressure.        Again, thank you for allowing me to participate in the care of your patient.        Sincerely,        Alcides Quiroz MD

## 2018-08-16 NOTE — PROGRESS NOTES
Nick Villalobos is a 62 year old year old male patient here today for wound check nose, healed well, no issues, he notes bump on right foot today.   .  Patient states this has been present for a while.  Patient reports the following symptoms:  Tender.  Patient reports the following previous treatments none.  Patient reports the following modifying factors none.  Associated symptoms: none.  Patient has no other skin complaints today.  Remainder of the HPI, Meds, PMH, Allergies, FH, and SH was reviewed in chart.      Past Medical History:   Diagnosis Date     Basal cell carcinoma      Esophageal reflux      Family history of malignant neoplasm of prostate 5/10/2007    Father at 53       Past Surgical History:   Procedure Laterality Date     HC COLONOSCOPY THRU STOMA, DIAGNOSTIC  6/15/07    Hyperplastic Polyp.  Needs repeat in 5 years     SURGICAL HISTORY OF -       tendon repair in right hand        Family History   Problem Relation Age of Onset     Prostate Cancer Father      Cancer - colorectal Father      C.A.D. Father       at 67, presumed MI     Hypertension Father      Cerebrovascular Disease Mother      at 64       Social History     Social History     Marital status:      Spouse name: Cheryl     Number of children: 3     Years of education: N/A     Occupational History     Not on file.     Social History Main Topics     Smoking status: Former Smoker     Years: 30.00     Quit date: 2001     Smokeless tobacco: Never Used     Alcohol use Yes      Comment: 2x per month     Drug use: No     Sexual activity: Yes     Partners: Female     Other Topics Concern     Blood Transfusions No     Caffeine Concern No     Sleep Concern No     Exercise Yes     Runs 30-40 miles a month     Seat Belt Yes     Parent/Sibling W/ Cabg, Mi Or Angioplasty Before 65f 55m? Yes     Social History Narrative    [employer]       Outpatient Encounter Prescriptions as of 2018   Medication Sig Dispense Refill      pramipexole (MIRAPEX) 0.25 MG tablet Take 0.5-2 tablets (0.125-0.5 mg) by mouth At Bedtime -Take 2 hours prior to bedtime;  Start with 1/2 tab and increase by 1/2 tab  every 3-4 days until improved; max 0.5 mg/day 90 tablet 1     triamcinolone (KENALOG) 0.1 % cream Apply sparingly to affected area three times daily for 14 days. 15 g 0     No facility-administered encounter medications on file as of 8/16/2018.              Review Of Systems  Skin: As above  Eyes: negative  Ears/Nose/Throat: negative  Respiratory: No shortness of breath, dyspnea on exertion, cough, or hemoptysis  Cardiovascular: negative  Gastrointestinal: negative  Genitourinary: negative  Musculoskeletal: negative  Neurologic: negative  Psychiatric: negative  Hematologic/Lymphatic/Immunologic: negative  Endocrine: negative      O:   NAD, WDWN, Alert & Oriented, Mood & Affect wnl, Vitals stable   Here today alone   /81  Pulse 69  SpO2 99%   General appearance normal   Vitals stable   Alert, oriented and in no acute distress     R post foot firm ondule   Nose well helaed        The remainder of expanded problem focused exam was unremarkable; the following areas were examined:  scalp/hair, conjunctiva/lids, face, neck, lips, chest, digits/nails, RUE, LUE.      Eyes: Conjunctivae/lids:Normal     ENT: Lips, buccal mucosa, tongue: normal    MSK:Normal    Cardiovascular: peripheral edema none    Pulm: Breathing Normal    Lymph Nodes: No Head and Neck Lymphadenopathy     Neuro/Psych: Orientation:Normal; Mood/Affect:Normal      A/P:  1. Hx of non-melanoma skin cancer nose well healed  2. epidemral cyst  Schedule excision BENIGN LESIONS DISCUSSED WITH PATIENT:  I discussed the specifics of tumor, prognosis, and genetics of benign lesions.  I explained that treatment of these lesions would be purely cosmetic and not medically neccessary.  I discussed with patient different removal options including excision, cautery and /or laser.      Nature and  genetics of benign skin lesions dicussed with patient.  Signs and Symptoms of skin cancer discussed with patient.  ABCDEs of melanoma reviewed with patient.  Patient encouraged to perform monthly skin exams.  UV precautions reviewed with patient.  Patient to follow up with Primary Care provider regarding elevated blood pressure.  Skin care regimen reviewed with patient: Eliminate harsh soaps, i.e. Dial, zest, irsih spring; Mild soaps such as Cetaphil or Dove sensitive skin, avoid hot or cold showers, aggressive use of emollients including vanicream, cetaphil or cerave discussed with patient.    Risks of non-melanoma skin cancer discussed with patient   Return to clinic 6 months  Patient to follow up with Primary Care provider regarding elevated blood pressure.

## 2018-08-16 NOTE — MR AVS SNAPSHOT
After Visit Summary   8/16/2018    Nick Villalobos    MRN: [id number]           Patient Information     Date Of Birth          1955        Visit Information        Provider Department      8/16/2018 8:30 AM Alcides Quiroz MD [facility]        Today's Diagnoses     History of skin cancer    -  1    Epidermal cyst           Follow-ups after your visit        Your next 10 appointments already scheduled     Nov 08, 2018  8:30 AM CST   Return Visit with Alcides Quiroz MD   [facility] ([facility]    [address] [id number] [phone]              Who to contact     If you have questions or need follow up information about today's clinic visit or your schedule please contact [facility] directly at [phone].  Normal or non-critical lab and imaging results will be communicated to you by MyChart, letter or phone within 4 business days after the clinic has received the results. If you do not hear from us within 7 days, please contact the clinic through MyChart or phone. If you have a critical or abnormal lab result, we will notify you by phone as soon as possible.  Submit refill requests through [employer] or call your pharmacy and they will forward the refill request to us. Please allow 3 business days for your refill to be completed.          Additional Information About Your Visit        Care EveryWhere ID     This is your Care EveryWhere ID. This could be used by other organizations to access your [place] medical records  [id number]        Your Vitals Were     Pulse Pulse Oximetry                69 99%           Blood Pressure from Last 3 Encounters:   08/16/18 126/81   06/21/18 120/80   05/15/18 127/69    Weight from Last 3 Encounters:   06/21/18 96.2 kg (212 lb)   02/16/18 99.3 kg (219 lb)   08/04/17 93.4 kg (206 lb)              Today, you had the  following     No orders found for display       Primary Care Provider Office Phone # Fax #    Ned Myers [phone]       [address] [place] [address]        Equal Access to Services     MARTIN MARTINEZ : Hadii aleta ku girmao Sosarahali, waaxda luqadaha, qaybta kaalmada adeegyada, abel rahmann gaetano ellison laJulio Cusha cheema. So [facility] [phone].    ATENCIÓN: Si habla español, tiene a padilla disposición servicios gratuitos de asistencia lingüística. Llame al [phone].    We comply with applicable federal civil rights laws and [place] laws. We do not discriminate on the basis of race, color, national origin, age, disability, sex, sexual orientation, or gender identity.            Thank you!     Thank you for choosing [facility]  for your care. Our goal is always to provide you with excellent care. Hearing back from our patients is one way we can continue to improve our services. Please take a few minutes to complete the written survey that you may receive in the mail after your visit with us. Thank you!             Your Updated Medication List - Protect others around you: Learn how to safely use, store and throw away your medicines at www.disposemymeds.org.          This list is accurate as of 8/16/18  8:39 AM.  Always use your most recent med list.                   Brand Name Dispense Instructions for use Diagnosis    pramipexole 0.25 MG tablet    MIRAPEX    90 tablet    Take 0.5-2 tablets (0.125-0.5 mg) by mouth At Bedtime -Take 2 hours prior to bedtime;  Start with 1/2 tab and increase by 1/2 tab  every 3-4 days until improved; max 0.5 mg/day    Restless legs syndrome       triamcinolone 0.1 % cream    KENALOG    15 g    Apply sparingly to affected area three times daily for 14 days.    Skin irritation

## 2018-08-31 ENCOUNTER — TRANSFERRED RECORDS (OUTPATIENT)
Dept: HEALTH INFORMATION MANAGEMENT | Facility: CLINIC | Age: 63
End: 2018-08-31

## 2018-10-25 ENCOUNTER — OFFICE VISIT (OUTPATIENT)
Dept: FAMILY MEDICINE | Facility: CLINIC | Age: 63
End: 2018-10-25
Payer: COMMERCIAL

## 2018-10-25 VITALS
TEMPERATURE: 98.4 F | HEART RATE: 62 BPM | WEIGHT: 218 LBS | HEIGHT: 69 IN | BODY MASS INDEX: 32.29 KG/M2 | OXYGEN SATURATION: 96 % | SYSTOLIC BLOOD PRESSURE: 138 MMHG | DIASTOLIC BLOOD PRESSURE: 100 MMHG

## 2018-10-25 DIAGNOSIS — G25.81 RESTLESS LEGS SYNDROME: ICD-10-CM

## 2018-10-25 DIAGNOSIS — R10.9 FLANK PAIN: Primary | ICD-10-CM

## 2018-10-25 LAB
ALBUMIN UR-MCNC: NEGATIVE MG/DL
APPEARANCE UR: CLEAR
BASOPHILS # BLD AUTO: 0 10E9/L (ref 0–0.2)
BASOPHILS NFR BLD AUTO: 0.7 %
BILIRUB UR QL STRIP: NEGATIVE
COLOR UR AUTO: YELLOW
DIFFERENTIAL METHOD BLD: NORMAL
EOSINOPHIL # BLD AUTO: 0.1 10E9/L (ref 0–0.7)
EOSINOPHIL NFR BLD AUTO: 2.3 %
ERYTHROCYTE [DISTWIDTH] IN BLOOD BY AUTOMATED COUNT: 12.9 % (ref 10–15)
ERYTHROCYTE [SEDIMENTATION RATE] IN BLOOD BY WESTERGREN METHOD: 7 MM/H (ref 0–20)
GLUCOSE UR STRIP-MCNC: NEGATIVE MG/DL
HCT VFR BLD AUTO: 45.4 % (ref 40–53)
HGB BLD-MCNC: 15.3 G/DL (ref 13.3–17.7)
HGB UR QL STRIP: NEGATIVE
KETONES UR STRIP-MCNC: NEGATIVE MG/DL
LEUKOCYTE ESTERASE UR QL STRIP: NEGATIVE
LYMPHOCYTES # BLD AUTO: 1.9 10E9/L (ref 0.8–5.3)
LYMPHOCYTES NFR BLD AUTO: 33.3 %
MCH RBC QN AUTO: 29.9 PG (ref 26.5–33)
MCHC RBC AUTO-ENTMCNC: 33.7 G/DL (ref 31.5–36.5)
MCV RBC AUTO: 89 FL (ref 78–100)
MONOCYTES # BLD AUTO: 0.8 10E9/L (ref 0–1.3)
MONOCYTES NFR BLD AUTO: 13.3 %
NEUTROPHILS # BLD AUTO: 2.9 10E9/L (ref 1.6–8.3)
NEUTROPHILS NFR BLD AUTO: 50.4 %
NITRATE UR QL: NEGATIVE
PH UR STRIP: 5.5 PH (ref 5–7)
PLATELET # BLD AUTO: 239 10E9/L (ref 150–450)
RBC # BLD AUTO: 5.11 10E12/L (ref 4.4–5.9)
SOURCE: NORMAL
SP GR UR STRIP: >1.03 (ref 1–1.03)
UROBILINOGEN UR STRIP-ACNC: 0.2 EU/DL (ref 0.2–1)
WBC # BLD AUTO: 5.7 10E9/L (ref 4–11)

## 2018-10-25 PROCEDURE — 85025 COMPLETE CBC W/AUTO DIFF WBC: CPT | Performed by: PHYSICIAN ASSISTANT

## 2018-10-25 PROCEDURE — 36415 COLL VENOUS BLD VENIPUNCTURE: CPT | Performed by: PHYSICIAN ASSISTANT

## 2018-10-25 PROCEDURE — 86140 C-REACTIVE PROTEIN: CPT | Performed by: PHYSICIAN ASSISTANT

## 2018-10-25 PROCEDURE — 81003 URINALYSIS AUTO W/O SCOPE: CPT | Performed by: PHYSICIAN ASSISTANT

## 2018-10-25 PROCEDURE — 99214 OFFICE O/P EST MOD 30 MIN: CPT | Performed by: PHYSICIAN ASSISTANT

## 2018-10-25 PROCEDURE — 85652 RBC SED RATE AUTOMATED: CPT | Performed by: PHYSICIAN ASSISTANT

## 2018-10-25 PROCEDURE — 80053 COMPREHEN METABOLIC PANEL: CPT | Performed by: PHYSICIAN ASSISTANT

## 2018-10-25 RX ORDER — CYCLOBENZAPRINE HCL 10 MG
5-10 TABLET ORAL
Qty: 30 TABLET | Refills: 0 | Status: SHIPPED | OUTPATIENT
Start: 2018-10-25 | End: 2018-11-29

## 2018-10-25 RX ORDER — PREDNISONE 20 MG/1
TABLET ORAL
Qty: 20 TABLET | Refills: 0 | Status: SHIPPED | OUTPATIENT
Start: 2018-10-25 | End: 2018-11-29

## 2018-10-25 RX ORDER — KETOROLAC TROMETHAMINE 30 MG/ML
60 INJECTION, SOLUTION INTRAMUSCULAR; INTRAVENOUS ONCE
Qty: 2 ML | Refills: 0 | OUTPATIENT
Start: 2018-10-25 | End: 2018-10-25

## 2018-10-25 NOTE — MR AVS SNAPSHOT
"              After Visit Summary   10/25/2018    Nick Villalobos    MRN: [id number]           Patient Information     Date Of Birth          1955        Visit Information        Provider Department      10/25/2018 3:20 PM Rose Webb PA-C [facility]        Today's Diagnoses     Flank pain    -  1       Follow-ups after your visit        Your next 10 appointments already scheduled     Nov 08, 2018  8:30 AM CST   Return Visit with Alcides Quiroz MD   [facility]    [address] [address] [phone]              Who to contact     If you have questions or need follow up information about today's clinic visit or your schedule please contact [facility] directly at [phone].  Normal or non-critical lab and imaging results will be communicated to you by CraigsBlueBookhart, letter or phone within 4 business days after the clinic has received the results. If you do not hear from us within 7 days, please contact the clinic through MyChart or phone. If you have a critical or abnormal lab result, we will notify you by phone as soon as possible.  Submit refill requests through Zencoder or call your pharmacy and they will forward the refill request to us. Please allow 3 business days for your refill to be completed.          Additional Information About Your Visit        MyChart Information     Zencoder lets you send messages to your doctor, view your test results, renew your prescriptions, schedule appointments and more. To sign up, go to [email or web address] . Click on \"Log in\" on the left side of the screen, which will take you to the Welcome page. Then click on \"Sign up Now\" on the right side of the page.     You will be asked to enter the access code listed below, as well as some personal information. Please follow the directions to create your username and password.     Your " "access code is: [email or web address]  Expires: 2019  4:34 PM     Your access code will  in 90 days. If you need help or a new code, please call your [facility] clinic or [phone].        Care EveryWhere ID     This is your Care EveryWhere ID. This could be used by other organizations to access your [facility] medical records  [id number]        Your Vitals Were     Pulse Temperature Height Pulse Oximetry BMI (Body Mass Index)       62 98.4  F (36.9  C) (Oral) 5' 9\" (1.753 m) 96% 32.19 kg/m2        Blood Pressure from Last 3 Encounters:   10/25/18 (!) 138/100   18 126/81   18 120/80    Weight from Last 3 Encounters:   10/25/18 218 lb (98.9 kg)   18 212 lb (96.2 kg)   18 219 lb (99.3 kg)              We Performed the Following     *UA reflex to Microscopic and Culture ([place] [facility] (except [place] and [place])     CBC with platelets and differential     Comprehensive metabolic panel     CRP, inflammation     ESR: Erythrocyte sedimentation rate          Today's Medication Changes          These changes are accurate as of 10/25/18  4:34 PM.  If you have any questions, ask your nurse or doctor.               Start taking these medicines.        Dose/Directions    cyclobenzaprine 10 MG tablet   Commonly known as:  FLEXERIL   Used for:  Flank pain   Started by:  Rose Webb PA-C        Dose:  5-10 mg   Take 0.5-1 tablets (5-10 mg) by mouth nightly as needed for muscle spasms   Quantity:  30 tablet   Refills:  0       ketorolac 60 MG/2ML Soln injection   Commonly known as:  TORADOL   Used for:  Flank pain   Started by:  Rose Webb PA-C        Dose:  60 mg   Inject 2 mLs (60 mg) into the muscle once for 1 dose   Quantity:  2 mL   Refills:  0       predniSONE 20 MG tablet   Commonly known as:  DELTASONE   Used for:  Flank pain   Started by:  Rose Webb PA-C        Take 60 mg daily for 3 days, then 40 mg daily for 3 days, then 20 mg daily for 3 days, " then 10 mg for 4 days   Quantity:  20 tablet   Refills:  0         Stop taking these medicines if you haven't already. Please contact your care team if you have questions.     pramipexole 0.25 MG tablet   Commonly known as:  MIRAPEX   Stopped by:  Rose Webb PA-C                Where to get your medicines      These medications were sent to [facility] [id number] [place] [address] AT [address] [address], [facility] [place] [address]    Hours:  24-hours Phone:  [phone]     cyclobenzaprine 10 MG tablet    predniSONE 20 MG tablet         Some of these will need a paper prescription and others can be bought over the counter.  Ask your nurse if you have questions.     You don't need a prescription for these medications     ketorolac 60 MG/2ML Soln injection                Primary Care Provider Office Phone # Fax #    Ned Myers [phone]       [address] [place] [address]        Equal Access to Services     [facility] : Hadii aleta ku hadasho Soomaali, waaxda luqadaha, qaybta kaalmada adeegyada, waxay juan hayusha rendon . So [facility] [phone].    ATENCIÓN: Si habla español, tiene a padilla disposición servicios gratuitos de asistencia lingüística. [facility] [phone].    We comply with applicable federal civil rights laws and [place] laws. We do not discriminate on the basis of race, color, national origin, age, disability, sex, sexual orientation, or gender identity.            Thank you!     Thank you for choosing [facility]  for your care. Our goal is always to provide you with excellent care. Hearing back from our patients is one way we can continue to improve our services. Please take a few minutes to complete the written survey that you may receive in the mail after your visit with us. Thank you!             Your Updated Medication List - Protect others around you: Learn how to safely  use, store and throw away your medicines at www.disposemymeds.org.          This list is accurate as of 10/25/18  4:34 PM.  Always use your most recent med list.                   Brand Name Dispense Instructions for use Diagnosis    cyclobenzaprine 10 MG tablet    FLEXERIL    30 tablet    Take 0.5-1 tablets (5-10 mg) by mouth nightly as needed for muscle spasms    Flank pain       ketorolac 60 MG/2ML Soln injection    TORADOL    2 mL    Inject 2 mLs (60 mg) into the muscle once for 1 dose    Flank pain       predniSONE 20 MG tablet    DELTASONE    20 tablet    Take 60 mg daily for 3 days, then 40 mg daily for 3 days, then 20 mg daily for 3 days, then 10 mg for 4 days    Flank pain
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
## 2018-10-26 LAB
ALBUMIN SERPL-MCNC: 3.8 G/DL (ref 3.4–5)
ALP SERPL-CCNC: 50 U/L (ref 40–150)
ALT SERPL W P-5'-P-CCNC: 35 U/L (ref 0–70)
ANION GAP SERPL CALCULATED.3IONS-SCNC: 8 MMOL/L (ref 3–14)
AST SERPL W P-5'-P-CCNC: 28 U/L (ref 0–45)
BILIRUB SERPL-MCNC: 0.4 MG/DL (ref 0.2–1.3)
BUN SERPL-MCNC: 21 MG/DL (ref 7–30)
CALCIUM SERPL-MCNC: 9.2 MG/DL (ref 8.5–10.1)
CHLORIDE SERPL-SCNC: 106 MMOL/L (ref 94–109)
CO2 SERPL-SCNC: 25 MMOL/L (ref 20–32)
CREAT SERPL-MCNC: 1.05 MG/DL (ref 0.66–1.25)
CRP SERPL-MCNC: <2.9 MG/L (ref 0–8)
GFR SERPL CREATININE-BSD FRML MDRD: 72 ML/MIN/1.7M2
GLUCOSE SERPL-MCNC: 81 MG/DL (ref 70–99)
POTASSIUM SERPL-SCNC: 4.1 MMOL/L (ref 3.4–5.3)
PROT SERPL-MCNC: 7.4 G/DL (ref 6.8–8.8)
SODIUM SERPL-SCNC: 139 MMOL/L (ref 133–144)

## 2018-10-28 NOTE — PROGRESS NOTES
Please call patient.  All labs are normal for urinary infection, inflammation, and kidney function is normal.  Please send update with how pt is feeling

## 2018-11-29 ENCOUNTER — OFFICE VISIT (OUTPATIENT)
Dept: DERMATOLOGY | Facility: CLINIC | Age: 63
End: 2018-11-29
Payer: COMMERCIAL

## 2018-11-29 VITALS — DIASTOLIC BLOOD PRESSURE: 87 MMHG | HEART RATE: 76 BPM | OXYGEN SATURATION: 96 % | SYSTOLIC BLOOD PRESSURE: 146 MMHG

## 2018-11-29 DIAGNOSIS — Z85.828 HISTORY OF SKIN CANCER: ICD-10-CM

## 2018-11-29 DIAGNOSIS — L82.1 SK (SEBORRHEIC KERATOSIS): ICD-10-CM

## 2018-11-29 DIAGNOSIS — L90.5 SCAR: ICD-10-CM

## 2018-11-29 DIAGNOSIS — D18.00 ANGIOMA: ICD-10-CM

## 2018-11-29 DIAGNOSIS — I78.1 TELANGIECTASIA: Primary | ICD-10-CM

## 2018-11-29 DIAGNOSIS — L81.4 LENTIGO: ICD-10-CM

## 2018-11-29 PROCEDURE — 99213 OFFICE O/P EST LOW 20 MIN: CPT | Mod: 25 | Performed by: DERMATOLOGY

## 2018-11-29 PROCEDURE — 11441 EXC FACE-MM B9+MARG 0.6-1 CM: CPT | Mod: 51 | Performed by: DERMATOLOGY

## 2018-11-29 PROCEDURE — 13151 CMPLX RPR E/N/E/L 1.1-2.5 CM: CPT | Performed by: DERMATOLOGY

## 2018-11-29 NOTE — PROGRESS NOTES
Nick Villalobos is a 62 year old year old male patient here today for hx of non-melanoma skin cancer.  He notes vessels on face and scar on nose with bumpy.  It is itching.   .  Patient states this has been present for since surgery.  .  Patient reports the following symptoms:  Itching. .  Patient reports the following previous treatments none.  Patient reports the following modifying factors none.  Associated symptoms: none.  Patient has no other skin complaints today.  Remainder of the HPI, Meds, PMH, Allergies, FH, and SH was reviewed in chart.      Past Medical History:   Diagnosis Date     Basal cell carcinoma      Esophageal reflux      Family history of malignant neoplasm of prostate 5/10/2007    Father at 53       Past Surgical History:   Procedure Laterality Date     HC COLONOSCOPY THRU STOMA, DIAGNOSTIC  6/15/07    Hyperplastic Polyp.  Needs repeat in 5 years     SURGICAL HISTORY OF -       tendon repair in right hand        Family History   Problem Relation Age of Onset     Prostate Cancer Father      Cancer - colorectal Father      C.A.D. Father       at 67, presumed MI     Hypertension Father      Cerebrovascular Disease Mother      at 64     Skin Cancer Paternal Grandmother        Social History     Social History     Marital status:      Spouse name: Cheryl     Number of children: 3     Years of education: N/A     Occupational History     Not on file.     Social History Main Topics     Smoking status: Former Smoker     Years: 30.00     Quit date: 2001     Smokeless tobacco: Never Used     Alcohol use Yes      Comment: 2x per month     Drug use: No     Sexual activity: Yes     Partners: Female     Other Topics Concern     Blood Transfusions No     Caffeine Concern No     Sleep Concern No     Exercise Yes     Runs 30-40 miles a month     Seat Belt Yes     Parent/Sibling W/ Cabg, Mi Or Angioplasty Before 65f 55m? Yes     Social History Narrative    [place] [employer]       Outpatient  Encounter Prescriptions as of 11/29/2018   Medication Sig Dispense Refill     [DISCONTINUED] cyclobenzaprine (FLEXERIL) 10 MG tablet Take 0.5-1 tablets (5-10 mg) by mouth nightly as needed for muscle spasms 30 tablet 0     [DISCONTINUED] predniSONE (DELTASONE) 20 MG tablet Take 60 mg daily for 3 days, then 40 mg daily for 3 days, then 20 mg daily for 3 days, then 10 mg for 4 days 20 tablet 0     No facility-administered encounter medications on file as of 11/29/2018.              Review Of Systems  Skin: As above  Eyes: negative  Ears/Nose/Throat: negative  Respiratory: No shortness of breath, dyspnea on exertion, cough, or hemoptysis  Cardiovascular: negative  Gastrointestinal: negative  Genitourinary: negative  Musculoskeletal: negative  Neurologic: negative  Psychiatric: negative  Hematologic/Lymphatic/Immunologic: negative  Endocrine: negative      O:   NAD, WDWN, Alert & Oriented, Mood & Affect wnl, Vitals stable   Here today alone   /87  Pulse 76  SpO2 96%   General appearance normal   Vitals stable   Alert, oriented and in no acute distress      Following lymph nodes palpated: Occipital, Cervical, Supraclavicular no lad   telangeictasia on face   Nasal bridge scar well healed with slight hypertrophy on nasal bridge     Stuck on papules and brown macules on trunk and ext   Red papules on trunk     The remainder of the full exam was unremarkable; the following areas were examined:  conjunctiva/lids, oral mucosa, neck, peripheral vascular system, abdomen, lymph nodes, digits/nails, eccrine and apocrine glands, scalp/hair, face, neck, chest, abdomen, buttocks, back, RUE, LUE, RLE, LLE       Eyes: Conjunctivae/lids:Normal     ENT: Lips, buccal mucosa, tongue: normal    MSK:Normal    Cardiovascular: peripheral edema none    Pulm: Breathing Normal    Lymph Nodes: No Head and Neck Lymphadenopathy     Neuro/Psych: Orientation:Normal; Mood/Affect:Normal      A/P:  1. Seborrheic keratosis, lentigo, angioma,   2.  Hx of non-melanoma skin cancer nose with scar  Revision discussed with patient   3. Telangiectasia   ipl discussed with patient will schedule  BENIGN LESIONS DISCUSSED WITH PATIENT:  I discussed the specifics of tumor, prognosis, and genetics of benign lesions.  I explained that treatment of these lesions would be purely cosmetic and not medically neccessary.  I discussed with patient different removal options including excision, cautery and /or laser.      Nature and genetics of benign skin lesions dicussed with patient.  Signs and Symptoms of skin cancer discussed with patient.  Patient encouraged to perform monthly skin exams.  UV precautions reviewed with patient.  Patient to follow up with Primary Care provider regarding elevated blood pressure.  Skin care regimen reviewed with patient: Eliminate harsh soaps, i.e. Dial, zest, irsih spring; Mild soaps such as Cetaphil or Dove sensitive skin, avoid hot or cold showers, aggressive use of emollients including vanicream, cetaphil or cerave discussed with patient.    Risks of non-melanoma skin cancer discussed with patient   Return to clinic 6 months      PROCEDURE NOTE  Nasal bridge revision scar 1.2cm   EXCISION OF BENIGN LESION AND COMPLEX: After thorough discussion of PGACAC, consent obtained, anesthesia and prep, the margins of the lesion were identified and an elliptical incision was made encompassing the lesion. The incisions were made through the skin and down to and including the subcutaneous tissue. The wound edges were widely undermined until adequate tissue mobility was obtained. hemostasis was achieved. The wound edges were then closed in a layered fashion, being careful not to leave any dead space. Postoperative length was 2 cm.   EBL minimal; complications none; wound care routine. The patient was discharged in good condition and will return in one week for wound evaluation.

## 2018-11-29 NOTE — LETTER
2018         RE: Nick Villalobos  [address] [facility] [address]        Dear Colleague,    Thank you for referring your patient, Nick Villalobos, to the [facility]. Please see a copy of my visit note below.    Nick Villalobos is a 62 year old year old male patient here today for hx of non-melanoma skin cancer.  He notes vessels on face and scar on nose with bumpy.  It is itching.   .  Patient states this has been present for since surgery.  .  Patient reports the following symptoms:  Itching. .  Patient reports the following previous treatments none.  Patient reports the following modifying factors none.  Associated symptoms: none.  Patient has no other skin complaints today.  Remainder of the HPI, Meds, PMH, Allergies, FH, and SH was reviewed in chart.      Past Medical History:   Diagnosis Date     Basal cell carcinoma      Esophageal reflux      Family history of malignant neoplasm of prostate 5/10/2007    Father at 53       Past Surgical History:   Procedure Laterality Date     HC COLONOSCOPY THRU STOMA, DIAGNOSTIC  6/15/07    Hyperplastic Polyp.  Needs repeat in 5 years     SURGICAL HISTORY OF -       tendon repair in right hand        Family History   Problem Relation Age of Onset     Prostate Cancer Father      Cancer - colorectal Father      C.A.D. Father       at 67, presumed MI     Hypertension Father      Cerebrovascular Disease Mother      at 64     Skin Cancer Paternal Grandmother        Social History     Social History     Marital status:      Spouse name: Cheryl     Number of children: 3     Years of education: N/A     Occupational History     Not on file.     Social History Main Topics     Smoking status: Former Smoker     Years: 30.00     Quit date: 2001     Smokeless tobacco: Never Used     Alcohol use Yes      Comment: 2x per month     Drug use: No     Sexual activity: Yes     Partners: Female     Other Topics  Concern     Blood Transfusions No     Caffeine Concern No     Sleep Concern No     Exercise Yes     Runs 30-40 miles a month     Seat Belt Yes     Parent/Sibling W/ Cabg, Mi Or Angioplasty Before 65f 55m? Yes     Social History Narrative    Demi adam       Outpatient Encounter Prescriptions as of 11/29/2018   Medication Sig Dispense Refill     [DISCONTINUED] cyclobenzaprine (FLEXERIL) 10 MG tablet Take 0.5-1 tablets (5-10 mg) by mouth nightly as needed for muscle spasms 30 tablet 0     [DISCONTINUED] predniSONE (DELTASONE) 20 MG tablet Take 60 mg daily for 3 days, then 40 mg daily for 3 days, then 20 mg daily for 3 days, then 10 mg for 4 days 20 tablet 0     No facility-administered encounter medications on file as of 11/29/2018.              Review Of Systems  Skin: As above  Eyes: negative  Ears/Nose/Throat: negative  Respiratory: No shortness of breath, dyspnea on exertion, cough, or hemoptysis  Cardiovascular: negative  Gastrointestinal: negative  Genitourinary: negative  Musculoskeletal: negative  Neurologic: negative  Psychiatric: negative  Hematologic/Lymphatic/Immunologic: negative  Endocrine: negative      O:   NAD, WDWN, Alert & Oriented, Mood & Affect wnl, Vitals stable   Here today alone   /87  Pulse 76  SpO2 96%   General appearance normal   Vitals stable   Alert, oriented and in no acute distress      Following lymph nodes palpated: Occipital, Cervical, Supraclavicular no lad   telangeictasia on face   Nasal bridge scar well healed with slight hypertrophy on nasal bridge     Stuck on papules and brown macules on trunk and ext   Red papules on trunk     The remainder of the full exam was unremarkable; the following areas were examined:  conjunctiva/lids, oral mucosa, neck, peripheral vascular system, abdomen, lymph nodes, digits/nails, eccrine and apocrine glands, scalp/hair, face, neck, chest, abdomen, buttocks, back, RUE, LUE, RLE, LLE       Eyes: Conjunctivae/lids:Normal     ENT: Lips,  buccal mucosa, tongue: normal    MSK:Normal    Cardiovascular: peripheral edema none    Pulm: Breathing Normal    Lymph Nodes: No Head and Neck Lymphadenopathy     Neuro/Psych: Orientation:Normal; Mood/Affect:Normal      A/P:  1. Seborrheic keratosis, lentigo, angioma,   2. Hx of non-melanoma skin cancer nose with scar  Revision discussed with patient   3. Telangiectasia   ipl discussed with patient will schedule  BENIGN LESIONS DISCUSSED WITH PATIENT:  I discussed the specifics of tumor, prognosis, and genetics of benign lesions.  I explained that treatment of these lesions would be purely cosmetic and not medically neccessary.  I discussed with patient different removal options including excision, cautery and /or laser.      Nature and genetics of benign skin lesions dicussed with patient.  Signs and Symptoms of skin cancer discussed with patient.  Patient encouraged to perform monthly skin exams.  UV precautions reviewed with patient.  Patient to follow up with Primary Care provider regarding elevated blood pressure.  Skin care regimen reviewed with patient: Eliminate harsh soaps, i.e. Dial, zest, irsih spring; Mild soaps such as Cetaphil or Dove sensitive skin, avoid hot or cold showers, aggressive use of emollients including vanicream, cetaphil or cerave discussed with patient.    Risks of non-melanoma skin cancer discussed with patient   Return to clinic 6 months      PROCEDURE NOTE  Nasal bridge revision scar 1.2cm   EXCISION OF BENIGN LESION AND COMPLEX: After thorough discussion of PGACAC, consent obtained, anesthesia and prep, the margins of the lesion were identified and an elliptical incision was made encompassing the lesion. The incisions were made through the skin and down to and including the subcutaneous tissue. The wound edges were widely undermined until adequate tissue mobility was obtained. hemostasis was achieved. The wound edges were then closed in a layered fashion, being careful not to leave  any dead space. Postoperative length was 2 cm.   EBL minimal; complications none; wound care routine. The patient was discharged in good condition and will return in one week for wound evaluation.        Again, thank you for allowing me to participate in the care of your patient.        Sincerely,        Alcides Quiroz MD

## 2018-11-29 NOTE — MR AVS SNAPSHOT
After Visit Summary   2018    Nick Villalobos    MRN: [id number]           Patient Information     Date Of Birth          1955        Visit Information        Provider Department      2018 1:30 PM Alcides Quiroz MD [facility]        Today's Diagnoses     Telangiectasia    -  1      Care Instructions      Sutured Wound Care     [facility]: [phone]    [facility]: [phone]          ? No strenuous activity for 48 hours. Resume moderate activity in 48 hours. No heavy exercising until you are seen for follow up in one week.     ? Take Tylenol as needed for discomfort.                         ? Do not drink alcoholic beverages for 48 hours.     ? Keep the pressure bandage in place for 24 hours. If the bandage becomes blood tinged or loose, reinforce it with gauze and tape.        (Refer to the reverse side of this page for management of bleeding).    ? Remove pressure bandage in 24 hours     ? Leave the flat bandage in place until your follow up appointment.    ? Keep the bandage dry. Wash around it carefully.    ? If the tape becomes soiled or starts to come off, reinforce it with additional paper tape.    ? Do not smoke for 3 weeks; smoking is detrimental to wound healing.    ? It is normal to have swelling and bruising around the surgical site. The bruising will fade in approximately 10-14 days. Elevate the area to reduce swelling.    ? Numbness, itchiness and sensitivity to temperature changes can occur after surgery and may take up to 18 months to normalize.      POSSIBLE COMPLICATIONS    BLEEDIN. Leave the bandage in place.  2. Use tightly rolled up gauze or a cloth to apply direct pressure over the bandage for 20   minutes.  3. Reapply pressure for an additional 20 minutes if necessary  4. Call the office or go to the nearest emergency room if pressure fails to stop the bleeding.  5. Use additional gauze and  "tape to maintain pressure once the bleeding has stopped.        PAIN:    1. Post operative pain should slowly get better, never worse.  2. A severe increase in pain may indicate a problem. Call the office if this occurs.    In case of emergency phone:Dr Quiroz [phone]            Follow-ups after your visit        Who to contact     If you have questions or need follow up information about today's clinic visit or your schedule please contact [facility] directly at [phone].  Normal or non-critical lab and imaging results will be communicated to you by Beemhart, letter or phone within 4 business days after the clinic has received the results. If you do not hear from us within 7 days, please contact the clinic through Beemhart or phone. If you have a critical or abnormal lab result, we will notify you by phone as soon as possible.  Submit refill requests through Shoulder Options or call your pharmacy and they will forward the refill request to us. Please allow 3 business days for your refill to be completed.          Additional Information About Your Visit        Shoulder Options Information     Shoulder Options lets you send messages to your doctor, view your test results, renew your prescriptions, schedule appointments and more. To sign up, go to [email or web address] . Click on \"Log in\" on the left side of the screen, which will take you to the Welcome page. Then click on \"Sign up Now\" on the right side of the page.     You will be asked to enter the access code listed below, as well as some personal information. Please follow the directions to create your username and password.     Your access code is: [email or web address]  Expires: 2019  3:34 PM     Your access code will  in 90 days. If you need help or a new code, please call your [place] clinic or [phone].        Care EveryWhere ID     This is your Care EveryWhere ID. This could be used by other organizations to access your [place] medical " records  [id number]        Your Vitals Were     Pulse Pulse Oximetry                76 96%           Blood Pressure from Last 3 Encounters:   11/29/18 146/87   10/25/18 (!) 138/100   08/16/18 126/81    Weight from Last 3 Encounters:   10/25/18 98.9 kg (218 lb)   06/21/18 96.2 kg (212 lb)   02/16/18 99.3 kg (219 lb)              Today, you had the following     No orders found for display       Primary Care Provider Office Phone # Fax #    Rose Webb PA-C [phone] [address] [facility] [address]        Equal Access to Services     MARTIN MARTINEZ : Hadii aleta rayoo Somarc, waaxda luqadaha, qaybta kaalmada adesilveryada, abel cheema. So [facility] [phone].    ATENCIÓN: Si habla español, tiene a padilla disposición servicios gratuitos de asistencia lingüística. Llame al [phone].    We comply with applicable federal civil rights laws and [place] laws. We do not discriminate on the basis of race, color, national origin, age, disability, sex, sexual orientation, or gender identity.            Thank you!     Thank you for choosing [facility]  for your care. Our goal is always to provide you with excellent care. Hearing back from our patients is one way we can continue to improve our services. Please take a few minutes to complete the written survey that you may receive in the mail after your visit with us. Thank you!             Your Updated Medication List - Protect others around you: Learn how to safely use, store and throw away your medicines at www.disposemymeds.org.      Notice  As of 11/29/2018  1:56 PM    You have not been prescribed any medications.

## 2018-11-29 NOTE — PATIENT INSTRUCTIONS
Sutured Wound Care     [facility]: [phone]    [facility]: [phone]          ? No strenuous activity for 48 hours. Resume moderate activity in 48 hours. No heavy exercising until you are seen for follow up in one week.     ? Take Tylenol as needed for discomfort.                         ? Do not drink alcoholic beverages for 48 hours.     ? Keep the pressure bandage in place for 24 hours. If the bandage becomes blood tinged or loose, reinforce it with gauze and tape.        (Refer to the reverse side of this page for management of bleeding).    ? Remove pressure bandage in 24 hours     ? Leave the flat bandage in place until your follow up appointment.    ? Keep the bandage dry. Wash around it carefully.    ? If the tape becomes soiled or starts to come off, reinforce it with additional paper tape.    ? Do not smoke for 3 weeks; smoking is detrimental to wound healing.    ? It is normal to have swelling and bruising around the surgical site. The bruising will fade in approximately 10-14 days. Elevate the area to reduce swelling.    ? Numbness, itchiness and sensitivity to temperature changes can occur after surgery and may take up to 18 months to normalize.      POSSIBLE COMPLICATIONS    BLEEDIN. Leave the bandage in place.  2. Use tightly rolled up gauze or a cloth to apply direct pressure over the bandage for 20   minutes.  3. Reapply pressure for an additional 20 minutes if necessary  4. Call the office or go to the nearest emergency room if pressure fails to stop the bleeding.  5. Use additional gauze and tape to maintain pressure once the bleeding has stopped.        PAIN:    1. Post operative pain should slowly get better, never worse.  2. A severe increase in pain may indicate a problem. Call the office if this occurs.    In case of emergency phone:Dr Quiroz [phone]

## 2018-12-07 ENCOUNTER — ALLIED HEALTH/NURSE VISIT (OUTPATIENT)
Dept: DERMATOLOGY | Facility: CLINIC | Age: 63
End: 2018-12-07
Payer: COMMERCIAL

## 2018-12-07 DIAGNOSIS — Z48.01 ENCOUNTER FOR CHANGE OR REMOVAL OF SURGICAL WOUND DRESSING: Primary | ICD-10-CM

## 2018-12-07 PROCEDURE — 99207 ZZC NO CHARGE NURSE ONLY: CPT

## 2018-12-07 NOTE — NURSING NOTE
Pt returned to clinic for post surgery 1 week follow up bandage change. Pt has no complaints, denies pain. Bandage removed from nasal bridge, area cleansed with normal saline. Site is healing and wound edges approximating well. Reapplied new steri strips and paper tape.    Advised to watch for signs/sx of infection; spreading redness, drainage, odor, fever. Call or report promptly to clinic. Pt given written instructions and informed to rtc as needed. Patient verbalized understanding.     SYD Landrum-BSN  [facility]  [phone]

## 2018-12-07 NOTE — PATIENT INSTRUCTIONS

## 2018-12-07 NOTE — MR AVS SNAPSHOT
After Visit Summary   12/7/2018    iNck Villalobos    MRN: [id number]           Patient Information     Date Of Birth          1955        Visit Information        Provider Department      12/7/2018 3:00 PM  DERM NURSE [facility]        Care Instructions    WOUND CARE INSTRUCTIONS  for  ONE WEEK AFTER SURGERY          1) Leave flat bandage on your skin for one week after today s bandage change.  2) In one week when you remove the bandage, you may resume your regular skin care routine, including washing with mild soap and water, applying moisturizer, make-up and sunscreen.    3) If there are any open or bleeding areas at the incision/graft site you should begin to cover the area with a bandage daily as follows:    1) Clean and dry the area with plain tap water using a Q-tip or sterile gauze pad.  2) Apply Polysporin or Bacitracin ointment to the open area.  3) Cover the wound with a band-aid or a sterile non-stick gauze pad and micropore paper tape.         SIGNS OF INFECTION  - If you notice any of these signs of infection, call your doctor right away: expanding redness around the wound.  - Yellow or greenish-colored pus or cloudy wound drainage.    - Red streaking spreading from the wound.  - Increased swelling, tenderness, or pain around the wound.   - Fever.    Please remember that yellow and clear drainage from a wound can be normal and related to normal wound healing.  Isolated drainage from a wound without a combination of the above features does not indicate infection.       *Once the bandages are removed, the scar will be red and firm (especially in the lip/chin area). This is normal and will fade in time. It might take 6-12 months for this to happen.     *Massaging the area will help the scar soften and fade quicker. Begin to massage the area one month after the bandages have been removed. To massage apply pressure directly and firmly over the scar with the  fingertips and move in a circular motion. Massage the area for a few minutes several times a day. Continue to massage the site for several months.    *Approximately 6-8 weeks after surgery it is not uncommon to see the formation of  tender pimple-like  bump along the scar. This is normal. As the scar continues to mature and the stitches underneath the skin begin to dissolve, this might occur. Do not pick or squeeze, this will resolve on it s own. Should one break open producing a small amount of drainage, apply Polysporin or Bacitracin ointment a few times a day until the wound is completely healed.    *Numbness in the surgical area is expected. It might take 12-18 months for the feeling to return to normal. During this time sensations of itchiness, tingling and occasional sharp pains might be noted. These feelings are normal and will subside once the nerves have completely healed.         IN CASE OF EMERGENCY: Dr Quiroz [phone]     If you were seen in [place] call: [phone]    If you were seen in [place] call: [phone]            Follow-ups after your visit        Your next 10 appointments already scheduled     Apr 04, 2019  8:15 AM CDT   PROCEDURE with Alcides Quiroz MD   [facility]    [address] [place] [address] [phone]            May 30, 2019  1:30 PM CDT   Return Visit with Alcides Quiroz MD   [facility] ([address] [address] [phone]              Who to contact     If you have questions or need follow up information about today's clinic visit or your schedule please contact [facility] directly at [phone].  Normal or non-critical lab and imaging results will be communicated to you by MyChart, letter or phone within 4 business days after the clinic has received  the results. If you do not hear from us within 7 days, please contact the clinic through UPEKt or phone. If you have a critical or abnormal lab result, we will notify you by phone as soon as possible.  Submit refill requests through Vizury or call your pharmacy and they will forward the refill request to us. Please allow 3 business days for your refill to be completed.          Additional Information About Your Visit        Care EveryWhere ID     This is your Care EveryWhere ID. This could be used by other organizations to access your [place] medical records  [id number]         Blood Pressure from Last 3 Encounters:   11/29/18 146/87   10/25/18 (!) 138/100   08/16/18 126/81    Weight from Last 3 Encounters:   10/25/18 98.9 kg (218 lb)   06/21/18 96.2 kg (212 lb)   02/16/18 99.3 kg (219 lb)              Today, you had the following     No orders found for display       Primary Care Provider Office Phone # Fax #    Rose Webb PA-C [phone]       [address] [place] [address]        Equal Access to Services     [facility] : Hadii aad ku hadasho Soomaali, waaxda luqadaha, qaybta kaalmada adeegyada, abel rendon . So [facility] [phone].    ATENCIÓN: Si habla español, tiene a padilla disposición servicios gratuitos de asistencia lingüística. [facility] [phone].    We comply with applicable federal civil rights laws and [place] laws. We do not discriminate on the basis of race, color, national origin, age, disability, sex, sexual orientation, or gender identity.            Thank you!     Thank you for choosing [facility]  for your care. Our goal is always to provide you with excellent care. Hearing back from our patients is one way we can continue to improve our services. Please take a few minutes to complete the written survey that you may receive in the mail after your visit with us. Thank you!             Your Updated  Medication List - Protect others around you: Learn how to safely use, store and throw away your medicines at www.disposemymeds.org.      Notice  As of 12/7/2018  3:13 PM    You have not been prescribed any medications.

## 2018-12-18 ENCOUNTER — OFFICE VISIT (OUTPATIENT)
Dept: FAMILY MEDICINE | Facility: CLINIC | Age: 63
End: 2018-12-18
Payer: COMMERCIAL

## 2018-12-18 VITALS
SYSTOLIC BLOOD PRESSURE: 138 MMHG | HEART RATE: 75 BPM | TEMPERATURE: 98.4 F | BODY MASS INDEX: 31.7 KG/M2 | HEIGHT: 69 IN | DIASTOLIC BLOOD PRESSURE: 80 MMHG | WEIGHT: 214 LBS | OXYGEN SATURATION: 97 %

## 2018-12-18 DIAGNOSIS — R03.0 ELEVATED BP WITHOUT DIAGNOSIS OF HYPERTENSION: ICD-10-CM

## 2018-12-18 DIAGNOSIS — J01.90 ACUTE SINUSITIS WITH SYMPTOMS > 10 DAYS: Primary | ICD-10-CM

## 2018-12-18 DIAGNOSIS — N18.2 CKD (CHRONIC KIDNEY DISEASE) STAGE 2, GFR 60-89 ML/MIN: ICD-10-CM

## 2018-12-18 PROCEDURE — 99214 OFFICE O/P EST MOD 30 MIN: CPT | Performed by: PHYSICIAN ASSISTANT

## 2018-12-18 ASSESSMENT — MIFFLIN-ST. JEOR: SCORE: 1766.08

## 2018-12-18 NOTE — PROGRESS NOTES
SUBJECTIVE:   Nick Villalobos is a 62 year old male who presents to clinic today for the following health issues.    Acute Illness   Acute illness concerns?- sore throat   Onset: 10 days f    Fever no    Chills/Sweats: YES- chills     Headache (location?):  YES    Sinus Pressure: YES    Conjunctivitis:  no    Ear Pain:  YES    Rhinorrhea:  YES    Congestion:  YES    Sore Throat:  no   Cough:   YES-productive of darker colored sputum    Wheeze:  no    Decreased Appetite: YES    Nausea:  no    Vomiting:  no    Diarrhea:  no    Dysuria/Freq.:  no    Fatigue/Achiness: YES- Fatigue, patent states his body feels tired.     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 He feels like the cough is going deeper into his lungs and would like abx help due to the length and worsening of sxs. He has felt feverish but has not taken his temperature. He admits he has not working out like he normally does and relates this laziness with patrizia this cough.       Elevated BP without diagnosis of HTN  BP Readings from Last 3 Encounters:   12/18/18 138/80   11/29/18 146/87   10/25/18 (!) 138/100         Problem list and histories reviewed & adjusted, as indicated.  Additional history: as documented    Patient Active Problem List   Diagnosis     Family history of malignant neoplasm of prostate     Esophageal reflux     Advanced directives, counseling/discussion     Colon polyps     Hyperlipidemia LDL goal <100     Restless legs syndrome     Past Surgical History:   Procedure Laterality Date     HC COLONOSCOPY THRU STOMA, DIAGNOSTIC  6/15/07    Hyperplastic Polyp.  Needs repeat in 5 years     SURGICAL HISTORY OF -       tendon repair in right hand       Social History     Tobacco Use     Smoking status: Former Smoker     Years: 30.00     Last attempt to quit: 12/20/2001  "    Years since quittin.0     Smokeless tobacco: Never Used   Substance Use Topics     Alcohol use: Yes     Comment: 2x per month     Family History   Problem Relation Age of Onset     Prostate Cancer Father      Cancer - colorectal Father      C.A.D. Father          at 67, presumed MI     Hypertension Father      Cerebrovascular Disease Mother         at 64     Skin Cancer Paternal Grandmother          Current Outpatient Medications   Medication Sig Dispense Refill     amoxicillin-clavulanate (AUGMENTIN) 875-125 MG tablet Take 1 tablet by mouth 2 times daily for 10 days 20 tablet 0     No Known Allergies    Reviewed and updated as needed this visit by clinical staff  Tobacco  Allergies  Meds  Problems  Med Hx  Surg Hx  Fam Hx  Soc Hx        Reviewed and updated as needed this visit by Provider  Tobacco  Allergies  Meds  Problems  Med Hx  Surg Hx  Fam Hx         ROS:  Constitutional, HEENT, cardiovascular, pulmonary, GI, , musculoskeletal, neuro, skin, endocrine and psych systems are negative, except as otherwise noted.    This document serves as a record of the services and decisions personally performed and made by Rose Webb, MS, PA-C. It was created on her behalf by Alyssa Gary, a trained medical scribe. The creation of this document is based on the provider's statements to the medical scribe.  Alyssa Gary 2018 11:16 AM      OBJECTIVE:   /80 (BP Location: Right arm, Cuff Size: Adult Regular)   Pulse 75   Temp 98.4  F (36.9  C) (Oral)   Ht 1.753 m (5' 9\")   Wt 97.1 kg (214 lb)   SpO2 97%   BMI 31.60 kg/m    Body mass index is 31.6 kg/m .    GENERAL: healthy, alert and no distress  EYES: Eyes grossly normal to inspection, PERRL and conjunctivae and sclerae normal  HENT: purulent effusion with retraction but no erythema on the right, mild erythema posterior pharynx no exudate, maxillary sinus tenderness, ear canals and TM's normal, nose and mouth " without ulcers or lesions  NECK: no adenopathy, no asymmetry, masses, or scars and thyroid normal to palpation  RESP: lungs clear to auscultation - no rales, rhonchi or wheezes  CV: regular rate and rhythm, normal S1 S2, no S3 or S4, no murmur, click or rub, no peripheral edema and peripheral pulses strong  MS: no gross musculoskeletal defects noted, no edema  SKIN: no suspicious lesions or rashes  NEURO: Normal strength and tone, mentation intact and speech normal  PSYCH: mentation appears normal, affect normal/bright    Diagnostic Test Results:  No results found for this or any previous visit (from the past 24 hour(s)).    ASSESSMENT/PLAN:   Nick was seen today for uri.    Diagnoses and all orders for this visit:    Elevated BP without diagnosis of hypertension  Highly encouraged Conner to keep a BP log - a piece of paper in wallet and check BP when at a grocery store and any other place with a BP machine. If BP remains uncontrolled will add a BP medication to protect kidneys due to CKD stage 2.     CKD (chronic kidney disease) stage 2, GFR 60-89 ml/min  Monitoring.    Acute sinusitis with symptoms > 10 days  Start Augmentin daily for 10 days. Advised to take the full script in its entirety to avoid resistance. Informed pt abx may give you loose stools - eat a yogurt or take a probiotic a day to minimize this. Keep well hydrated.   -     amoxicillin-clavulanate (AUGMENTIN) 875-125 MG tablet; Take 1 tablet by mouth 2 times daily for 10 days      Return in about 3 months (around 3/18/2019) for Physical Exam, Lab Work.    The information in this document, created by the medical scribe for me, accurately reflects the services I personally performed and the decisions made by me. I have reviewed and approved this document for accuracy prior to leaving the patient care area.  December 18, 2018 11:16 AM    Rose Webb, MS, PA-C  [facility]

## 2019-04-04 ENCOUNTER — OFFICE VISIT (OUTPATIENT)
Dept: DERMATOLOGY | Facility: CLINIC | Age: 64
End: 2019-04-04
Payer: COMMERCIAL

## 2019-04-04 VITALS — OXYGEN SATURATION: 94 % | HEART RATE: 91 BPM | SYSTOLIC BLOOD PRESSURE: 136 MMHG | DIASTOLIC BLOOD PRESSURE: 80 MMHG

## 2019-04-04 DIAGNOSIS — D48.5 NEOPLASM OF UNCERTAIN BEHAVIOR OF SKIN: Primary | ICD-10-CM

## 2019-04-04 PROCEDURE — 12041 INTMD RPR N-HF/GENIT 2.5CM/<: CPT | Performed by: DERMATOLOGY

## 2019-04-04 PROCEDURE — 11422 EXC H-F-NK-SP B9+MARG 1.1-2: CPT | Mod: 51 | Performed by: DERMATOLOGY

## 2019-04-04 PROCEDURE — 88305 TISSUE EXAM BY PATHOLOGIST: CPT | Mod: TC | Performed by: DERMATOLOGY

## 2019-04-04 NOTE — PATIENT INSTRUCTIONS
Open Wound Care     for ______________        ? No strenuous activity for 48 hours    ? Take Tylenol as needed for discomfort.                                                .         ? Do not drink alcoholic beverages for 48 hours.    ? Keep the pressure bandage in place for 24 hours. If the bandage becomes blood tinged or loose, reinforce it with gauze and tape.        (Refer to the reverse side of this page for management of bleeding).    ? Remove bandage in 24 hours and begin wound care as follows:     1. Clean area with tap water using a Q tip or gauze pad, (shower / bathe normally)  2. Dry wound with Q tip or gauze pad  3. Apply Aquaphor, Vaseline, Polysporin or Bacitracin Ointment with a Q tip    Do NOT use Neosporin Ointment *  4. Cover the wound with a band-aid or nonstick gauze pad and paper tape.  5. Repeat wound care once a day until wound is completely healed.    It is an old wives tale that a wound heals better when it is exposed to air and allowed to dry out. The wound will heal faster with a better cosmetic result if it is kept moist with ointment and covered with a bandage.  Do not let the wound dry out.      Supplies Needed:                Qtips or gauze pads                Polysporin or Bacitracin Ointment                Bandaids or nonstick gauze pads and paper tape    Wound care kits and brown paper tape are available for purchase at   the pharmacy.    BLEEDIN. Use tightly rolled up gauze or cloth to apply direct pressure over the bandage for 20   minutes.  2. Reapply pressure for an additional 20 minutes if necessary  3. Call the office or go to the nearest emergency room if pressure fails to stop the bleeding.  4. Use additional gauze and tape to maintain pressure once the bleeding has stopped.  5. Begin wound care 24 hours after surgery as directed.                  WOUND HEALING    1. One week after surgery a pink / red halo will form around the outside of the wound.   This is new  skin.  2. The center of the wound will appear yellowish white and produce some drainage.  3. The pink halo will slowly migrate in toward the center of the wound until the wound is covered with new shiny pink skin.  4. There will be no more drainage when the wound is completely healed.  5. It will take six months to one year for the redness to fade.  6. The scar may be itchy, tight and sensitive to extreme temperatures for a year after the surgery.  7. Massaging the area several times a day for several minutes after the wound is completely healed will help the scar soften and normalize faster. Begin massage only after healing is complete.      In case of emergency call: Dr Quiroz: [phone]     [facility]: [phone]    [facility]: [phone]

## 2019-04-04 NOTE — LETTER
2019         RE: Nick Villalobos  [address] [facility] [place] [address]        Dear Colleague,    Thank you for referring your patient, Nick Villalobos, to the [facility]. Please see a copy of my visit note below.    Nick Villalobos is a 63 year old year old male patient here today for evaluation and managment of tender spot on left heal.    .  Patient states this has been present for a while.  Patient reports the following symptoms:  painful.  Patient reports the following previous treatments none.  Patient reports the following modifying factors none.  Associated symptoms: none.  Patient has no other skin complaints today.  Remainder of the HPI, Meds, PMH, Allergies, FH, and SH was reviewed in chart.      Past Medical History:   Diagnosis Date     Basal cell carcinoma      Esophageal reflux      Family history of malignant neoplasm of prostate 5/10/2007    Father at 53       Past Surgical History:   Procedure Laterality Date     HC COLONOSCOPY THRU STOMA, DIAGNOSTIC  6/15/07    Hyperplastic Polyp.  Needs repeat in 5 years     SURGICAL HISTORY OF -       tendon repair in right hand        Family History   Problem Relation Age of Onset     Prostate Cancer Father      Cancer - colorectal Father      C.A.D. Father          at 67, presumed MI     Hypertension Father      Cerebrovascular Disease Mother         at 64     Skin Cancer Paternal Grandmother        Social History     Socioeconomic History     Marital status:      Spouse name: Cheryl     Number of children: 3     Years of education: Not on file     Highest education level: Not on file   Occupational History     Not on file   Social Needs     Financial resource strain: Not on file     Food insecurity:     Worry: Not on file     Inability: Not on file     Transportation needs:     Medical: Not on file     Non-medical: Not on file   Tobacco Use     Smoking status: Former Smoker     Years: 30.00      Last attempt to quit: 2001     Years since quittin.2     Smokeless tobacco: Never Used   Substance and Sexual Activity     Alcohol use: Yes     Comment: 2x per month     Drug use: No     Sexual activity: Yes     Partners: Female   Lifestyle     Physical activity:     Days per week: Not on file     Minutes per session: Not on file     Stress: Not on file   Relationships     Social connections:     Talks on phone: Not on file     Gets together: Not on file     Attends Pentec[employer]alism service: Not on file     Active member of club or organization: Not on file     Attends meetings of clubs or organizations: Not on file     Relationship status: Not on file     Intimate partner violence:     Fear of current or ex partner: Not on file     Emotionally abused: Not on file     Physically abused: Not on file     Forced sexual activity: Not on file   Other Topics Concern      Service Not Asked     Blood Transfusions No     Caffeine Concern No     Occupational Exposure Not Asked     Hobby Hazards Not Asked     Sleep Concern No     Stress Concern Not Asked     Weight Concern Not Asked     Special Diet Not Asked     Back Care Not Asked     Exercise Yes     Comment: Runs 30-40 miles a month     Bike Helmet Not Asked     Seat Belt Yes     Self-Exams Not Asked     Parent/sibling w/ CABG, MI or angioplasty before 65F 55M? Yes   Social History Narrative    [place] [employer]       Outpatient Encounter Medications as of 2019   Medication Sig Dispense Refill     [] ketorolac (TORADOL) 60 MG/2ML SOLN injection Inject 2 mLs (60 mg) into the muscle once for 1 dose 2 mL 0     [DISCONTINUED] amoxicillin-clavulanate (AUGMENTIN) 875-125 MG tablet Take 1 tablet by mouth 2 times daily for 10 days 20 tablet 0     No facility-administered encounter medications on file as of 2019.              Review Of Systems  Skin: As above  Eyes: negative  Ears/Nose/Throat: negative  Respiratory: No shortness of breath, dyspnea on exertion,  cough, or hemoptysis  Cardiovascular: negative  Gastrointestinal: negative  Genitourinary: negative  Musculoskeletal: negative  Neurologic: negative  Psychiatric: negative  Hematologic/Lymphatic/Immunologic: negative  Endocrine: negative      O:   NAD, WDWN, Alert & Oriented, Mood & Affect wnl, Vitals stable   Here today alone   /80   Pulse 91   SpO2 94%    General appearance normal   Vitals stable   Alert, oriented and in no acute distress     L heal of foot 1.2cm movable nodule      Eyes: Conjunctivae/lids:Normal     ENT: Lips, buccal mucosa, tongue: normal    MSK:Normal    Cardiovascular: peripheral edema none    Pulm: Breathing Normal    Neuro/Psych: Orientation:Normal; Mood/Affect:Normal      A/P:  1. L foot ? Cyst  EXCISION OF BENIGN LESION AND INT: After thorough discussion of PGACAC, consent obtained, anesthesia and prep, the margins of the lesion were identified and an elliptical incision was made encompassing the lesion. The incisions were made through the skin and down to and including the subcutaneous tissue. The lesion was removed en bloc and submitted for perms pathologic review. hemostasis was achieved. The wound edges were then closed in a layered fashion, being careful not to leave any dead space. Postoperative length was 1.5 cm.   EBL minimal; complications none; wound care routine. The patient was discharged in good condition and will return in one week for wound evaluation.  Return to clinic pending path  Conner to follow up with Primary Care provider regarding elevated blood pressure.      Again, thank you for allowing me to participate in the care of your patient.        Sincerely,        Alcides Quiroz MD

## 2019-04-04 NOTE — PROGRESS NOTES
Nick Villalobos is a 63 year old year old male patient here today for evaluation and managment of tender spot on left heal.    .  Patient states this has been present for a while.  Patient reports the following symptoms:  painful.  Patient reports the following previous treatments none.  Patient reports the following modifying factors none.  Associated symptoms: none.  Patient has no other skin complaints today.  Remainder of the HPI, Meds, PMH, Allergies, FH, and SH was reviewed in chart.      Past Medical History:   Diagnosis Date     Basal cell carcinoma      Esophageal reflux      Family history of malignant neoplasm of prostate 5/10/2007    Father at 53       Past Surgical History:   Procedure Laterality Date     HC COLONOSCOPY THRU STOMA, DIAGNOSTIC  6/15/07    Hyperplastic Polyp.  Needs repeat in 5 years     SURGICAL HISTORY OF -       tendon repair in right hand        Family History   Problem Relation Age of Onset     Prostate Cancer Father      Cancer - colorectal Father      C.A.D. Father          at 67, presumed MI     Hypertension Father      Cerebrovascular Disease Mother         at 64     Skin Cancer Paternal Grandmother        Social History     Socioeconomic History     Marital status:      Spouse name: Cheryl     Number of children: 3     Years of education: Not on file     Highest education level: Not on file   Occupational History     Not on file   Social Needs     Financial resource strain: Not on file     Food insecurity:     Worry: Not on file     Inability: Not on file     Transportation needs:     Medical: Not on file     Non-medical: Not on file   Tobacco Use     Smoking status: Former Smoker     Years: 30.00     Last attempt to quit: 2001     Years since quittin.2     Smokeless tobacco: Never Used   Substance and Sexual Activity     Alcohol use: Yes     Comment: 2x per month     Drug use: No     Sexual activity: Yes     Partners: Female   Lifestyle     Physical  activity:     Days per week: Not on file     Minutes per session: Not on file     Stress: Not on file   Relationships     Social connections:     Talks on phone: Not on file     Gets together: Not on file     Attends Voodoo service: Not on file     Active member of club or organization: Not on file     Attends meetings of clubs or organizations: Not on file     Relationship status: Not on file     Intimate partner violence:     Fear of current or ex partner: Not on file     Emotionally abused: Not on file     Physically abused: Not on file     Forced sexual activity: Not on file   Other Topics Concern      Service Not Asked     Blood Transfusions No     Caffeine Concern No     Occupational Exposure Not Asked     Hobby Hazards Not Asked     Sleep Concern No     Stress Concern Not Asked     Weight Concern Not Asked     Special Diet Not Asked     Back Care Not Asked     Exercise Yes     Comment: Runs 30-40 miles a month     Bike Helmet Not Asked     Seat Belt Yes     Self-Exams Not Asked     Parent/sibling w/ CABG, MI or angioplasty before 65F 55M? Yes   Social History Narrative    [place] [employer]       Outpatient Encounter Medications as of 2019   Medication Sig Dispense Refill     [] ketorolac (TORADOL) 60 MG/2ML SOLN injection Inject 2 mLs (60 mg) into the muscle once for 1 dose 2 mL 0     [DISCONTINUED] amoxicillin-clavulanate (AUGMENTIN) 875-125 MG tablet Take 1 tablet by mouth 2 times daily for 10 days 20 tablet 0     No facility-administered encounter medications on file as of 2019.              Review Of Systems  Skin: As above  Eyes: negative  Ears/Nose/Throat: negative  Respiratory: No shortness of breath, dyspnea on exertion, cough, or hemoptysis  Cardiovascular: negative  Gastrointestinal: negative  Genitourinary: negative  Musculoskeletal: negative  Neurologic: negative  Psychiatric: negative  Hematologic/Lymphatic/Immunologic: negative  Endocrine: negative      O:   NAD, WDWN, Alert  & Oriented, Mood & Affect wnl, Vitals stable   Here today alone   /80   Pulse 91   SpO2 94%    General appearance normal   Vitals stable   Alert, oriented and in no acute distress     L heal of foot 1.2cm movable nodule      Eyes: Conjunctivae/lids:Normal     ENT: Lips, buccal mucosa, tongue: normal    MSK:Normal    Cardiovascular: peripheral edema none    Pulm: Breathing Normal    Neuro/Psych: Orientation:Normal; Mood/Affect:Normal      A/P:  1. L foot ? Cyst  EXCISION OF BENIGN LESION AND INT: After thorough discussion of PGACAC, consent obtained, anesthesia and prep, the margins of the lesion were identified and an elliptical incision was made encompassing the lesion. The incisions were made through the skin and down to and including the subcutaneous tissue. The lesion was removed en bloc and submitted for perms pathologic review. hemostasis was achieved. The wound edges were then closed in a layered fashion, being careful not to leave any dead space. Postoperative length was 1.5 cm.   EBL minimal; complications none; wound care routine. The patient was discharged in good condition and will return in one week for wound evaluation.  Return to clinic pending path  Conner to follow up with Primary Care provider regarding elevated blood pressure.

## 2019-04-04 NOTE — NURSING NOTE
"Initial /80   Pulse 91   SpO2 94%  Estimated body mass index is 31.6 kg/m  as calculated from the following:    Height as of 12/18/18: 1.753 m (5' 9\").    Weight as of 12/18/18: 97.1 kg (214 lb). .      "

## 2019-04-08 LAB — COPATH REPORT: NORMAL

## 2019-04-09 ENCOUNTER — TELEPHONE (OUTPATIENT)
Dept: DERMATOLOGY | Facility: CLINIC | Age: 64
End: 2019-04-09

## 2019-04-09 NOTE — TELEPHONE ENCOUNTER
Called and LM for patient to call back in regards to biopsy results.    Diallo RN-BSN  [facility]  [phone]

## 2019-04-09 NOTE — LETTER
[address] [address]  Phone: [phone]  Fax: [phone]    April 11, 2019      Nick Villalobos                                                                                                              [address] [place] [id number]            Dear German LynnVoodoo,    We have been trying to get a hold of you in regards to your biopsy results. The spot on your left heel came back consistent with a lipoma. No further treatment is needed.     Please let me know if you have any questions.      Thank you,      [facility] / Stacey Tenorio RN-BSN

## 2019-04-09 NOTE — TELEPHONE ENCOUNTER
Notes recorded by Alcides Quiroz MD on 4/9/2019 at 8:01 AM CDT  FINAL DIAGNOSIS:   Skin, left heel of foot:   - Consistent with lipoma    No further treatment

## 2019-04-10 NOTE — TELEPHONE ENCOUNTER
Called and LM for patient to call back in regards to biopsy results.    Diallo RN-BSN  [facility]  [phone]

## 2019-04-11 NOTE — TELEPHONE ENCOUNTER
Called and LM for patient to call back in regards to biopsy results.    Diallo RN-BSN  [facility]  [phone]

## 2019-04-11 NOTE — TELEPHONE ENCOUNTER
After 3 phone call attempts, [employer] message sent:    Dear Mr. Villalobos,    We have been trying to get a hold of you in regards to your biopsy results. The spot on your left heel came back consistent with a lipoma. No further treatment is needed.     Please let me know if you have any questions.      Thank you,      [facility] / Stacey Tenorio RN-BSN

## 2019-04-23 NOTE — TELEPHONE ENCOUNTER
Now I understand what Yas meant - per attached. Pls let him know to contact sched also - as Dr. Quiroz is out 5/30/19.  Thank you.

## 2019-04-23 NOTE — TELEPHONE ENCOUNTER
Called and spoke to patient. Patient stated he had multiple missed phone calls from me. Informed patient we were just trying to get a hold of him in regards to his path results. Educated patient on path results- lipoma. No further treatment needed, patient voiced understanding.    Diallo, RN-BSN  [facility]  [phone]

## 2019-04-25 ENCOUNTER — TELEPHONE (OUTPATIENT)
Dept: DERMATOLOGY | Facility: CLINIC | Age: 64
End: 2019-04-25

## 2019-04-25 ENCOUNTER — OFFICE VISIT (OUTPATIENT)
Dept: FAMILY MEDICINE | Facility: CLINIC | Age: 64
End: 2019-04-25
Payer: COMMERCIAL

## 2019-04-25 VITALS
BODY MASS INDEX: 32.29 KG/M2 | WEIGHT: 218 LBS | TEMPERATURE: 97.2 F | HEIGHT: 69 IN | OXYGEN SATURATION: 97 % | SYSTOLIC BLOOD PRESSURE: 116 MMHG | HEART RATE: 66 BPM | DIASTOLIC BLOOD PRESSURE: 72 MMHG

## 2019-04-25 DIAGNOSIS — J02.9 SORE THROAT: ICD-10-CM

## 2019-04-25 DIAGNOSIS — J01.00 ACUTE NON-RECURRENT MAXILLARY SINUSITIS: Primary | ICD-10-CM

## 2019-04-25 DIAGNOSIS — J20.9 ACUTE BRONCHITIS, UNSPECIFIED ORGANISM: ICD-10-CM

## 2019-04-25 LAB
DEPRECATED S PYO AG THROAT QL EIA: NORMAL
SPECIMEN SOURCE: NORMAL

## 2019-04-25 PROCEDURE — 99213 OFFICE O/P EST LOW 20 MIN: CPT | Performed by: PHYSICIAN ASSISTANT

## 2019-04-25 PROCEDURE — 87081 CULTURE SCREEN ONLY: CPT | Performed by: PHYSICIAN ASSISTANT

## 2019-04-25 PROCEDURE — 87880 STREP A ASSAY W/OPTIC: CPT | Performed by: PHYSICIAN ASSISTANT

## 2019-04-25 ASSESSMENT — MIFFLIN-ST. JEOR: SCORE: 1779.22

## 2019-04-25 NOTE — PROGRESS NOTES
SUBJECTIVE:   Nick Villalobos is a 63 year old male who presents to clinic today for the following health issues.    Acute Illness   Acute illness concerns?- Hoarse Voice   Onset: 8 days     Fever no    Chills/Sweats: YES    Headache (location?):  no    Sinus Pressure: YES- slightly in gums     Conjunctivitis:  no    Ear Pain:  no    Rhinorrhea:  YES    Congestion:  YES    Sore Throat:  YES   Cough:   YES-productive of brown sputum    Wheeze:  YES    Decreased Appetite: YES    Nausea:  no    Vomiting:  no    Diarrhea:  no    Dysuria/Freq.:  no    Fatigue/Achiness: YES- Fatigue     Sick/Strep Exposure:  YES- mother    Therapies Tried and outcome: macus- not effective     Reports primary sxs are in his throat/cough with mild sinus sxs. Voice is very raspy and started losing his voice last night. Cough is very productive with brown phlegm. He noticed a white lesion in the back of his throat he thought to be strep. He has no energy and has been sleeping all the time.     Pt diagnosed with sinusitis in 2018 and treated with Augmentin 875-125 mg daily for 10 days.     Problem list and histories reviewed & adjusted, as indicated.  Additional history: as documented    Patient Active Problem List   Diagnosis     Family history of malignant neoplasm of prostate     Esophageal reflux     Advanced directives, counseling/discussion     Colon polyps     Hyperlipidemia LDL goal <100     Restless legs syndrome     Past Surgical History:   Procedure Laterality Date     HC COLONOSCOPY THRU STOMA, DIAGNOSTIC  6/15/07    Hyperplastic Polyp.  Needs repeat in 5 years     SURGICAL HISTORY OF -       tendon repair in right hand       Social History     Tobacco Use     Smoking status: Former Smoker     Years: 30.00     Last attempt to quit: 2001     Years since quittin.3     Smokeless tobacco: Never Used   Substance Use Topics     Alcohol use: Yes     Comment: 2x per month     Family History   Problem Relation Age  "of Onset     Prostate Cancer Father      Cancer - colorectal Father      C.A.D. Father          at 67, presumed MI     Hypertension Father      Cerebrovascular Disease Mother         at 64     Skin Cancer Paternal Grandmother          Current Outpatient Medications   Medication Sig Dispense Refill     amoxicillin-clavulanate (AUGMENTIN) 875-125 MG tablet Take 1 tablet by mouth 2 times daily for 10 days 20 tablet 0     No Known Allergies    Reviewed and updated as needed this visit by clinical staff  Tobacco  Allergies  Meds  Problems  Med Hx  Surg Hx  Fam Hx  Soc Hx        Reviewed and updated as needed this visit by Provider  Tobacco  Allergies  Meds  Problems  Med Hx  Surg Hx  Fam Hx  Soc Hx          ROS:  Constitutional, HEENT, cardiovascular, pulmonary, GI, , musculoskeletal, neuro, skin, endocrine and psych systems are negative, except as otherwise noted.    This document serves as a record of the services and decisions personally performed and made by TEJAS Shell. It was created on her behalf by Alyssa Gary, a trained medical scribe. The creation of this document is based on the provider's statements to the medical scribe.  Alyssa Gary 2019 11:00 AM      OBJECTIVE:   /72 (BP Location: Left arm, Cuff Size: Adult Regular)   Pulse 66   Temp 97.2  F (36.2  C) (Oral)   Ht 1.753 m (5' 9\")   Wt 98.9 kg (218 lb)   SpO2 97%   BMI 32.19 kg/m    Body mass index is 32.19 kg/m .    GENERAL: healthy, alert and no distress  EYES: Eyes grossly normal to inspection, PERRL and conjunctivae and sclerae normal  HENT: Post nasal drainage, maxillary sinus tenderness, ear canals and TM's normal, nose and mouth without ulcers or lesions  NECK: no adenopathy, no asymmetry, masses, or scars and thyroid normal to palpation  RESP: Coarse breathe sounds bilateral bases, lungs clear to auscultation - no rales, rhonchi or wheezes  CV: regular rate and rhythm, normal S1 S2, no S3 or " S4, no murmur, click or rub, no peripheral edema and peripheral pulses strong  MS: no gross musculoskeletal defects noted, no edema  SKIN: no suspicious lesions or rashes  NEURO: Normal strength and tone, mentation intact and speech normal  PSYCH: mentation appears normal, affect normal/bright  LYMPH: anterior cervical lymphadenopathy    Diagnostic Test Results:  Results for orders placed or performed in visit on 04/25/19 (from the past 24 hour(s))   Strep, Rapid Screen   Result Value Ref Range    Specimen Description Throat     Rapid Strep A Screen       NEGATIVE: No Group A streptococcal antigen detected by immunoassay, await culture report.       ASSESSMENT/PLAN:   Nick was seen today for uri.    Diagnoses and all orders for this visit:    Sore throat, Acute non-recurrent maxillary sinusitis, Acute bronchitis, unspecified organism  Rapid strep negative. Start Augmentin daily for 10 days. Continue symptomatic treatment, fever and pain management and keep well hydrated.  -     Strep, Rapid Screen  -     Beta strep group A culture  -     amoxicillin-clavulanate (AUGMENTIN) 875-125 MG tablet; Take 1 tablet by mouth 2 times daily for 10 days    Work note provided today - pt ok to return to work 4/26      Return in about 3 months (around 7/25/2019) for Physical Exam, Lab Work.    The information in this document, created by the medical scribe for me, accurately reflects the services I personally performed and the decisions made by me. I have reviewed and approved this document for accuracy prior to leaving the patient care area.  April 25, 2019 11:00 AM      Rose Webb MS, PAGeraldoC  [facility]

## 2019-04-25 NOTE — LETTER
April 26, 2019    Nick Villalobos  [address] [place] [address]  [employer] [place]      Nick Villalobos saw me on 4/4/2019.    Patient had a foot procedure/surgery that requires him to avoid physical exercise (workouts) and running from 4/1/19 through 06/30/19 per provider orders            Sincerely,      Alcides Quiroz MD

## 2019-04-25 NOTE — TELEPHONE ENCOUNTER
Reason for Call:  Other letter    Detailed comments: Pt had a cyst removed from his heel by Dr Quiroz 4/4/19.  Pt is requesting a letter that states that he shouldn't be doing any work outs or running for the months of April, May and June.  Pt is a member at [employer] in [place].  Pt is hoping not to be charged by the [employer] for those months.  Please mail the letter to the pt's home.    Phone Number Patient can be reached at: Cell number on file:    Telephone Information:   Mobile [phone]       Best Time: anytime    Can we leave a detailed message on this number? YES    Call taken on 4/25/2019 at 2:30 PM by MERCEDES REHMAN

## 2019-04-26 LAB
BACTERIA SPEC CULT: NORMAL
SPECIMEN SOURCE: NORMAL

## 2019-04-26 NOTE — TELEPHONE ENCOUNTER
Letter pended if provider agree's and routed to Dr. Quiroz    Thank you,    Natali HAASRN BSN  [facility]  [phone]  [facility]  [phone]

## 2019-09-26 ENCOUNTER — OFFICE VISIT (OUTPATIENT)
Dept: FAMILY MEDICINE | Facility: CLINIC | Age: 64
End: 2019-09-26
Payer: COMMERCIAL

## 2019-09-26 VITALS
OXYGEN SATURATION: 97 % | HEART RATE: 55 BPM | TEMPERATURE: 97.1 F | DIASTOLIC BLOOD PRESSURE: 80 MMHG | SYSTOLIC BLOOD PRESSURE: 134 MMHG | WEIGHT: 220 LBS | BODY MASS INDEX: 32.49 KG/M2

## 2019-09-26 DIAGNOSIS — J32.9 SINUSITIS, UNSPECIFIED CHRONICITY, UNSPECIFIED LOCATION: Primary | ICD-10-CM

## 2019-09-26 PROCEDURE — 99213 OFFICE O/P EST LOW 20 MIN: CPT | Performed by: FAMILY MEDICINE

## 2019-09-26 NOTE — PROGRESS NOTES
Subjective     Nick Villalobos is a 63 year old male who presents to clinic today for the following health issues:    HPI   Acute Illness   Acute illness concerns: cough, congestion, wheezing   Onset: 3 days, nasal congestion and sinus congestion is worsening.    Fever: no    Chills/Sweats: no    Headache (location?): no    Sinus Pressure:yes    Conjunctivitis:  no    Ear Pain: no    Rhinorrhea: no    Congestion: YES    Sore Throat: no     Cough: YES    Wheeze: YES    Decreased Appetite: no    Nausea: no    Vomiting: no    Diarrhea:  no    Dysuria/Freq.: no    Fatigue/Achiness: YES    Sick/Strep Exposure: no     Therapies Tried and outcome: NONE - has used augmentin in the past and would like that               Reviewed and updated as needed this visit by Provider         Review of Systems   ROS COMP: Constitutional, HEENT, cardiovascular, pulmonary, gi and gu systems are negative, except as otherwise noted.      Objective    /80   Pulse 55   Temp 97.1  F (36.2  C) (Tympanic)   Wt 99.8 kg (220 lb)   SpO2 97%   BMI 32.49 kg/m    Body mass index is 32.49 kg/m .  Physical Exam   GENERAL: healthy, alert and no distress  NECK: no adenopathy, no asymmetry, masses, or scars and thyroid normal to palpation  RESP: lungs clear to auscultation - no rales, rhonchi or wheezes  Sinus tenderness  CV: regular rate and rhythm, normal S1 S2, no S3 or S4, no murmur, click or rub, no peripheral edema and peripheral pulses strong            Assessment & Plan     1. Sinusitis, unspecified chronicity, unspecified location  Patient has upper respiratory symptoms sinusitis.  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  I suggested continue doing symptomatic care with salt water gargles saline nasal rinse and if symptoms are not improving he can start Augmentin.  - amoxicillin-clavulanate (AUGMENTIN) 875-125 MG tablet; Take 1 tablet by mouth 2 times daily for 7 days  Dispense: 14 tablet; Refill:  "0     BMI:   Estimated body mass index is 32.49 kg/m  as calculated from the following:    Height as of 4/25/19: 1.753 m (5' 9\").    Weight as of this encounter: 99.8 kg (220 lb).     Christiano Mcintosh MD  [facility] [place]      "

## 2019-12-07 ENCOUNTER — OFFICE VISIT (OUTPATIENT)
Dept: URGENT CARE | Facility: URGENT CARE | Age: 64
End: 2019-12-07
Payer: COMMERCIAL

## 2019-12-07 VITALS
HEART RATE: 56 BPM | TEMPERATURE: 97.8 F | BODY MASS INDEX: 33.37 KG/M2 | RESPIRATION RATE: 16 BRPM | OXYGEN SATURATION: 97 % | SYSTOLIC BLOOD PRESSURE: 136 MMHG | DIASTOLIC BLOOD PRESSURE: 72 MMHG | WEIGHT: 226 LBS

## 2019-12-07 DIAGNOSIS — J06.9 VIRAL UPPER RESPIRATORY TRACT INFECTION: Primary | ICD-10-CM

## 2019-12-07 PROCEDURE — 99213 OFFICE O/P EST LOW 20 MIN: CPT | Performed by: PHYSICIAN ASSISTANT

## 2019-12-07 RX ORDER — AZITHROMYCIN 250 MG/1
TABLET, FILM COATED ORAL
Qty: 6 TABLET | Refills: 0 | Status: SHIPPED | OUTPATIENT
Start: 2019-12-07 | End: 2019-12-12

## 2019-12-07 NOTE — PROGRESS NOTES
SUBJECTIVE:   Nick Villalobos is a 63 year old male presenting with a chief complaint of cough - productive.  Onset of symptoms was 1 week(s) ago.  Course of illness is worsening.    Severity moderate  Current and Associated symptoms: runny nose, stuffy nose, cough - productive, sore throat and fatigue  Treatment measures tried include Decongestants, Antihistamine and OTC Cough med.  Predisposing factors include ill contact: Family member who did get A z pack prescribed.    Past Medical History:   Diagnosis Date     Basal cell carcinoma      Esophageal reflux      Family history of malignant neoplasm of prostate 5/10/2007    Father at 53     No current outpatient medications on file.     Social History     Tobacco Use     Smoking status: Former Smoker     Years: 30.00     Last attempt to quit: 2001     Years since quittin.9     Smokeless tobacco: Never Used   Substance Use Topics     Alcohol use: Yes     Comment: 2x per month       ROS:  CONSTITUTIONAL:fatigue  EYES: NEGATIVE for vision changes or irritation  ENT/MOUTH: NEGATIVE for ear, mouth and throat problems  RESP:cough-productive    OBJECTIVE:  /72 (BP Location: Left arm, Patient Position: Chair, Cuff Size: Adult Large)   Pulse 56   Temp 97.8  F (36.6  C) (Tympanic)   Resp 16   Wt 102.5 kg (226 lb)   SpO2 97%   BMI 33.37 kg/m    GENERAL APPEARANCE: healthy, alert and no distress  EYES: EOMI,  PERRL, conjunctiva clear  HENT: ear canals and TM's normal.  Nose and mouth without ulcers, erythema or lesions  NECK: supple, nontender, no lymphadenopathy  RESP: lungs clear to auscultation - no rales, rhonchi or wheezes  CV: regular rates and rhythm, normal S1 S2, no murmur noted  NEURO: Normal strength and tone, sensory exam grossly normal,  normal speech and mentation  SKIN: no suspicious lesions or rashes    ASSESSMENT:    1. Viral upper respiratory tract infection    - azithromycin (ZITHROMAX) 250 MG tablet; Take 2 tablets (500 mg) by  mouth daily for 1 day, THEN 1 tablet (250 mg) daily for 4 days.  Dispense: 6 tablet; Refill: 0    PLAN: discussed a viral infection is not helped with anti-bacterial medication. He says he wants it anyway.    He will follow up with primary provider if not improving over the next week.   See orders in Epic

## 2019-12-28 NOTE — TELEPHONE ENCOUNTER
Called pt and let him know letter and rx are ready at  for .  Ange Ellsworth      
Dates placed in letter     Letter written and at      Pt notified     Patient stated an understanding and agreed with plan.    Kaela Mendoza RN, BSN  [facility]       
Dr. Weiler or Ivory Joiner PA-C See message below. Patient needs provider note to put gym club membership on hold.     Dr. Weiler also please see Refill encounter from today (will route) patient requesting to speak with you.     Please advise.   Thank you, Analia Rosenberg R.N.    
Name of caller: Nick  Relationship of Patient: Self    Reason for Call: Patient states that the note we gave him was not sufficient for his health club and they want specific dates from 07/14-10/1 otherwise they will not accept it and continue to charge him.     Best phone number to reach pt at is: [phone]  Ok to leave a message with medical info? Yes    Pharmacy preferred (if calling for a refill): [facility]      
Name of caller: Nick  Relationship of Patient: Slemt    Reason for Call: Patient belongs to the [employer] [facility] and since he cannot go right now due to his knee he doesn't want to pay for the club but they wont allow him to break his contract without a doctors note excusing why he cannot come in.     Best phone number to reach pt at is: [phone]  Ok to leave a message with medical info? Yes    Pharmacy preferred (if calling for a refill): [facility]      
OK for note stating that gym membership is to be put on hold for the next 2 months due to an injury    Ivory Joiner PA-C  
Unknown

## 2019-12-30 ENCOUNTER — OFFICE VISIT (OUTPATIENT)
Dept: PODIATRY | Facility: CLINIC | Age: 64
End: 2019-12-30
Payer: COMMERCIAL

## 2019-12-30 VITALS
HEIGHT: 69 IN | DIASTOLIC BLOOD PRESSURE: 80 MMHG | WEIGHT: 226 LBS | SYSTOLIC BLOOD PRESSURE: 140 MMHG | BODY MASS INDEX: 33.47 KG/M2

## 2019-12-30 DIAGNOSIS — M79.671 RIGHT FOOT PAIN: Primary | ICD-10-CM

## 2019-12-30 DIAGNOSIS — Q66.70 PES CAVUS: ICD-10-CM

## 2019-12-30 DIAGNOSIS — M72.2 PLANTAR FASCIITIS, RIGHT: ICD-10-CM

## 2019-12-30 PROCEDURE — 99203 OFFICE O/P NEW LOW 30 MIN: CPT | Mod: 25 | Performed by: PODIATRIST

## 2019-12-30 PROCEDURE — 20550 NJX 1 TENDON SHEATH/LIGAMENT: CPT | Mod: RT | Performed by: PODIATRIST

## 2019-12-30 ASSESSMENT — MIFFLIN-ST. JEOR: SCORE: 1810.51

## 2019-12-30 NOTE — PROGRESS NOTES
PATIENT HISTORY:  Nick Villalobos is a 64 year old male who presents to clinic for pain to the right heel. Notes that it has been going on for 3months. Denies injury. Pain is 10/10. Very sore when getting up in the morning. Will limp. Notes pain is sharp. Has tried massage.  Does not help much. Wondering what is causing the pain and what can be done for it.     Review of Systems:  Patient denies fever, chills, rash, wound, stiffness, numbness, weakness, heart burn, blood in stool, chest pain with activity, calf pain when walking, shortness of breath with activity, chronic cough, easy bleeding/bruising, swelling of ankles, excessive thirst, fatigue, depression, anxiety.  Patient admits to limping.     PAST MEDICAL HISTORY:   Past Medical History:   Diagnosis Date     Basal cell carcinoma      Esophageal reflux      Family history of malignant neoplasm of prostate 5/10/2007    Father at 53        PAST SURGICAL HISTORY:   Past Surgical History:   Procedure Laterality Date     HC COLONOSCOPY THRU STOMA, DIAGNOSTIC  6/15/07    Hyperplastic Polyp.  Needs repeat in 5 years     SURGICAL HISTORY OF -       tendon repair in right hand        MEDICATIONS: No current outpatient medications on file.     ALLERGIES:  No Known Allergies     SOCIAL HISTORY:   Social History     Socioeconomic History     Marital status:      Spouse name: Cheryl     Number of children: 3     Years of education: Not on file     Highest education level: Not on file   Occupational History     Not on file   Social Needs     Financial resource strain: Not on file     Food insecurity:     Worry: Not on file     Inability: Not on file     Transportation needs:     Medical: Not on file     Non-medical: Not on file   Tobacco Use     Smoking status: Former Smoker     Years: 30.00     Last attempt to quit: 2001     Years since quittin.0     Smokeless tobacco: Never Used   Substance and Sexual Activity     Alcohol use: Yes     Comment: 2x  "per month     Drug use: No     Sexual activity: Yes     Partners: Female   Lifestyle     Physical activity:     Days per week: Not on file     Minutes per session: Not on file     Stress: Not on file   Relationships     Social connections:     Talks on phone: Not on file     Gets together: Not on file     Attends [employer] service: Not on file     Active member of club or organization: Not on file     Attends meetings of clubs or organizations: Not on file     Relationship status: Not on file     Intimate partner violence:     Fear of current or ex partner: Not on file     Emotionally abused: Not on file     Physically abused: Not on file     Forced sexual activity: Not on file   Other Topics Concern      Service Not Asked     Blood Transfusions No     Caffeine Concern No     Occupational Exposure Not Asked     Hobby Hazards Not Asked     Sleep Concern No     Stress Concern Not Asked     Weight Concern Not Asked     Special Diet Not Asked     Back Care Not Asked     Exercise Yes     Comment: Runs 30-40 miles a month     Bike Helmet Not Asked     Seat Belt Yes     Self-Exams Not Asked     Parent/sibling w/ CABG, MI or angioplasty before 65F 55M? Yes   Social History Narrative    [place] coins        FAMILY HISTORY:   Family History   Problem Relation Age of Onset     Prostate Cancer Father      Cancer - colorectal Father      C.A.D. Father          at 67, presumed MI     Hypertension Father      Cerebrovascular Disease Mother         at 64     Skin Cancer Paternal Grandmother         EXAM:Vitals: Ht 1.753 m (5' 9\")   Wt 102.5 kg (226 lb)   BMI 33.37 kg/m    BMI= Body mass index is 33.37 kg/m .    General appearance: Patient is alert and fully cooperative with history & exam.  No sign of distress is noted during the visit.     Psychiatric: Affect is pleasant & appropriate.  Patient appears motivated to improve health.     Respiratory: Breathing is regular & unlabored while sitting.     HEENT: Hearing is " intact to spoken word.  Speech is clear.  No gross evidence of visual impairment that would impact ambulation.     Dermatologic: Skin is intact to both lower extremities without significant lesions, rash or abrasion.  No paronychia or evidence of soft tissue infection is noted.     Vascular: DP & PT pulses are intact & regular bilaterally.  No significant edema or varicosities noted.  CFT and skin temperature is normal to both lower extremities.     Neurologic: Lower extremity sensation is intact to light touch.  No evidence of weakness or contracture in the lower extremities.  No evidence of neuropathy.     Musculoskeletal: Patient is ambulatory without assistive device or brace.  Increase arch height. Pain on palpation of medial right plantar heel.      ASSESSMENT:    Right foot pain  Plantar fasciitis, right  Pes cavus     PLAN:  Reviewed [facility] in [facility]. The potential causes and nature of plantar fasciitis were discussed with the patient.  We reviewed the natural history/prognosis of the condition and risks if left untreated.  These include chronic pain, other sites of pain due to gait changes, and potential plantar fascial rupture.      We discussed possible causes of the condition as it relates to the patients specific situation.      Conservative treatment options were reviewed:  appropriate shoes, avoidance of barefoot walking, inserts/orthoses, stretching, ice, massage, immobilization and NSAIDs.     We also reviewed the options of injection therapy and surgery.  However, it was made clear that surgery is only considered when conservative therapy fails.  The risks and benefits of injection therapy, and surgery were discussed.     After thorough discussion and answering all questions, the patient elected to try stretches, injection, topical pain cream, nsaids, icing, night splint. If pain continues, recommend boot and MRI of ankle.      Procedure: After verbal consent, the patients max point of  tenderness was marked out on the right plantar heel.  This area was prepped and draped using sterile technique.  An injection of 1cc of 1% lidocaine plain and 1 1/2 cc of Kenalog-40 was injected into the max point of tenderness.  This was distributed in a fanning motion.  Patient tolerated the procedure and anesthesia well.      Linda Medina DPM, Podiatry/Foot and Ankle Surgery    Weight management plan: Patient was referred to their PCP to discuss a diet and exercise plan.    Recommended to Nick Villalobos to follow up with Primary Care provider regarding elevated blood pressure.

## 2019-12-30 NOTE — LETTER
12/30/2019         RE: Nick Villalobos  [address] [facility] [address]        Dear Colleague,    Thank you for referring your patient, Nick Villalobos[facility]. Please see a copy of my visit note below.    PATIENT HISTORY:  Nick Villalobos is a 64 year old male who presents to clinic for pain to the right heel. Notes that it has been going on for 3months. Denies injury. Pain is 10/10. Very sore when getting up in the morning. Will limp. Notes pain is sharp. Has tried massage.  Does not help much. Wondering what is causing the pain and what can be done for it.     Review of Systems:  Patient denies fever, chills, rash, wound, stiffness, numbness, weakness, heart burn, blood in stool, chest pain with activity, calf pain when walking, shortness of breath with activity, chronic cough, easy bleeding/bruising, swelling of ankles, excessive thirst, fatigue, depression, anxiety.  Patient admits to limping.     PAST MEDICAL HISTORY:   Past Medical History:   Diagnosis Date     Basal cell carcinoma      Esophageal reflux      Family history of malignant neoplasm of prostate 5/10/2007    Father at 53        PAST SURGICAL HISTORY:   Past Surgical History:   Procedure Laterality Date     HC COLONOSCOPY THRU STOMA, DIAGNOSTIC  6/15/07    Hyperplastic Polyp.  Needs repeat in 5 years     SURGICAL HISTORY OF -       tendon repair in right hand        MEDICATIONS: No current outpatient medications on file.     ALLERGIES:  No Known Allergies     SOCIAL HISTORY:   Social History     Socioeconomic History     Marital status:      Spouse name: Cheryl     Number of children: 3     Years of education: Not on file     Highest education level: Not on file   Occupational History     Not on file   Social Needs     Financial resource strain: Not on file     Food insecurity:     Worry: Not on file     Inability: Not on file     Transportation needs:     Medical: Not on file      "Non-medical: Not on file   Tobacco Use     Smoking status: Former Smoker     Years: 30.00     Last attempt to quit: 2001     Years since quittin.0     Smokeless tobacco: Never Used   Substance and Sexual Activity     Alcohol use: Yes     Comment: 2x per month     Drug use: No     Sexual activity: Yes     Partners: Female   Lifestyle     Physical activity:     Days per week: Not on file     Minutes per session: Not on file     Stress: Not on file   Relationships     Social connections:     Talks on phone: Not on file     Gets together: Not on file     Attends Cheondoism service: Not on file     Active member of club or organization: Not on file     Attends meetings of clubs or organizations: Not on file     Relationship status: Not on file     Intimate partner violence:     Fear of current or ex partner: Not on file     Emotionally abused: Not on file     Physically abused: Not on file     Forced sexual activity: Not on file   Other Topics Concern      Service Not Asked     Blood Transfusions No     Caffeine Concern No     Occupational Exposure Not Asked     Hobby Hazards Not Asked     Sleep Concern No     Stress Concern Not Asked     Weight Concern Not Asked     Special Diet Not Asked     Back Care Not Asked     Exercise Yes     Comment: Runs 30-40 miles a month     Bike Helmet Not Asked     Seat Belt Yes     Self-Exams Not Asked     Parent/sibling w/ CABG, MI or angioplasty before 65F 55M? Yes   Social History Narrative    [place] [employer]        FAMILY HISTORY:   Family History   Problem Relation Age of Onset     Prostate Cancer Father      Cancer - colorectal Father      C.A.D. Father          at 67, presumed MI     Hypertension Father      Cerebrovascular Disease Mother         at 64     Skin Cancer Paternal Grandmother         EXAM:Vitals: Ht 1.753 m (5' 9\")   Wt 102.5 kg (226 lb)   BMI 33.37 kg/m     BMI= Body mass index is 33.37 kg/m .    General appearance: Patient is alert and fully " cooperative with history & exam.  No sign of distress is noted during the visit.     Psychiatric: Affect is pleasant & appropriate.  Patient appears motivated to improve health.     Respiratory: Breathing is regular & unlabored while sitting.     HEENT: Hearing is intact to spoken word.  Speech is clear.  No gross evidence of visual impairment that would impact ambulation.     Dermatologic: Skin is intact to both lower extremities without significant lesions, rash or abrasion.  No paronychia or evidence of soft tissue infection is noted.     Vascular: DP & PT pulses are intact & regular bilaterally.  No significant edema or varicosities noted.  CFT and skin temperature is normal to both lower extremities.     Neurologic: Lower extremity sensation is intact to light touch.  No evidence of weakness or contracture in the lower extremities.  No evidence of neuropathy.     Musculoskeletal: Patient is ambulatory without assistive device or brace.  Increase arch height. Pain on palpation of medial right plantar heel.      ASSESSMENT:    Right foot pain  Plantar fasciitis, right  Pes cavus     PLAN:  Reviewed patient's chart in [facility]. The potential causes and nature of plantar fasciitis were discussed with the patient.  We reviewed the natural history/prognosis of the condition and risks if left untreated.  These include chronic pain, other sites of pain due to gait changes, and potential plantar fascial rupture.      We discussed possible causes of the condition as it relates to the patients specific situation.      Conservative treatment options were reviewed:  appropriate shoes, avoidance of barefoot walking, inserts/orthoses, stretching, ice, massage, immobilization and NSAIDs.     We also reviewed the options of injection therapy and surgery.  However, it was made clear that surgery is only considered when conservative therapy fails.  The risks and benefits of injection therapy, and surgery were discussed.     After  thorough discussion and answering all questions, the patient elected to try stretches, injection, topical pain cream, nsaids, icing, night splint. If pain continues, recommend boot and MRI of ankle.      Procedure: After verbal consent, the patients max point of tenderness was marked out on the right plantar heel.  This area was prepped and draped using sterile technique.  An injection of 1cc of 1% lidocaine plain and 1 1/2 cc of Kenalog-40 was injected into the max point of tenderness.  This was distributed in a fanning motion.  Patient tolerated the procedure and anesthesia well.      Linda Medina DPM, Podiatry/Foot and Ankle Surgery    Weight management plan: Patient was referred to their PCP to discuss a diet and exercise plan.    Recommended to Nick Villalobos to follow up with Primary Care provider regarding elevated blood pressure.        Again, thank you for allowing me to participate in the care of your patient.        Sincerely,        Linda Medina DPM, Podiatry/Foot and Ankle Surgery

## 2019-12-30 NOTE — PATIENT INSTRUCTIONS
Thank you for choosing [facility]iat[facility] / Foot & Ankle Surgery!    DR. SIMMS'S CLINIC SCHEDULE  MONDAY AM - [place] TUESDAY - [place]   [address] [address] [place] [address] [place] [address]   [phone] / [phone] / [phone]       WEDNESDAY - [place] FRIDAY AM - [facility]   [address]   [place] [address] [place] [address]   [phone] / [phone]       FRIDAY PM - [place] SCHEDULE SURGERY: [phone]   [address] BILLING QUESTIONS: [phone]   [place] [address] AFTER HOURS: [phone]   [phone] / [phone] APPOINTMENTS: [phone]     Consumer Price Line (CPL) [phone]     FOLLOW UP:  As needed      PLANTAR FASCIITIS  Plantar fasciitis is often referred to as heel spurs or heel pain. Plantar fasciitis is a very common problem that affects people of all foot shapes, age, weight and activity level. Pain may be in the arch or on the weight-bearing surface of the heel. The pain may come on without injury or identifiable cause. Pain is generally present when first getting out of bed in the morning or up from a seated break.     CAUSES  The plantar fascia is a dense fibrous band of tissue that stretches across the bottom surface of the foot. The fascia helps support the foot muscles and arch. Plantar fasciitis is thought to be caused by mechanical strain or overload. Frequent walking without shoes or wearing unsupportive shoes is thought to cause structural overload and ultimately inflammation of the plantar fascia. Some people have heel spurs that can be seen on x-ray. The heel spur is actually a minor component of plantar fascitis and is largely ignored.       SELF TREATMENT   The easiest solution is to stop walking around your home without shoes. Plantar fasciitis is largely a shoe problem. Shoes are either not being worn often enough or your current  shoes are inadequate for your weight, foot structure or activity level. The majority of shoes on the market today are not sufficient to resist development of plantar fasciitis or to promote healing. Assume that your current shoes are inadequate and will need to be replaced. Even high quality shoes wear out with 6 months to one year of frequent use. Weight loss is another option. Losing ten pounds in the next two months may be enough to resolve the problem. Ice applied to the area of pain two to three times per day for ten minutes each session can be very helpful. Warm foot soaks in epsom salts can also relieve pain. This should continue until the problem resolves. Achilles tendon stretching is essential. Stretch multiple times daily to promote healing and to prevent recurrence in the future. Over all stretching of the body is helpful as well such as the calves, thighs and lower back. Normally when one area of the body is tight, other areas are too. Gentle Yoga can be good for this.     Over the counter topical anti inflammatories can be helpful such as biofreeze, bengay, salon pas, ect...  Oral ibuprofen or aleve is recommended as well to try to calm down inflammation.     Night splints can be helpful to gradually stretch the foot at night as a lot of pain is when you get up in the morning. Taking a towel or thera band and stretching the foot back multiple times before you get ou of bed can be beneficial as well.     MEDICAL TREATMENT  Medical treatments often include custom arch supports, cortisone injections, physical therapy, splints to be worn in bed, prescription medications and surgery. The home treatments listed above will be necessary regardless of these advanced medical treatments. Surgery is rarely needed but is very helpful in selected cases.     PROGNOSIS  Plantar fasciitis can last from one day to a lifetime. Some people get intermittent fascitis that is very short-lived. Others suffer daily for years.  Excessive body weight, frequent bare foot walking, long hours on the feet, inadequate shoes, predisposing foot structures and excessive activity such as running are all potential issues that lead to chronic and/or recurring plantar fascitis. Having plantar fasciitis means that you are forever prone to this problem and will require modification of some of the above factors. Most people seek treatment within one to four months. Healing usually requires a similar one to four month time frame. Healing time is relative to the amount of effort spent treating the problem.   Plantar fasciitis is highly recurrent. Risk factors often continue, including return to bare foot walking, inadequate shoes, excessive body weight, excessive activities, etc. Your life style and foot structure may predispose you to recurrent plantar fasciitis. A daily prevention regimen can be very helpful. Ongoing use of shoe inserts, careful attention to appropriate shoes, daily Achilles stretching, etc. may prevent recurrence. Prompt attention at the earliest warning signs of heel pain can resolve the problem in as short as a few days.     EXERCISES  Stair Exercise: Step on the stairs with the ball of your foot and hold your position for at least 15 seconds, then slowly step down with the heels of your foot. You can do this daily and as often as you want.   Picking the Towel: Sit comfortably and then pick the towel up with your toes. You can use any object other than a towel as long as the material can be soft and you can pick it up with your toes.  Rolling the Bottle: Use a small ball or frozen water bottle and then roll it around with your foot.   Flex the Toes: Sit comfortably and then flex your toes by pointing it towards the floor or towards your body. This will relax and flex your foot and exercise your plantar fascia, the calf, and the Achilles tendon. The inability of the foot to stretch often causes the bunching up of the plantar fascia area  leading to the pain.  Calf/Achilles Stretching: Lay on you back and raise one foot, then point your toes towards the floor. See photo below:               Hold each stretch for 10 seconds. Stretch 10 times per set, three sets per day. Morning, afternoon and evening. If your heel pain is very severe in the morning, consider doing the first set of stretches before you get out of bed.      OVER THE COUNTER INSERT RECOMMENDATIONS  SuperFeet   Sofsole Fit Spenco   Power Step   Walk-Fit Arch Cradles     Most of these can be found at your local [employer], [employer], or online.  **A good high quality over the counter insert should cost around $40-$50      [email or web address] LOCATIONS  [place]  [address] [place] [address]  [phone]   [address]  [phone] [place]  [address] [facility]  [phone]   [place]  [address] [facility] [address]  [phone]   [place]  [address]  [phone] [place] [address]  [phone]   [place]  [address]  [phone]   [place]  [address] [place] [address]  [phone] [place]  [address]  [phone]               BODY WEIGHT AND YOUR FEET  The following information is included in the after visit summary for all patients. Body weight can be a sensitive issue to discuss in clinic, but we think the following information is very important. Although we focus on the feet and ankles, we do support the overall health of our patients.     Many things can cause foot and ankle problems. Foot structure, activity level, foot mechanics and injuries are common causes of pain. One very important issue that often goes unmentioned, is body weight. Extra weight can cause increased stress on muscles, ligaments, bones and tendons. Sometimes just a few extra pounds is all it takes to put one over her/his threshold. Without reducing that stress, it can be difficult to alleviate pain. As Foot & Ankle specialists, our job is  addressing the lower extremity problem and possible causes. Regarding extra body weight, we encourage patients to discuss diet and weight management plans with their primary care doctors. It is this team approach that gives you the best opportunity for pain relief and getting you back on your feet.      [facility] has a Comprehensive Weight Management Program. This program includes counseling, education, non-surgical and surgical approaches to weight loss. If you are interested in learning more either talk to you primary care provider or call [phone].

## 2020-09-08 ENCOUNTER — OFFICE VISIT (OUTPATIENT)
Dept: FAMILY MEDICINE | Facility: CLINIC | Age: 65
End: 2020-09-08
Payer: COMMERCIAL

## 2020-09-08 VITALS
DIASTOLIC BLOOD PRESSURE: 76 MMHG | HEART RATE: 56 BPM | BODY MASS INDEX: 32.58 KG/M2 | SYSTOLIC BLOOD PRESSURE: 118 MMHG | WEIGHT: 220 LBS | HEIGHT: 69 IN | TEMPERATURE: 97.1 F | OXYGEN SATURATION: 99 %

## 2020-09-08 DIAGNOSIS — Z79.899 CONTROLLED SUBSTANCE AGREEMENT SIGNED: ICD-10-CM

## 2020-09-08 DIAGNOSIS — G25.81 RESTLESS LEGS SYNDROME: Primary | ICD-10-CM

## 2020-09-08 DIAGNOSIS — F41.0 ANXIETY ATTACK: ICD-10-CM

## 2020-09-08 DIAGNOSIS — R35.1 NOCTURIA: ICD-10-CM

## 2020-09-08 PROCEDURE — 99214 OFFICE O/P EST MOD 30 MIN: CPT | Performed by: PHYSICIAN ASSISTANT

## 2020-09-08 RX ORDER — DIAZEPAM 5 MG
5 TABLET ORAL DAILY PRN
Qty: 30 TABLET | Refills: 0 | Status: SHIPPED | OUTPATIENT
Start: 2020-09-08 | End: 2020-12-18

## 2020-09-08 ASSESSMENT — ANXIETY QUESTIONNAIRES
3. WORRYING TOO MUCH ABOUT DIFFERENT THINGS: NOT AT ALL
IF YOU CHECKED OFF ANY PROBLEMS ON THIS QUESTIONNAIRE, HOW DIFFICULT HAVE THESE PROBLEMS MADE IT FOR YOU TO DO YOUR WORK, TAKE CARE OF THINGS AT HOME, OR GET ALONG WITH OTHER PEOPLE: NOT DIFFICULT AT ALL
2. NOT BEING ABLE TO STOP OR CONTROL WORRYING: NOT AT ALL
GAD7 TOTAL SCORE: 0
6. BECOMING EASILY ANNOYED OR IRRITABLE: NOT AT ALL
5. BEING SO RESTLESS THAT IT IS HARD TO SIT STILL: NOT AT ALL
1. FEELING NERVOUS, ANXIOUS, OR ON EDGE: NOT AT ALL
7. FEELING AFRAID AS IF SOMETHING AWFUL MIGHT HAPPEN: NOT AT ALL

## 2020-09-08 ASSESSMENT — MIFFLIN-ST. JEOR: SCORE: 1783.29

## 2020-09-08 ASSESSMENT — PATIENT HEALTH QUESTIONNAIRE - PHQ9
SUM OF ALL RESPONSES TO PHQ QUESTIONS 1-9: 2
5. POOR APPETITE OR OVEREATING: NOT AT ALL

## 2020-09-08 NOTE — PROGRESS NOTES
SUBJECTIVE:   Nick Villalobos is a 64 year old male who presents to clinic today for the following health issues:      Discuss diazepam valium for anxiety and restless leg syndrome      Anxiety & RLS  Rare anxiety attacks.  When he has them they are quite severe.  Historically has used valium very sparingly. Additionally has RLS that is intermittent but very bothersome.  Valium has worked well for this.  Does not have on a daily basis.  Has a new leg massaging machine that has helped his symptoms significantly.   Ferritin level normal 2018.  Last RX for Valium was in 2018.  30 tablets historically has lasted 3-4 months.      How are you doing with your anxiety since your last visit? Worsened     Are you having other symptoms that might be associated with anxiety? No    Have you had a significant life event? No     Are you feeling depressed? No    Do you have any concerns with your use of alcohol or other drugs? No      Nocturia  Last 2-3 months has been getting up more to go to the bathroom.  No pain, no hematuria.  Drinks a lot of water and not taking a break before bed.    Social History     Tobacco Use     Smoking status: Former Smoker     Years: 30.00     Last attempt to quit: 2001     Years since quittin.7     Smokeless tobacco: Never Used   Substance Use Topics     Alcohol use: Yes     Comment: 2x per month     Drug use: No     ANLGE-7 SCORE 2020   Total Score 0     PHQ 2020   PHQ-9 Total Score 2   Q9: Thoughts of better off dead/self-harm past 2 weeks Not at all     Last PHQ-9 2020   1.  Little interest or pleasure in doing things 0   2.  Feeling down, depressed, or hopeless 0   3.  Trouble falling or staying asleep, or sleeping too much 1   4.  Feeling tired or having little energy 1   5.  Poor appetite or overeating 0   6.  Feeling bad about yourself 0   7.  Trouble concentrating 0   8.  Moving slowly or restless 0   Q9: Thoughts of better off dead/self-harm past 2 weeks 0   PHQ-9  Total Score 2   Difficulty at work, home, or with people Not difficult at all     ANGLE-7  2020   1. Feeling nervous, anxious, or on edge 0   2. Not being able to stop or control worrying 0   3. Worrying too much about different things 0   4. Trouble relaxing 0   5. Being so restless that it is hard to sit still 0   6. Becoming easily annoyed or irritable 0   7. Feeling afraid, as if something awful might happen 0   ANGLE-7 Total Score 0   If you checked any problems, how difficult have they made it for you to do your work, take care of things at home, or get along with other people? Not difficult at all           Problem list and histories reviewed & adjusted, as indicated.  Additional history: as documented    Patient Active Problem List   Diagnosis     Family history of malignant neoplasm of prostate     Esophageal reflux     Advanced directives, counseling/discussion     Colon polyps     Hyperlipidemia LDL goal <100     Restless legs syndrome     Past Surgical History:   Procedure Laterality Date     HC COLONOSCOPY THRU STOMA, DIAGNOSTIC  6/15/07    Hyperplastic Polyp.  Needs repeat in 5 years     SURGICAL HISTORY OF -       tendon repair in right hand       Social History     Tobacco Use     Smoking status: Former Smoker     Years: 30.00     Last attempt to quit: 2001     Years since quittin.7     Smokeless tobacco: Never Used   Substance Use Topics     Alcohol use: Yes     Comment: 2x per month     Family History   Problem Relation Age of Onset     Prostate Cancer Father      Cancer - colorectal Father      C.A.D. Father          at 67, presumed MI     Hypertension Father      Cerebrovascular Disease Mother         at 64     Skin Cancer Paternal Grandmother          Current Outpatient Medications   Medication Sig Dispense Refill     diazepam (VALIUM) 5 MG tablet Take 1 tablet (5 mg) by mouth daily as needed for anxiety (OR restless leg flare) 30 tablet 0     order for DME Night splint 1 Device 1  "    No Known Allergies    Reviewed and updated as needed this visit by clinical staff  Tobacco  Allergies  Meds  Problems  Med Hx  Surg Hx  Fam Hx  Soc Hx        Reviewed and updated as needed this visit by Provider  Tobacco  Allergies  Meds  Problems  Med Hx  Surg Hx  Fam Hx         ROS:  Constitutional, HEENT, cardiovascular, pulmonary, GI, , musculoskeletal, neuro, skin, endocrine and psych systems are negative, except as otherwise noted.      OBJECTIVE:   /76 (BP Location: Right arm, Cuff Size: Adult Regular)   Pulse 56   Temp 97.1  F (36.2  C) (Tympanic)   Ht 1.753 m (5' 9\")   Wt 99.8 kg (220 lb)   SpO2 99%   BMI 32.49 kg/m   Body mass index is 32.49 kg/m .    GENERAL: healthy, alert and no distress  EYES: Eyes grossly normal to inspection, PERRL and conjunctivae and sclerae normal  RESP: lungs clear to auscultation - no rales, rhonchi or wheezes  CV: regular rate and rhythm, normal S1 S2, no S3 or S4, no murmur, click or rub, no peripheral edema and peripheral pulses strong  MS: no gross musculoskeletal defects noted, no edema  SKIN: no suspicious lesions or rashes  NEURO: Normal strength and tone, mentation intact and speech normal  PSYCH: mentation appears normal, affect normal/bright    Diagnostic Test Results:  none   ASSESSMENT/PLAN:   Nick was seen today for medication request.    Diagnoses and all orders for this visit:    Restless legs syndrome  Anxiety attack  Controlled substance agreement signed  Patient historically has very intermittent RLS severe symptoms as well as anxiety attacks.  He does not have any concerning behaviors for misuse of benzodiazepine and I feel comfortable prescribing 30 tablets of 5 mg diazepam that should last him at least 3 months.  Patient signed controlled substance agreement today and understands the parameters by which define this agreement.  Plan for follow-up in 6 months or if this coincides with his annual physical we can do it at that " time as well.  -     diazepam (VALIUM) 5 MG tablet; Take 1 tablet (5 mg) by mouth daily as needed for anxiety (OR restless leg flare)    Nocturia  Patient will do a trial of avoiding liquids after 5 PM to see if this improves his nocturia.  If not improved we can discuss options for suspected BPH.  Patient voiced understanding and agreement and will keep me apprised of his progress.    Return in about 6 months (around 3/8/2021) for med check.      Rose Webb PA-C  [facility] [place]

## 2020-09-08 NOTE — LETTER
[facility] [place]  09/08/20    Patient: Nick Villalobos  YOB: 1955  Medical Record Number: [id number]  CSN: [id number]                                                                              Non-opioid Controlled Substance Agreement    I understand that my care provider has prescribed a controlled substance to help manage my condition(s). I am taking this medicine to help me function or work. I know this is strong medicine, and that it can cause serious side effects. Controlled substances can be sedating, addicting and may cause a dependency on the drug. They can affect my ability to drive or think, and cause depression. They need to be taken exactly as prescribed. Combining controlled substances with certain medicines or chemicals (such as cocaine, sedatives and tranquilizers, sleeping pills, meth) can be dangerous or even fatal. Also, if I stop controlled substances suddenly, I may have severe withdrawal symptoms.  If not helpful, I may be asked to stop them.    The risks, benefits, and side effects of these medicine(s) were explained to me. I agree that:    1. I will take part in other treatments as advised by my care team. This may be psychiatry or counseling, physical therapy, behavioral therapy, group treatment or a referral to a pain clinic. I will reduce or stop my medicine when my care team tells me to do so.  2. I will take my medicines as prescribed. I will not change the dose or schedule unless my care team tells me to. There will be no refills if I  run out early.   I may be contactedwithout warning and asked to complete a urine drug test or pill count at any time.   3. I will keep all my appointments, and understand this is part of the monitoring of controlled substances. My care team may require an office visit for EVERY controlled substance refill. If I miss appointments or don t follow instructions, my care team may stop my medicine.  4. I will not ask other  providers to prescribe controlled substances, and I will not accept controlled substances from other people. If I need another prescribed controlled substance for a new reason, I will tell my care team within 1 business day.  5. I will use one pharmacy to fill all of my controlled substance prescriptions, and it is up to me to make sure that I do not run out of my medicines on weekends or holidays. If my care team is willing to refill my controlled substance prescription without a visit, I must request refills only during office hours, refills may take up to 3 days to process, and it may take up to 5 to 7 days for my medicine to be mailed and ready at my pharmacy. Prescriptions will not be mailed anywhere except my pharmacy.    6. I am responsible for my prescriptions. If the medicine/prescription is lost or stolen, it will not be replaced. I also agree not to share controlled substance medicines with anyone.              [facility] [place]  09/08/20  Patient:  Nick Villalobos  YOB: 1955  Medical Record Number: [id number]  CSN: [id number]    7. I agree to not use ANY illegal or recreational drugs. This includes marijuana, cocaine, bath salts or other drugs. I agree not to use alcohol unless my care team says I may. I agree to give urine samples whenever asked. If I don t give a urine sample, the care team may stop my medicine.    8. If I enroll in the [place] Medical Marijuana program, I will tell my care team. I will also sign an agreement to share my medical records with my care team.    9. I will bring in my list of medicines (or my medicine bottles) each time I come to the clinic.   10. I will tell my care team right away if I become pregnant or have a new medical problem treated outside of my regular clinic.  11. I understand that this medicine can affect my thinking and judgment. It may be unsafe for me to drive, use machinery and do dangerous tasks. I will not do any of these things  until I know how the medicine affects me. If my dose changes, I will wait to see how it affects me. I will contact my care team if I have concerns about medicine side effects.    I understand that if I do not follow any of the conditions above, my prescriptions or treatment may be stopped.      I agree that my provider, clinic care team, and pharmacy may work with any city, state or federal law enforcement agency that investigates the misuse, sale, or other diversion of my controlled medicine. I will allow my provider to discuss my care with or share a copy of this agreement with any other treating provider, pharmacy or emergency room where I receive care. I agree to give up (waive) any right of privacy or confidentiality with respect to these consents.   I have read this agreement and have asked questions about anything I did not understand.    ____________________________________________________    ____________  ________  Patient signature                                                         Date      Time    ____________________________________________________     ____________  ________  Witness                                                          Date      Time    ____________________________________________________  Provider signature

## 2020-09-09 ASSESSMENT — ANXIETY QUESTIONNAIRES: GAD7 TOTAL SCORE: 0

## 2020-12-18 ENCOUNTER — TELEPHONE (OUTPATIENT)
Dept: FAMILY MEDICINE | Facility: CLINIC | Age: 65
End: 2020-12-18

## 2020-12-18 DIAGNOSIS — Z79.899 CONTROLLED SUBSTANCE AGREEMENT SIGNED: ICD-10-CM

## 2020-12-18 DIAGNOSIS — F41.0 ANXIETY ATTACK: ICD-10-CM

## 2020-12-18 DIAGNOSIS — G25.81 RESTLESS LEGS SYNDROME: ICD-10-CM

## 2020-12-18 NOTE — TELEPHONE ENCOUNTER
.Reason for Call:  Medication or medication refill:    Do you use a [facility]?  Name of the pharmacy and phone number for the current request:  [facility] - [phone]    Name of the medication requested:   diazepam (VALIUM) 5 MG tablet 5 mg, DAILY PRN         Other request: left message about a symptom;     Can we leave a detailed message on this number? YES    Phone number patient can be reached at: Cell number on file:    Telephone Information:   Mobile [phone]       Best Time: anytime    Call taken on 12/18/2020 at 11:18 AM by Adeline Craft

## 2020-12-18 NOTE — TELEPHONE ENCOUNTER
.Reason for call:  Patient reporting a symptom    Symptom or request: lesion/like a bite appearance, burns,  on side of body    Duration (how long have symptoms been present): unknown    Have you been treated for this before? No    Additional comments: Patient states this is on his side, used some alcohol on it, and put a band aid on it , when removed band aid it ripped skin a little; made a first available appointment / virtual all that was available / 12-29-20    Phone Number patient can be reached at:  Cell number on file:    Telephone Information:   Mobile [phone]       Best Time:  anytime    Can we leave a detailed message on this number:  YES    Call taken on 12/18/2020 at 11:25 AM by Adeline Craft

## 2020-12-18 NOTE — TELEPHONE ENCOUNTER
Called patient @ # below -     Rash  Onset: 1 Day    Description:   Location: R side above waist  Character: almost looks like someone stuck a needle in x 1  Itching (Pruritis): YES    Progression of Symptoms:  same and constant    Accompanying Signs & Symptoms:  Fever: no   Body aches or joint pain: no   Sore throat symptoms: no   Recent cold symptoms: head cold 3 days ago but nothing today    History:   Previous similar rash: no     Precipitating factors:   Exposure to similar rash: no   New exposures: None   Recent travel: no     Alleviating factors:  Nothing    Therapies Tried and outcome: Nothing     DENIES: CP, SOB, Difficulty Breathing, Dizziness/Lightheadedness, Numbness/Tingling, HA, Vision/Hearing Changes, N/V, Palpitations    Advised of home care measures.   Advised patient that if new or worsening symptoms appear (reviewed new & worsening symptoms) to call the clinic or be seen in the the ER  Patient stated an understanding and agreed with plan.      Madalyn Wilkinson RN  [facility]

## 2020-12-21 RX ORDER — DIAZEPAM 5 MG
5 TABLET ORAL DAILY PRN
Qty: 30 TABLET | Refills: 0 | Status: SHIPPED | OUTPATIENT
Start: 2020-12-21 | End: 2021-05-06

## 2021-05-05 ENCOUNTER — NURSE TRIAGE (OUTPATIENT)
Dept: NURSING | Facility: CLINIC | Age: 66
End: 2021-05-05

## 2021-05-05 ENCOUNTER — TELEPHONE (OUTPATIENT)
Dept: NURSING | Facility: CLINIC | Age: 66
End: 2021-05-05

## 2021-05-05 DIAGNOSIS — Z79.899 CONTROLLED SUBSTANCE AGREEMENT SIGNED: ICD-10-CM

## 2021-05-05 DIAGNOSIS — G25.81 RESTLESS LEGS SYNDROME: ICD-10-CM

## 2021-05-05 DIAGNOSIS — F41.0 ANXIETY ATTACK: ICD-10-CM

## 2021-05-05 NOTE — TELEPHONE ENCOUNTER
This medication needs a med check due to the controlled nature of the medication (have to have a visit every 6 months).  Please assist in scheduling a video visit for medication check.  If needed, I can send over 5 tablets to get him to this appointment otherwise I can simply fill it at the appointment date.    Please route back if he needs short-term Rx sent prior to appointment.      Rose Webb MBA, MS, PA-C

## 2021-05-05 NOTE — TELEPHONE ENCOUNTER
Medication Question or Refill    Who is calling: pt    What medication are you calling about (include dose and sig)?:   diazepam (VALIUM) 5 MG tablet    Controlled Substance Agreement on file: No    Who prescribed the medication?: pcp    Do you need a refill? Yes    When did you use the medication last? Few days ago    Patient offered an appointment? No    Do you have any questions or concerns?  No    Requested Pharmacy: in chart    Okay to leave a detailed message?: Yes at Cell number on file:    Telephone Information:   Mobile [phone]

## 2021-05-05 NOTE — TELEPHONE ENCOUNTER
Patient called back and was scheduled for a telephone appointment on 5/11/2021. He would like the 5 pills sent to his pharmacy as soon as possible.

## 2021-05-05 NOTE — TELEPHONE ENCOUNTER
Routing to north/south to assist in scheduling      Madalyn Wilkinson RN  [facility]

## 2021-05-05 NOTE — TELEPHONE ENCOUNTER
Outpatient Medication Detail   Disp Refills Start End ROMY   diazepam (VALIUM) 5 MG tablet 30 tablet 0 12/21/2020  No   Sig - Route: Take 1 tablet (5 mg) by mouth daily as needed for anxiety (OR restless leg flare) - Oral     Problem List Complete:  Yes    Last Office Visit with [facility] [place] care provider: 9/8/2020    Future Office visit:     Controlled substance agreement:   Encounter-Level CSA:    There are no encounter-level csa.     Patient-Level CSA:    Controlled Substance Agreement - Non - Opioid - Scan on 9/16/2020 11:52 AM: NON-OPIOID CONTROLLED SUBSTANCE AGREEMENT         Last Urine Drug Screen: No results found for: CDAUT, No results found for: COMDAT, No results found for: THC13, PCP13, COC13, MAMP13, OPI13, AMP13, BZO13, TCA13, MTD13, BAR13, OXY13, PPX13, BUP13     Processing:  Rx to be electronically transmitted to pharmacy by provider     [email or web address]      Routing refill request to provider for review/approval because:  Drug not on the [facility] refill protocol         Madalyn Wilkinson RN  [facility]

## 2021-05-06 RX ORDER — DIAZEPAM 5 MG
5 TABLET ORAL DAILY PRN
Qty: 5 TABLET | Refills: 0 | Status: SHIPPED | OUTPATIENT
Start: 2021-05-06 | End: 2021-05-11

## 2021-05-06 NOTE — TELEPHONE ENCOUNTER
See refill encounter    Rosales SAGASTUME RN   [facility] [place] Triage

## 2021-05-06 NOTE — TELEPHONE ENCOUNTER
Future Appointments   Date Time Provider Department Center   5/11/2021 10:20 AM Rose Webb PA-C [facility]     Pt would like call back once filled.    Routing to PCP to review and advise or sign if agree.    Rosales SAGASTUME RN   [facility] [place] Triage

## 2021-05-06 NOTE — TELEPHONE ENCOUNTER
Nick calls and says that he needs his Diazepam and has not taken this medication for a few days. RN then checked EPIC and saw that this medication is pending and has not been refilled yet. Pt. Says that he will call his clinic in the am to get the medication filled. Pt. Says that he wants this nurse to leave this message for his Dr. RN then left this message in Epic, as requested. COVID 19 Nurse Triage Plan/Patient Instructions    Please be aware that novel coronavirus (COVID-19) may be circulating in the community. If you develop symptoms such as fever, cough, or SOB or if you have concerns about the presence of another infection including coronavirus (COVID-19), please contact your health care provider or visit [email or web address].     Disposition/Instructions    Virtual Visit with provider recommended. Reference Visit Selection Guide.    Thank you for taking steps to prevent the spread of this virus.  o Limit your contact with others.  o Wear a simple mask to cover your cough.  o Wash your hands well and often.    Resources    [facility]: About COVID-19: www.[email or web address].[email or web address]/covid19/    CDC: What to Do If You're Sick: www.cdc.gov/coronavirus/2019-ncov/about/steps-when-sick.html    CDC: Ending Home Isolation: www.cdc.gov/coronavirus/2019-ncov/hcp/disposition-in-home-patients.html     CDC: Caring for Someone: www.cdc.gov/coronavirus/2019-ncov/if-you-are-sick/care-for-someone.html     [facility]: Interim Guidance for Hospital Discharge to Home: www.health.[facility]/diseases/coronavirus/hcp/hospdischarge.pdf    [facility] clinical trials (COVID-19 research studies): clinicalaffairs.[facility]/umn-clinical-trials     Below are the COVID-19 hotlines at the [place] Department of Health ([facility]). Interpreters are available.   o For health questions: Call [phone] or 1-139.471.1687 (7 a.m. to 7 p.m.)  o For questions about schools and childcare: Call 902-276-1114 or 1-636.464.8639 (7 a.m. to  "7 p.m.)                     Reason for Disposition    Caller requesting a NON-URGENT new prescription or refill and triager unable to refill per unit policy    Additional Information    Negative: [1] Caller is not with the adult (patient) AND [2] reporting urgent symptoms    Negative: Lab result questions    Medication questions    Negative: Drug overdose and nurse unable to answer question    Negative: Caller requesting information not related to medicine    Negative: Caller requesting a prescription for Strep throat and has a positive culture result    Negative: Rash while taking a medication or within 3 days of stopping it    Negative: Immunization reaction suspected    Negative: [1] Asthma AND [2] having symptoms of asthma (cough, wheezing, etc)    Negative: MORE THAN A DOUBLE DOSE of a prescription or over-the-counter (OTC) drug    Negative: [1] DOUBLE DOSE (an extra dose or lesser amount) of over-the-counter (OTC) drug AND [2] any symptoms (e.g., dizziness, nausea, pain, sleepiness)    Negative: [1] DOUBLE DOSE (an extra dose or lesser amount) of prescription drug AND [2] any symptoms (e.g., dizziness, nausea, pain, sleepiness)    Negative: Took another person's prescription drug    Negative: [1] DOUBLE DOSE (an extra dose or lesser amount) of prescription drug AND [2] NO symptoms (Exception: a double dose of antibiotics)    Negative: Diabetes drug error or overdose (e.g., insulin or extra dose)    Negative: [1] Request for URGENT new prescription or refill of \"essential\" medication (i.e., likelihood of harm to patient if not taken) AND [2] triager unable to fill per unit policy    Negative: [1] Prescription not at pharmacy AND [2] was prescribed today by PCP    Negative: Pharmacy calling with prescription questions and triager unable to answer question    Negative: Caller has urgent medication question about med that PCP prescribed and triager unable to answer question    Negative: Caller has NON-URGENT " medication question about med that PCP prescribed and triager unable to answer question    Protocols used: MEDICATION QUESTION CALL-A-AH, INFORMATION ONLY CALL-A-AH

## 2021-05-06 NOTE — TELEPHONE ENCOUNTER
Clinic Action Needed:Yes  Reason for Call: Nick calls and says that he needs his Diazepam and has not taken this medication for a few days. [facility] and saw that this medication is pending and has not been refilled yet. Pt. Says that he will call his clinic in the am to get the medication filled. Pt. Says that he wants this nurse to leave this message for his Dr. RN then left this message in Epic, as requested.  Routed to:  Triage  Patrizia Larson RN   [facility]  [phone]

## 2021-05-11 ENCOUNTER — VIRTUAL VISIT (OUTPATIENT)
Dept: FAMILY MEDICINE | Facility: CLINIC | Age: 66
End: 2021-05-11
Payer: COMMERCIAL

## 2021-05-11 DIAGNOSIS — Z79.899 CONTROLLED SUBSTANCE AGREEMENT SIGNED: ICD-10-CM

## 2021-05-11 DIAGNOSIS — G25.81 RESTLESS LEGS SYNDROME: Primary | ICD-10-CM

## 2021-05-11 DIAGNOSIS — Z28.21 COVID-19 VIRUS VACCINATION DECLINED: ICD-10-CM

## 2021-05-11 DIAGNOSIS — F41.0 ANXIETY ATTACK: ICD-10-CM

## 2021-05-11 PROCEDURE — 99214 OFFICE O/P EST MOD 30 MIN: CPT | Mod: TEL | Performed by: PHYSICIAN ASSISTANT

## 2021-05-11 RX ORDER — DIAZEPAM 5 MG
5 TABLET ORAL DAILY PRN
Qty: 30 TABLET | Refills: 0 | Status: SHIPPED | OUTPATIENT
Start: 2021-05-11 | End: 2022-03-18

## 2021-05-11 ASSESSMENT — ANXIETY QUESTIONNAIRES
IF YOU CHECKED OFF ANY PROBLEMS ON THIS QUESTIONNAIRE, HOW DIFFICULT HAVE THESE PROBLEMS MADE IT FOR YOU TO DO YOUR WORK, TAKE CARE OF THINGS AT HOME, OR GET ALONG WITH OTHER PEOPLE: NOT DIFFICULT AT ALL
GAD7 TOTAL SCORE: 2
5. BEING SO RESTLESS THAT IT IS HARD TO SIT STILL: SEVERAL DAYS
3. WORRYING TOO MUCH ABOUT DIFFERENT THINGS: NOT AT ALL
1. FEELING NERVOUS, ANXIOUS, OR ON EDGE: SEVERAL DAYS
2. NOT BEING ABLE TO STOP OR CONTROL WORRYING: NOT AT ALL
7. FEELING AFRAID AS IF SOMETHING AWFUL MIGHT HAPPEN: NOT AT ALL
6. BECOMING EASILY ANNOYED OR IRRITABLE: NOT AT ALL

## 2021-05-11 ASSESSMENT — PATIENT HEALTH QUESTIONNAIRE - PHQ9
SUM OF ALL RESPONSES TO PHQ QUESTIONS 1-9: 1
5. POOR APPETITE OR OVEREATING: NOT AT ALL

## 2021-05-11 NOTE — PROGRESS NOTES
"Conner is a 65 year old who is being evaluated via a billable telephone visit.      What phone number would you like to be contacted at? [phone]    How would you like to obtain your AVS? Mail a copy    Assessment & Plan     Restless legs syndrome  Anxiety attack  Controlled substance agreement signed - Diazepam 5 mg #30 every 3 months for severe RLS flares and anxiety attacks  Well controlled.  Continue current regimen.  - diazepam (VALIUM) 5 MG tablet  Dispense: 5 tablet; Refill: 0    COVID-19 virus vaccination declined  Lengthy discussion.  Pt very hesitant due to the \"government involvement\", \"rushed nature of vaccination\", and unknown long term effects.  Pt will continue to do own research.     BMI:   Estimated body mass index is 32.49 kg/m  as calculated from the following:    Height as of 9/8/20: 1.753 m (5' 9\").    Weight as of 9/8/20: 99.8 kg (220 lb).   Weight management plan: Patient was referred to their PCP to discuss a diet and exercise plan.      Return in about 6 months (around 11/11/2021) for Physical Exam, Fasting labs, Medication recheck.    Rose Webb PA-C  [facility] [place] [facility]   Conner is a 65 year old who presents for the following health issues     HPI     Anxiety & RLS  Rare anxiety attacks.  When he has them they are quite severe.  Historically has used valium very sparingly. Additionally has RLS that is intermittent but very bothersome.  Valium has worked well for this.  Does not have on a daily basis.  Has a new leg massaging machine that has helped his symptoms significantly.   Ferritin level normal 2018.  Last RX for Valium was in 9/8/2020 for #30.  30 tablets historically has lasted 3-4 months.      Review of Systems   Constitutional, HEENT, cardiovascular, pulmonary, GI, , musculoskeletal, neuro, skin, endocrine and psych systems are negative, except as otherwise noted.      Objective           Vitals:  No vitals were obtained today due to " virtual visit.    Physical Exam   healthy, alert and no distress  PSYCH: Alert and oriented times 3; coherent speech, normal   rate and volume, able to articulate logical thoughts, able   to abstract reason, no tangential thoughts, no hallucinations   or delusions  His affect is normal  RESP: No cough, no audible wheezing, able to talk in full sentences  Remainder of exam unable to be completed due to telephone visits    Ferritin   Date Value Ref Range Status   06/22/2018 104 26 - 388 ng/mL Final   {            Phone call duration: 8 minutes

## 2021-05-11 NOTE — PROGRESS NOTES
"Conner is a 65 year old who is being evaluated via a billable video visit.      How would you like to obtain your AVS? Mail a copy  If the video visit is dropped, the invitation should be resent by: Text to cell phone: [phone]  Will anyone else be joining your video visit? No  {If patient encounters technical issues they should call [phone] :908305}    Video Start Time: {video visit start/end time for provider to select:255502}    Assessment & Plan     Restless legs syndrome  ***  - diazepam (VALIUM) 5 MG tablet  Dispense: 5 tablet; Refill: 0    Anxiety attack  ***  - diazepam (VALIUM) 5 MG tablet  Dispense: 5 tablet; Refill: 0    Controlled substance agreement signed - Diazepam 5 mg #30 every 3 months for severe RLS flares and anxiety attacks  ***  - diazepam (VALIUM) 5 MG tablet  Dispense: 5 tablet; Refill: 0      {[facility] 2021 Documentation (Optional):890341}  {2021 E&M time (Optional):411090}  {Provider  Link to [facility] Help Grid :703013}     BMI:   Estimated body mass index is 32.49 kg/m  as calculated from the following:    Height as of 9/8/20: 1.753 m (5' 9\").    Weight as of 9/8/20: 99.8 kg (220 lb).   {Weight Management Plan needed for ACO:967787}    Return in about 6 months (around 11/11/2021) for Physical Exam, Fasting labs, Medication recheck.    Rose Webb PA-C  [facility]   Conner is a 65 year old who presents for the following health issues     HPI     Anxiety Follow-Up    How are you doing with your anxiety since your last visit? Improved     Are you having other symptoms that might be associated with anxiety? No    Have you had a significant life event? No     Are you feeling depressed? No    Do you have any concerns with your use of alcohol or other drugs? No     Anxiety & RLS  Rare anxiety attacks.  When he has them they are quite severe.  Historically has used valium very sparingly. Additionally has RLS that is intermittent but very bothersome.  " Valium has worked well for this.  Does not have on a daily basis.  Has a new leg massaging machine that has helped his symptoms significantly.   Ferritin level normal 2018.  Last RX for Valium was 2020.  30 tablets historically has lasted 3-4 months.      Social History     Tobacco Use     Smoking status: Former Smoker     Years: 30.00     Quit date: 2001     Years since quittin.4     Smokeless tobacco: Never Used   Substance Use Topics     Alcohol use: Yes     Comment: 2x per month     Drug use: No     ANGLE-7 SCORE 2020   Total Score 0 2     PHQ 2020   PHQ-9 Total Score 2 1   Q9: Thoughts of better off dead/self-harm past 2 weeks Not at all Not at all     Last PHQ-9 2021   1.  Little interest or pleasure in doing things 0   2.  Feeling down, depressed, or hopeless 0   3.  Trouble falling or staying asleep, or sleeping too much 1   4.  Feeling tired or having little energy 0   5.  Poor appetite or overeating 0   6.  Feeling bad about yourself 0   7.  Trouble concentrating 0   8.  Moving slowly or restless 0   Q9: Thoughts of better off dead/self-harm past 2 weeks 0   PHQ-9 Total Score 1   Difficulty at work, home, or with people Not difficult at all     ANGLE-7  2021   1. Feeling nervous, anxious, or on edge 1   2. Not being able to stop or control worrying 0   3. Worrying too much about different things 0   4. Trouble relaxing 0   5. Being so restless that it is hard to sit still 1   6. Becoming easily annoyed or irritable 0   7. Feeling afraid, as if something awful might happen 0   ANGLE-7 Total Score 2   If you checked any problems, how difficult have they made it for you to do your work, take care of things at home, or get along with other people? Not difficult at all         How many servings of fruits and vegetables do you eat daily?  0-1    On average, how many sweetened beverages do you drink each day (Examples: soda, juice, sweet tea, etc.  Do NOT count diet  "or artificially sweetened beverages)?   0    How many days per week do you exercise enough to make your heart beat faster? 3 or less    How many minutes a day do you exercise enough to make your heart beat faster? 20 - 29    How many days per week do you miss taking your medication? 0        Review of Systems   {ROS COMP (Optional):946223}      Objective           Vitals:  No vitals were obtained today due to virtual visit.    Physical Exam   {video visit exam brief selected:895048::\"GENERAL: Healthy, alert and no distress\",\"EYES: Eyes grossly normal to inspection.  No discharge or erythema, or obvious scleral/conjunctival abnormalities.\",\"RESP: No audible wheeze, cough, or visible cyanosis.  No visible retractions or increased work of breathing.  \",\"SKIN: Visible skin clear. No significant rash, abnormal pigmentation or lesions.\",\"NEURO: Cranial nerves grossly intact.  Mentation and speech appropriate for age.\",\"PSYCH: Mentation appears normal, affect normal/bright, judgement and insight intact, normal speech and appearance well-groomed.\"}    {Diagnostic Test Results (Optional):755359}    {AMBULATORY ATTESTATION (Optional):740632}        Video-Visit Details    Type of service:  Video Visit    Video End Time:{video visit start/end time for provider to select:568076}    Originating Location (pt. Location): {video visit patient location:526311::\"Home\"}    Distant Location (provider location):  [facility]     Platform used for Video Visit: {Virtual Visit Platforms:503238::\"[employer]\"}  "

## 2021-05-12 ASSESSMENT — ANXIETY QUESTIONNAIRES: GAD7 TOTAL SCORE: 2

## 2021-09-30 NOTE — LETTER
[facility]  [address] [place] [id number]                                                                                                       [phone]    April 25, 2019    Nick Villalobos  [facility] [address]      To Whom it May Concern:    The above patient is able to return to work 4/26/2019, he was seen 4/25/2019  for a medical issue. Please contact me with questions or concerns.      Sincerely,    Rose Webb PA-C   #1:

## 2022-01-04 ENCOUNTER — OFFICE VISIT (OUTPATIENT)
Dept: DERMATOLOGY | Facility: CLINIC | Age: 67
End: 2022-01-04
Payer: COMMERCIAL

## 2022-01-04 VITALS — SYSTOLIC BLOOD PRESSURE: 134 MMHG | HEART RATE: 52 BPM | OXYGEN SATURATION: 97 % | DIASTOLIC BLOOD PRESSURE: 71 MMHG

## 2022-01-04 DIAGNOSIS — L82.0 INFLAMED SEBORRHEIC KERATOSIS: ICD-10-CM

## 2022-01-04 DIAGNOSIS — D18.01 ANGIOMA OF SKIN: ICD-10-CM

## 2022-01-04 DIAGNOSIS — D23.9 DERMAL NEVUS: ICD-10-CM

## 2022-01-04 DIAGNOSIS — I78.1 TELANGIECTASIA: ICD-10-CM

## 2022-01-04 DIAGNOSIS — L81.4 LENTIGO: Primary | ICD-10-CM

## 2022-01-04 DIAGNOSIS — L82.1 SEBORRHEIC KERATOSIS: ICD-10-CM

## 2022-01-04 DIAGNOSIS — Z85.828 HISTORY OF SKIN CANCER: ICD-10-CM

## 2022-01-04 PROCEDURE — 17110 DESTRUCTION B9 LES UP TO 14: CPT | Performed by: DERMATOLOGY

## 2022-01-04 PROCEDURE — 99213 OFFICE O/P EST LOW 20 MIN: CPT | Mod: 25 | Performed by: DERMATOLOGY

## 2022-01-04 NOTE — NURSING NOTE
Chief Complaint   Patient presents with     Derm Problem     s/p Mohs and spot on scalp        Gabriela Madison on 1/4/2022 at 8:53 AM

## 2022-01-04 NOTE — PROGRESS NOTES
Nick Villalobos is an extremely pleasant 66 year old year old male patient here today for spot on scalp and redness on nose.   .   Patient states this has been present for a while.  Patient reports the following symptoms:  Rough on scalp.  .  Patient reports the following previous treatments none.  These treatments did not work.  Patient reports the following modifying factors none.  Associated symptoms: none.  Patient has no other skin complaints today.  Remainder of the HPI, Meds, PMH, Allergies, FH, and SH was reviewed in chart.      Past Medical History:   Diagnosis Date     Basal cell carcinoma      Esophageal reflux      Family history of malignant neoplasm of prostate 5/10/2007    Father at 53       Past Surgical History:   Procedure Laterality Date     SURGICAL HISTORY OF -       tendon repair in right hand     ZZHC COLONOSCOPY THRU STOMA, DIAGNOSTIC  6/15/07    Hyperplastic Polyp.  Needs repeat in 5 years        Family History   Problem Relation Age of Onset     Prostate Cancer Father      Cancer - colorectal Father      C.A.D. Father          at 67, presumed MI     Hypertension Father      Cerebrovascular Disease Mother         at 64     Skin Cancer Paternal Grandmother        Social History     Socioeconomic History     Marital status:      Spouse name: Cheryl     Number of children: 3     Years of education: Not on file     Highest education level: Not on file   Occupational History     Not on file   Tobacco Use     Smoking status: Former Smoker     Years: 30.00     Quit date: 2001     Years since quittin.0     Smokeless tobacco: Never Used   Substance and Sexual Activity     Alcohol use: Yes     Comment: 2x per month     Drug use: No     Sexual activity: Yes     Partners: Female   Other Topics Concern      Service Not Asked     Blood Transfusions No     Caffeine Concern No     Occupational Exposure Not Asked     Hobby Hazards Not Asked     Sleep Concern No     Stress  Concern Not Asked     Weight Concern Not Asked     Special Diet Not Asked     Back Care Not Asked     Exercise Yes     Comment: Runs 30-40 miles a month     Bike Helmet Not Asked     Seat Belt Yes     Self-Exams Not Asked     Parent/sibling w/ CABG, MI or angioplasty before 65F 55M? Yes   Social History Narrative    [place] [employer]     Social Determinants of Health     Financial Resource Strain: Not on file   Food Insecurity: Not on file   Transportation Needs: Not on file   Physical Activity: Not on file   Stress: Not on file   Social Connections: Not on file   Intimate Partner Violence: Not on file   Housing Stability: Not on file       Outpatient Encounter Medications as of 1/4/2022   Medication Sig Dispense Refill     diazepam (VALIUM) 5 MG tablet Take 1 tablet (5 mg) by mouth daily as needed for anxiety (OR restless leg flare) 30 tablet 0     order for DME Night splint (Patient not taking: Reported on 5/11/2021) 1 Device 1     No facility-administered encounter medications on file as of 1/4/2022.             O:   NAD, WDWN, Alert & Oriented, Mood & Affect wnl, Vitals stable   Here today alone   /71 (BP Location: Left arm, Patient Position: Sitting, Cuff Size: Adult Large)   Pulse 52   SpO2 97%    General appearance normal   Vitals stable   Alert, oriented and in no acute distress     Scalp inflamed seborrheic keratosis   Telangiectasia on nose    Stuck on papules and brown macules on trunk and ext   Red papules on trunk  Flesh colored papules on trunk     The remainder of expanded problem focused exam was normal; the following areas were examined:  scalp/hair, conjunctiva/lids, face, neck, lips, chest, digits/nails, RUE, LUE.      Eyes: Conjunctivae/lids:Normal     ENT: Lips, buccal mucosa, tongue: normal    MSK:Normal    Cardiovascular: peripheral edema none    Pulm: Breathing Normal    Lymph Nodes: No Head and Neck Lymphadenopathy     Neuro/Psych: Orientation:Alert and Orientedx3 ; Mood/Affect:normal        A/P:  1. Seborrheic keratosis, lentigo, angioma, dermal nevus  2. Hx of non-melanoma skin cancer  ipl discussed with patient   Schedule  3. Inflamed seborrheic keratosis scalp  LN2:  Treated with LN2 for 5s for 1-2 cycles. Warned risks of blistering, pain, pigment change, scarring, and incomplete resolution.  Advised patient to return if lesions do not completely resolve.  Wound care sheet given.    It was a pleasure speaking to Nick Villalobos today.  Previous clinic notes and pertinent laboratory tests were reviewed prior to Nick Villalobos's visit.    Nature and genetics of benign skin lesions dicussed with patient.  Signs and Symptoms of skin cancer discussed with patient.  Patient encouraged to perform monthly skin exams.  UV precautions reviewed with patient.  Risks of non-melanoma skin cancer discussed with patient   Return to clinic 6 months

## 2022-01-04 NOTE — LETTER
2022         RE: Nick Villalobos  [address] [facility] [address]        Dear Colleague,    Thank you for referring your patient, Nick Villalobos, to the [facility]. Please see a copy of my visit note below.    Nick Villalobos is an extremely pleasant 66 year old year old male patient here today for spot on scalp and redness on nose.   .   Patient states this has been present for a while.  Patient reports the following symptoms:  Rough on scalp.  .  Patient reports the following previous treatments none.  These treatments did not work.  Patient reports the following modifying factors none.  Associated symptoms: none.  Patient has no other skin complaints today.  Remainder of the HPI, Meds, PMH, Allergies, FH, and SH was reviewed in chart.      Past Medical History:   Diagnosis Date     Basal cell carcinoma      Esophageal reflux      Family history of malignant neoplasm of prostate 5/10/2007    Father at 53       Past Surgical History:   Procedure Laterality Date     SURGICAL HISTORY OF -       tendon repair in right hand     ZZHC COLONOSCOPY THRU STOMA, DIAGNOSTIC  6/15/07    Hyperplastic Polyp.  Needs repeat in 5 years        Family History   Problem Relation Age of Onset     Prostate Cancer Father      Cancer - colorectal Father      C.A.D. Father          at 67, presumed MI     Hypertension Father      Cerebrovascular Disease Mother         at 64     Skin Cancer Paternal Grandmother        Social History     Socioeconomic History     Marital status:      Spouse name: Cheryl     Number of children: 3     Years of education: Not on file     Highest education level: Not on file   Occupational History     Not on file   Tobacco Use     Smoking status: Former Smoker     Years: 30.00     Quit date: 2001     Years since quittin.0     Smokeless tobacco: Never Used   Substance and Sexual Activity     Alcohol use: Yes     Comment: 2x per month      Drug use: No     Sexual activity: Yes     Partners: Female   Other Topics Concern      Service Not Asked     Blood Transfusions No     Caffeine Concern No     Occupational Exposure Not Asked     Hobby Hazards Not Asked     Sleep Concern No     Stress Concern Not Asked     Weight Concern Not Asked     Special Diet Not Asked     Back Care Not Asked     Exercise Yes     Comment: Runs 30-40 miles a month     Bike Helmet Not Asked     Seat Belt Yes     Self-Exams Not Asked     Parent/sibling w/ CABG, MI or angioplasty before 65F 55M? Yes   Social History Narrative    [employer]     Social Determinants of Health     Financial Resource Strain: Not on file   Food Insecurity: Not on file   Transportation Needs: Not on file   Physical Activity: Not on file   Stress: Not on file   Social Connections: Not on file   Intimate Partner Violence: Not on file   Housing Stability: Not on file       Outpatient Encounter Medications as of 1/4/2022   Medication Sig Dispense Refill     diazepam (VALIUM) 5 MG tablet Take 1 tablet (5 mg) by mouth daily as needed for anxiety (OR restless leg flare) 30 tablet 0     order for DME Night splint (Patient not taking: Reported on 5/11/2021) 1 Device 1     No facility-administered encounter medications on file as of 1/4/2022.             O:   NAD, WDWN, Alert & Oriented, Mood & Affect wnl, Vitals stable   Here today alone   /71 (BP Location: Left arm, Patient Position: Sitting, Cuff Size: Adult Large)   Pulse 52   SpO2 97%    General appearance normal   Vitals stable   Alert, oriented and in no acute distress     Scalp inflamed seborrheic keratosis   Telangiectasia on nose    Stuck on papules and brown macules on trunk and ext   Red papules on trunk  Flesh colored papules on trunk     The remainder of expanded problem focused exam was normal; the following areas were examined:  scalp/hair, conjunctiva/lids, face, neck, lips, chest, digits/nails, RUE, LUE.      Eyes:  Conjunctivae/lids:Normal     ENT: Lips, buccal mucosa, tongue: normal    MSK:Normal    Cardiovascular: peripheral edema none    Pulm: Breathing Normal    Lymph Nodes: No Head and Neck Lymphadenopathy     Neuro/Psych: Orientation:Alert and Orientedx3 ; Mood/Affect:normal       A/P:  1. Seborrheic keratosis, lentigo, angioma, dermal nevus  2. Hx of non-melanoma skin cancer  ipl discussed with patient   Schedule  3. Inflamed seborrheic keratosis scalp  LN2:  Treated with LN2 for 5s for 1-2 cycles. Warned risks of blistering, pain, pigment change, scarring, and incomplete resolution.  Advised patient to return if lesions do not completely resolve.  Wound care sheet given.    It was a pleasure speaking to Nick Villalobos today.  Previous clinic notes and pertinent laboratory tests were reviewed prior to Nick Villalobos's visit.    Nature and genetics of benign skin lesions dicussed with patient.  Signs and Symptoms of skin cancer discussed with patient.  Patient encouraged to perform monthly skin exams.  UV precautions reviewed with patient.  Risks of non-melanoma skin cancer discussed with patient   Return to clinic 6 months        Again, thank you for allowing me to participate in the care of your patient.        Sincerely,        Alcides Quiroz MD

## 2022-03-16 DIAGNOSIS — G25.81 RESTLESS LEGS SYNDROME: ICD-10-CM

## 2022-03-16 DIAGNOSIS — F41.0 ANXIETY ATTACK: ICD-10-CM

## 2022-03-16 DIAGNOSIS — Z79.899 CONTROLLED SUBSTANCE AGREEMENT SIGNED: ICD-10-CM

## 2022-03-16 NOTE — TELEPHONE ENCOUNTER
PT would like refill of diazepam before traveling on airplane. He is leaving on April 24th.   Has earliest appointment scheduled, however would like to discuss further need for the prescription beyond traveling. Will be available anytime for phone call, ok to leave a detailed message on voicemail.

## 2022-03-18 RX ORDER — DIAZEPAM 5 MG
5 TABLET ORAL DAILY PRN
Qty: 10 TABLET | Refills: 0 | Status: SHIPPED | OUTPATIENT
Start: 2022-03-18 | End: 2022-05-11

## 2022-03-18 NOTE — TELEPHONE ENCOUNTER
Routing refill request to provider for review/approval because:  Drug not on the G refill protocol      Disp Refills Start End ROMY   diazepam (VALIUM) 5 MG tablet 30 tablet 0 5/11/2021  No   Sig - Route: Take 1 tablet (5 mg) by mouth daily as needed for anxiety (OR restless leg flare) - Oral   Sent to pharmacy as: diazePAM 5 MG Oral Tablet (VALIUM)   Class: E-Prescribe   Order: [id number]   E-Prescribing Status: Receipt confirmed by pharmacy (5/11/2021 10:54 AM CDT)     [id number] [place] [id number] [facility] [address] [facility]     Last office visit 5/11/2021  Patient has future appointment  Appointments in Next Year    May 11, 2022  2:20 PM  (Arrive by 2:00 PM)  Provider Visit with Rose Webb PA-C  [facility] [place] ([facility] ) [phone]        Controlled substance agreement signed 5/11/21    Keely BLOOM RN   [facility] Triage

## 2022-03-18 NOTE — TELEPHONE ENCOUNTER
Patient inquiring about refill. See previous encounter.     Patient has appointment for 5/11/22 with pcp.     #[phone], Ok to leave detailed message.

## 2022-05-11 ENCOUNTER — OFFICE VISIT (OUTPATIENT)
Dept: FAMILY MEDICINE | Facility: CLINIC | Age: 67
End: 2022-05-11
Payer: COMMERCIAL

## 2022-05-11 VITALS
BODY MASS INDEX: 32.93 KG/M2 | SYSTOLIC BLOOD PRESSURE: 142 MMHG | WEIGHT: 230 LBS | RESPIRATION RATE: 16 BRPM | TEMPERATURE: 97.5 F | DIASTOLIC BLOOD PRESSURE: 80 MMHG | HEART RATE: 79 BPM | HEIGHT: 70 IN | OXYGEN SATURATION: 98 %

## 2022-05-11 DIAGNOSIS — N18.2 CKD (CHRONIC KIDNEY DISEASE) STAGE 2, GFR 60-89 ML/MIN: ICD-10-CM

## 2022-05-11 DIAGNOSIS — G47.20 DISRUPTED SLEEP-WAKE CYCLE: ICD-10-CM

## 2022-05-11 DIAGNOSIS — E78.1 HYPERTRIGLYCERIDEMIA: ICD-10-CM

## 2022-05-11 DIAGNOSIS — G25.81 RESTLESS LEGS SYNDROME (RLS): Primary | ICD-10-CM

## 2022-05-11 DIAGNOSIS — Z13.220 SCREENING FOR HYPERLIPIDEMIA: ICD-10-CM

## 2022-05-11 DIAGNOSIS — Z13.1 SCREENING FOR DIABETES MELLITUS: ICD-10-CM

## 2022-05-11 DIAGNOSIS — F41.0 ANXIETY ATTACK: ICD-10-CM

## 2022-05-11 DIAGNOSIS — R03.0 ELEVATED BP WITHOUT DIAGNOSIS OF HYPERTENSION: ICD-10-CM

## 2022-05-11 DIAGNOSIS — E34.9 HORMONE DISTURBANCE: ICD-10-CM

## 2022-05-11 DIAGNOSIS — Z13.0 SCREENING FOR DEFICIENCY ANEMIA: ICD-10-CM

## 2022-05-11 DIAGNOSIS — F51.8 DISRUPTED SLEEP-WAKE CYCLE: ICD-10-CM

## 2022-05-11 DIAGNOSIS — Z23 NEED FOR TDAP VACCINATION: ICD-10-CM

## 2022-05-11 DIAGNOSIS — Z79.899 CONTROLLED SUBSTANCE AGREEMENT SIGNED: ICD-10-CM

## 2022-05-11 DIAGNOSIS — Z12.5 SCREENING FOR PROSTATE CANCER: ICD-10-CM

## 2022-05-11 DIAGNOSIS — Z23 NEED FOR SHINGLES VACCINE: ICD-10-CM

## 2022-05-11 DIAGNOSIS — B07.9 VIRAL WARTS, UNSPECIFIED TYPE: ICD-10-CM

## 2022-05-11 DIAGNOSIS — Z12.11 SCREEN FOR COLON CANCER: ICD-10-CM

## 2022-05-11 DIAGNOSIS — Z13.29 SCREENING FOR THYROID DISORDER: ICD-10-CM

## 2022-05-11 LAB
ERYTHROCYTE [DISTWIDTH] IN BLOOD BY AUTOMATED COUNT: 12.6 % (ref 10–15)
HCT VFR BLD AUTO: 42.8 % (ref 40–53)
HGB BLD-MCNC: 14.6 G/DL (ref 13.3–17.7)
MCH RBC QN AUTO: 30.2 PG (ref 26.5–33)
MCHC RBC AUTO-ENTMCNC: 34.1 G/DL (ref 31.5–36.5)
MCV RBC AUTO: 89 FL (ref 78–100)
PLATELET # BLD AUTO: 238 10E3/UL (ref 150–450)
RBC # BLD AUTO: 4.83 10E6/UL (ref 4.4–5.9)
WBC # BLD AUTO: 5.7 10E3/UL (ref 4–11)

## 2022-05-11 PROCEDURE — G0103 PSA SCREENING: HCPCS | Performed by: PHYSICIAN ASSISTANT

## 2022-05-11 PROCEDURE — 36415 COLL VENOUS BLD VENIPUNCTURE: CPT | Performed by: PHYSICIAN ASSISTANT

## 2022-05-11 PROCEDURE — 80061 LIPID PANEL: CPT | Performed by: PHYSICIAN ASSISTANT

## 2022-05-11 PROCEDURE — 99214 OFFICE O/P EST MOD 30 MIN: CPT | Performed by: PHYSICIAN ASSISTANT

## 2022-05-11 PROCEDURE — 85027 COMPLETE CBC AUTOMATED: CPT | Performed by: PHYSICIAN ASSISTANT

## 2022-05-11 PROCEDURE — 80053 COMPREHEN METABOLIC PANEL: CPT | Performed by: PHYSICIAN ASSISTANT

## 2022-05-11 PROCEDURE — 84443 ASSAY THYROID STIM HORMONE: CPT | Performed by: PHYSICIAN ASSISTANT

## 2022-05-11 PROCEDURE — 82043 UR ALBUMIN QUANTITATIVE: CPT | Performed by: PHYSICIAN ASSISTANT

## 2022-05-11 RX ORDER — ZOSTER VACCINE RECOMBINANT, ADJUVANTED 50 MCG/0.5
1 KIT INTRAMUSCULAR ONCE
Qty: 0.5 ML | Refills: 1 | Status: SHIPPED | OUTPATIENT
Start: 2022-05-11 | End: 2022-05-11

## 2022-05-11 RX ORDER — DIAZEPAM 5 MG
5 TABLET ORAL DAILY PRN
Qty: 30 TABLET | Refills: 0 | Status: SHIPPED | OUTPATIENT
Start: 2022-05-11 | End: 2023-01-26

## 2022-05-11 ASSESSMENT — ANXIETY QUESTIONNAIRES
1. FEELING NERVOUS, ANXIOUS, OR ON EDGE: NOT AT ALL
2. NOT BEING ABLE TO STOP OR CONTROL WORRYING: NOT AT ALL
IF YOU CHECKED OFF ANY PROBLEMS ON THIS QUESTIONNAIRE, HOW DIFFICULT HAVE THESE PROBLEMS MADE IT FOR YOU TO DO YOUR WORK, TAKE CARE OF THINGS AT HOME, OR GET ALONG WITH OTHER PEOPLE: NOT DIFFICULT AT ALL
5. BEING SO RESTLESS THAT IT IS HARD TO SIT STILL: NOT AT ALL
6. BECOMING EASILY ANNOYED OR IRRITABLE: NOT AT ALL
3. WORRYING TOO MUCH ABOUT DIFFERENT THINGS: NOT AT ALL
GAD7 TOTAL SCORE: 0
7. FEELING AFRAID AS IF SOMETHING AWFUL MIGHT HAPPEN: NOT AT ALL

## 2022-05-11 ASSESSMENT — PATIENT HEALTH QUESTIONNAIRE - PHQ9
SUM OF ALL RESPONSES TO PHQ QUESTIONS 1-9: 1
5. POOR APPETITE OR OVEREATING: NOT AT ALL

## 2022-05-11 NOTE — LETTER
[facility] [place]  05/11/22  Patient: Nick Villalobos  YOB: 1955  Medical Record Number: [id number]                                                                                  Non-Opioid Controlled Substance Agreement    This is an agreement between you and your provider regarding safe and appropriate use of controlled substances prescribed by your care team. Controlled substances are?medicines that can cause physical and mental dependence (abuse).     There are strict laws about having and using these medicines. We here at [facility] are  committed to working with you in your efforts to get better. To support you in this work, we'll help you schedule regular office appointments for medicine refills. If we must cancel or change your appointment for any reason, we'll make sure you have enough medicine to last until your next appointment.     As a Provider, I will:     Listen carefully to your concerns while treating you with respect.     Recommend a treatment plan that I believe is in your best interest and may involve therapies other than medicine.      Talk with you often about the possible benefits and the risk of harm of any medicine that we prescribe for you.    Assess the safety of this medicine and check how well it works.      Provide a plan on how to taper (discontinue or go off) using this medicine if the decision is made to stop its use.      ::  As a Patient, I understand controlled substances:       Are prescribed by my care provider to help me function or work and manage my condition(s).?    Are strong medicines and can cause serious side effects.       Need to be taken exactly as prescribed.?Combining controlled substances with certain medicines or chemicals (such as illegal drugs, alcohol, sedatives, sleeping pills, and benzodiazepines) can be dangerous or even fatal.? If I stop taking my medicines suddenly, I may have severe withdrawal symptoms.      The risks, benefits, and side effects of these medicine(s) were explained to me. I agree that:    1. I will take part in other treatments as advised by my care team. This may be psychiatry or counseling, physical therapy, behavioral therapy, group treatment or a referral to specialist.    2. I will keep all my appointments and understand this is part of the monitoring of controlled substances.?My care team may require an office visit for EVERY controlled substance refill. If I miss appointments or don t follow instructions, my care team may stop my medicine    3. I will take my medicines as prescribed. I will not change the dose or schedule unless my care team tells me to. There will be no refills if I run out early.      4. I may be asked to come to the clinic and complete a urine drug test or complete a pill count. If I don t give a urine sample or participate in a pill count, the care team may stop my medicine.    5. I will only receive controlled substance prescriptions from this clinic. If I am treated by another provider, I will tell them that I am taking controlled substances and that I have a treatment agreement with this provider. I will inform my [facility] care team within one business day if I am given a prescription for any controlled substance by another healthcare provider. My [facility] care team can contact other providers and pharmacists about my use of any medicines.    6. It is up to me to make sure that I don't run out of my medicines on weekends or holidays.?If my care team is willing to refill my prescription without a visit, I must request refills only during office hours. Refills may take up to 3 business days to process. I will use one pharmacy to fill all my controlled substance prescriptions. I will notify the clinic about any changes to my insurance or medicine availability.    7. I am responsible for my prescriptions. If the medicine/prescription is lost, stolen or  destroyed, it will not be replaced.?I also agree not to share controlled substance medicines with anyone.     8. I am aware I should not use any illegal or recreational drugs. I agree not to drink alcohol unless my care team says I can.     9. If I enroll in the [place] Medical Cannabis program, I will tell my care team before my next refill.    10. I will tell my care team right away if I become pregnant, have a new medical problem treated outside of my regular clinic, or have a change in my medicines.     11. I understand that this medicine can affect my thinking, judgment and reaction time.? Alcohol and drugs affect the brain and body, which can affect the safety of my driving. Being under the influence of alcohol or drugs can affect my decision-making, behaviors, personal safety and the safety of others. Driving while impaired (DWI) can occur if a person is driving, operating or in physical control of a car, motorcycle, boat, snowmobile, ATV, motorbike, off-road vehicle or any other motor vehicle ([place] Statute 169A.20). I understand the risk if I choose to drive or operate any vehicle or machinery.    I understand that if I do not follow any of the conditions above, my prescriptions or treatment may be stopped or changed.   I agree that my provider, clinic care team and pharmacy may work with any city, state or federal law enforcement agency that investigates the misuse, sale or other diversion of my controlled medicine. I will allow my provider to discuss my care with, or share a copy of, this agreement with any other treating provider, pharmacy or emergency room where I receive care.     I have read this agreement and have asked questions about anything I did not understand.    ________________________________________________________  Patient Signature - Nick Villalobos     ___________________                   Date     ________________________________________________________  Provider Signature - Rose  Olinda Webb, PA-C       ___________________                   Date     ________________________________________________________  Witness Signature (required if provider not present while patient signing)          ___________________                   Date

## 2022-05-11 NOTE — PROGRESS NOTES
Assessment & Plan     Restless legs syndrome (RLS)  Anxiety attack  Controlled substance agreement signed - Diazepam 5 mg #30 every 3 months for severe RLS flares and anxiety attacks  Disrupted sleep-wake cycle  Controlled substance agreement renewed today.  Recommend sleep evaluation for disrupted sleep to see if RLS is contributing or if other etiology is present.  No concern for misuse of diazepam as he is using sparingly.  Refilled today.  - Adult Sleep Eval & Management  Referral  - diazepam (VALIUM) 5 MG tablet  Dispense: 30 tablet; Refill: 0    Elevated BP without diagnosis of hypertension  CKD (chronic kidney disease) stage 2, GFR 60-89 ml/min  Blood pressure elevation despite multiple rechecks in the office today.  Patient has had some weight gain.  Historically has had CKD 2 on labs in 2018.  Labs for surveillance including microalbumin.  We will do a nurse only blood pressure check again in 1 to 2 weeks.  If evidence of kidney disease and/or persistent elevation of blood pressure may need to initiate blood pressure medication.  Lifestyle efforts encouraged for the patient.  - Albumin Random Urine Quantitative with Creat Ratio  - Comprehensive metabolic panel (BMP + Alb, Alk Phos, ALT, AST, Total. Bili, TP)    Viral warts, unspecified type  Patient has been seeing Dr. Quiroz but he is not geographically convenient for the patient.  New referral placed for the office closer to his home.  - Adult Dermatology Referral    Screening for prostate cancer  Routine screening  - PROSTATE SPEC ANTIGEN SCREEN    Screen for colon cancer  Routine screening  - Adult Gastro Ref - Procedure Only    Screening for hyperlipidemia  Routine screening  - Lipid panel reflex to direct LDL Non-fasting    Screening for thyroid disorder  Routine screening  - TSH with free T4 reflex    Need for shingles vaccine  Due for vaccine.  Sent to pharmacy  - zoster vaccine recombinant adjuvanted (SHINGRIX) injection  Dispense: 0.5  "mL; Refill: 1    Need for Tdap vaccination  Grandchild should be born in a few months.  Encouraged Tdap booster.  Will obtain at pharmacy.  - Tdap, tetanus-diphtheria-acell pertussis, (ADACEL) 5-2-15.5 LF-MCG/0.5 injection  Dispense: 0.5 mL; Refill: 0    Screening for deficiency anemia  Routine screening  - CBC with platelets     BMI:   Estimated body mass index is 33.48 kg/m  as calculated from the following:    Height as of this encounter: 1.765 m (5' 9.5\").    Weight as of this encounter: 104.3 kg (230 lb).   Weight management plan: Discussed healthy diet and exercise guidelines    Return in about 2 weeks (around 5/25/2022) for BP Recheck.    Rose Webb PA-C  [facility] PRIOR OFELIA Prieto is a 66 year old who presents for the following health issues     History of Present Illness       Reason for visit:  Check up    He eats 0-1 servings of fruits and vegetables daily.He consumes 0 sweetened beverage(s) daily.He exercises with enough effort to increase his heart rate 9 or less minutes per day.  He exercises with enough effort to increase his heart rate 3 or less days per week.   He is taking medications regularly.       Anxiety & RLS  Rare anxiety attacks.  When he has them they are quite severe.  Historically has used valium very sparingly. Additionally has RLS that is intermittent but very bothersome -especially when he tries to get on an airplane.  Valium has worked well for this.  Does not have on a daily basis.  Has a new leg massaging machine that has helped his symptoms significantly.   Ferritin level normal 2018.  Last RX for Valium was in 3/18/22 for #10.  30 tablets historically has lasted 3-4 months.      Warts  The patient has been getting some laser procedures done for vascularities with Dr. Quiroz.  This is very inconvenient up in [place], [place].  He is wondering about a new referral for a few skin conditions somewhere closer to his home.  He plans to finish " "up the vascularity treatments with Dr. Quiroz.    Sleep disruption  Patient reports that he is waking in the night more often.  He states that he does not snore.  He is wondering if his restless leg is contributing.  He has never had a sleep study.    Elevated BP  Is not monitoring regularly.  Has gained weight over the last year and with the pandemic.  Feels that when he exercises regularly this is quite helpful.  Is starting to return to weightlifting and running which typically make him feel quite well.        Review of Systems   Constitutional, HEENT, cardiovascular, pulmonary, GI, , musculoskeletal, neuro, skin, endocrine and psych systems are negative, except as otherwise noted.      Objective    BP (!) 142/80   Pulse 79   Temp 97.5  F (36.4  C) (Tympanic)   Resp 16   Ht 1.765 m (5' 9.5\")   Wt 104.3 kg (230 lb)   SpO2 98%   BMI 33.48 kg/m    Body mass index is 33.48 kg/m .  Physical Exam   GENERAL: healthy, alert and no distress  EYES: Eyes grossly normal to inspection,  HENT: ear canals and TM's normal, nose and mouth without ulcers or lesions  RESP: lungs clear to auscultation - no rales, rhonchi or wheezes  CV: regular rate and rhythm, normal S1 S2, no S3 or S4, no murmur, click or rub, no peripheral edema and peripheral pulses strong  ABDOMEN: soft, nontender, no hepatosplenomegaly, no masses and bowel sounds normal  MS: no gross musculoskeletal defects noted, no edema  SKIN: no suspicious lesions or rashes  NEURO: Normal strength and tone, mentation intact and speech normal  PSYCH: mentation appears normal, affect normal/bright    Results for orders placed or performed in visit on 05/11/22 (from the past 24 hour(s))   CBC with platelets   Result Value Ref Range    WBC Count 5.7 4.0 - 11.0 10e3/uL    RBC Count 4.83 4.40 - 5.90 10e6/uL    Hemoglobin 14.6 13.3 - 17.7 g/dL    Hematocrit 42.8 40.0 - 53.0 %    MCV 89 78 - 100 fL    MCH 30.2 26.5 - 33.0 pg    MCHC 34.1 31.5 - 36.5 g/dL    RDW 12.6 10.0 " - 15.0 %    Platelet Count 238 150 - 450 10e3/uL

## 2022-05-11 NOTE — LETTER
May 17, 2022      Conner Villalobos  [address] [address]        Dear ,    We are writing to inform you of your test results.    {results letter list:[id number]}    Resulted Orders   Lipid panel reflex to direct LDL Non-fasting   Result Value Ref Range    Cholesterol 211 (H) <200 mg/dL    Triglycerides 389 (H) <150 mg/dL    Direct Measure HDL 47 >=40 mg/dL    LDL Cholesterol Calculated 86 <=100 mg/dL    Non HDL Cholesterol 164 (H) <130 mg/dL    Patient Fasting > 8hrs? No     Narrative    Cholesterol  Desirable:  <200 mg/dL    Triglycerides  Normal:  Less than 150 mg/dL  Borderline High:  150-199 mg/dL  High:  200-499 mg/dL  Very High:  Greater than or equal to 500 mg/dL    Direct Measure HDL  Female:  Greater than or equal to 50 mg/dL   Male:  Greater than or equal to 40 mg/dL    LDL Cholesterol  Desirable:  <100mg/dL  Above Desirable:  100-129 mg/dL   Borderline High:  130-159 mg/dL   High:  160-189 mg/dL   Very High:  >= 190 mg/dL    Non HDL Cholesterol  Desirable:  130 mg/dL  Above Desirable:  130-159 mg/dL  Borderline High:  160-189 mg/dL  High:  190-219 mg/dL  Very High:  Greater than or equal to 220 mg/dL   PROSTATE SPEC ANTIGEN SCREEN   Result Value Ref Range    Prostate Specific Antigen Screen 1.93 0.00 - 4.00 ug/L   Albumin Random Urine Quantitative with Creat Ratio   Result Value Ref Range    Creatinine Urine mg/dL 131 mg/dL    Albumin Urine mg/L 12 mg/L    Albumin Urine mg/g Cr 9.16 0.00 - 17.00 mg/g Cr   Comprehensive metabolic panel (BMP + Alb, Alk Phos, ALT, AST, Total. Bili, TP)   Result Value Ref Range    Sodium 139 133 - 144 mmol/L    Potassium 4.5 3.4 - 5.3 mmol/L    Chloride 106 94 - 109 mmol/L    Carbon Dioxide (CO2) 27 20 - 32 mmol/L    Anion Gap 6 3 - 14 mmol/L    Urea Nitrogen 18 7 - 30 mg/dL    Creatinine 0.83 0.66 - 1.25 mg/dL    Calcium 9.0 8.5 - 10.1 mg/dL    Glucose 109 (H) 70 - 99 mg/dL    Alkaline Phosphatase 54 40 - 150 U/L    AST 28 0 - 45 U/L    ALT  51 0 - 70 U/L    Protein Total 7.2 6.8 - 8.8 g/dL    Albumin 3.8 3.4 - 5.0 g/dL    Bilirubin Total 0.4 0.2 - 1.3 mg/dL    GFR Estimate >90 >60 mL/min/1.73m2      Comment:      Effective December 21, 2021 eGFRcr in adults is calculated using the 2021 CKD-EPI creatinine equation which includes age and gender (Kelsy et al., NE, DOI: 10.1056/QXNPpq8493247)   TSH with free T4 reflex   Result Value Ref Range    TSH 0.70 0.40 - 4.00 mU/L   CBC with platelets   Result Value Ref Range    WBC Count 5.7 4.0 - 11.0 10e3/uL    RBC Count 4.83 4.40 - 5.90 10e6/uL    Hemoglobin 14.6 13.3 - 17.7 g/dL    Hematocrit 42.8 40.0 - 53.0 %    MCV 89 78 - 100 fL    MCH 30.2 26.5 - 33.0 pg    MCHC 34.1 31.5 - 36.5 g/dL    RDW 12.6 10.0 - 15.0 %    Platelet Count 238 150 - 450 10e3/uL       If you have any questions or concerns, please call the clinic at the number listed above.       Sincerely,      Rose Webb PA-C

## 2022-05-11 NOTE — LETTER
[facility]  [address], [place] [address] [phone]                    May 17, 2022    Nick Villalobos  [address] [address]      Dear Nick,    Here is a summary of your recent test results:    -Normal red blood cell (hgb) levels, normal white blood cell count and normal platelet levels.   -PSA (prostate specific antigen) test is normal.  This indicates a low likelihood of prostate cancer.  ADVISE: rechecking this in 1 year.   -Triglycerides are quite elevated which can increase your heart disease risk.  However, this was NOT a fasting specimen.  I'd like to have you repeat this test with a fasting lab only visit to determine if your triglycerides remain elevated and if they necessitate treatment.  I will ask my staff to reach out to you to schedule.   -Liver and gallbladder tests (ALT,AST, Alk phos,bilirubin) are normal.   -Kidney function (GFR) is normal.   -Sodium is normal.   -Potassium is normal.   -Calcium is normal.   -Glucose is mildly elevated but you were nonfasting and this is okay..   -TSH (thyroid stimulating hormone) level is normal which indicates normal thyroid function.   -Microalbumin (urine protein) test is normal.  ADVISE: rechecking this annually.     For additional lab test information, [email or web address] is an excellent reference.     Your test results are enclosed.      Please contact me if you have any questions.    In addition, here is a list of due or overdue Health Maintenance reminders.    Health Maintenance Due   Topic Date Due     ANNUAL REVIEW OF HM ORDERS  Never done     COVID-19 Vaccine (1) Never done     Zoster (Shingles) Vaccine (1 of 2) Never done     Discuss Advance Care Planning  08/25/2016     Annual Wellness Visit  12/28/2021     AORTIC ANEURYSM SCREENING (SYSTEM ASSIGNED)  Never done     Pneumococcal Vaccine (1 - PCV) 12/28/2021       Please call us at [phone] (or use [employer]) to address the  above recommendations.            Thank you very much for trusting [facility].     Healthy regards,      Rose Webb PA-C         Results for orders placed or performed in visit on 05/11/22   Lipid panel reflex to direct LDL Non-fasting     Status: Abnormal   Result Value Ref Range    Cholesterol 211 (H) <200 mg/dL    Triglycerides 389 (H) <150 mg/dL    Direct Measure HDL 47 >=40 mg/dL    LDL Cholesterol Calculated 86 <=100 mg/dL    Non HDL Cholesterol 164 (H) <130 mg/dL    Patient Fasting > 8hrs? No     Narrative    Cholesterol  Desirable:  <200 mg/dL    Triglycerides  Normal:  Less than 150 mg/dL  Borderline High:  150-199 mg/dL  High:  200-499 mg/dL  Very High:  Greater than or equal to 500 mg/dL    Direct Measure HDL  Female:  Greater than or equal to 50 mg/dL   Male:  Greater than or equal to 40 mg/dL    LDL Cholesterol  Desirable:  <100mg/dL  Above Desirable:  100-129 mg/dL   Borderline High:  130-159 mg/dL   High:  160-189 mg/dL   Very High:  >= 190 mg/dL    Non HDL Cholesterol  Desirable:  130 mg/dL  Above Desirable:  130-159 mg/dL  Borderline High:  160-189 mg/dL  High:  190-219 mg/dL  Very High:  Greater than or equal to 220 mg/dL   PROSTATE SPEC ANTIGEN SCREEN     Status: Normal   Result Value Ref Range    Prostate Specific Antigen Screen 1.93 0.00 - 4.00 ug/L   Albumin Random Urine Quantitative with Creat Ratio     Status: None   Result Value Ref Range    Creatinine Urine mg/dL 131 mg/dL    Albumin Urine mg/L 12 mg/L    Albumin Urine mg/g Cr 9.16 0.00 - 17.00 mg/g Cr   Comprehensive metabolic panel (BMP + Alb, Alk Phos, ALT, AST, Total. Bili, TP)     Status: Abnormal   Result Value Ref Range    Sodium 139 133 - 144 mmol/L    Potassium 4.5 3.4 - 5.3 mmol/L    Chloride 106 94 - 109 mmol/L    Carbon Dioxide (CO2) 27 20 - 32 mmol/L    Anion Gap 6 3 - 14 mmol/L    Urea Nitrogen 18 7 - 30 mg/dL    Creatinine 0.83 0.66 - 1.25 mg/dL    Calcium 9.0 8.5 - 10.1 mg/dL    Glucose 109 (H)  70 - 99 mg/dL    Alkaline Phosphatase 54 40 - 150 U/L    AST 28 0 - 45 U/L    ALT 51 0 - 70 U/L    Protein Total 7.2 6.8 - 8.8 g/dL    Albumin 3.8 3.4 - 5.0 g/dL    Bilirubin Total 0.4 0.2 - 1.3 mg/dL    GFR Estimate >90 >60 mL/min/1.73m2   TSH with free T4 reflex     Status: Normal   Result Value Ref Range    TSH 0.70 0.40 - 4.00 mU/L   CBC with platelets     Status: Normal   Result Value Ref Range    WBC Count 5.7 4.0 - 11.0 10e3/uL    RBC Count 4.83 4.40 - 5.90 10e6/uL    Hemoglobin 14.6 13.3 - 17.7 g/dL    Hematocrit 42.8 40.0 - 53.0 %    MCV 89 78 - 100 fL    MCH 30.2 26.5 - 33.0 pg    MCHC 34.1 31.5 - 36.5 g/dL    RDW 12.6 10.0 - 15.0 %    Platelet Count 238 150 - 450 10e3/uL

## 2022-05-12 LAB
ALBUMIN SERPL-MCNC: 3.8 G/DL (ref 3.4–5)
ALP SERPL-CCNC: 54 U/L (ref 40–150)
ALT SERPL W P-5'-P-CCNC: 51 U/L (ref 0–70)
ANION GAP SERPL CALCULATED.3IONS-SCNC: 6 MMOL/L (ref 3–14)
AST SERPL W P-5'-P-CCNC: 28 U/L (ref 0–45)
BILIRUB SERPL-MCNC: 0.4 MG/DL (ref 0.2–1.3)
BUN SERPL-MCNC: 18 MG/DL (ref 7–30)
CALCIUM SERPL-MCNC: 9 MG/DL (ref 8.5–10.1)
CHLORIDE BLD-SCNC: 106 MMOL/L (ref 94–109)
CHOLEST SERPL-MCNC: 211 MG/DL
CO2 SERPL-SCNC: 27 MMOL/L (ref 20–32)
CREAT SERPL-MCNC: 0.83 MG/DL (ref 0.66–1.25)
CREAT UR-MCNC: 131 MG/DL
FASTING STATUS PATIENT QL REPORTED: NO
GFR SERPL CREATININE-BSD FRML MDRD: >90 ML/MIN/1.73M2
GLUCOSE BLD-MCNC: 109 MG/DL (ref 70–99)
HDLC SERPL-MCNC: 47 MG/DL
LDLC SERPL CALC-MCNC: 86 MG/DL
MICROALBUMIN UR-MCNC: 12 MG/L
MICROALBUMIN/CREAT UR: 9.16 MG/G CR (ref 0–17)
NONHDLC SERPL-MCNC: 164 MG/DL
POTASSIUM BLD-SCNC: 4.5 MMOL/L (ref 3.4–5.3)
PROT SERPL-MCNC: 7.2 G/DL (ref 6.8–8.8)
PSA SERPL-MCNC: 1.93 UG/L (ref 0–4)
SODIUM SERPL-SCNC: 139 MMOL/L (ref 133–144)
TRIGL SERPL-MCNC: 389 MG/DL
TSH SERPL DL<=0.005 MIU/L-ACNC: 0.7 MU/L (ref 0.4–4)

## 2022-05-12 ASSESSMENT — ANXIETY QUESTIONNAIRES: GAD7 TOTAL SCORE: 0

## 2022-05-16 ENCOUNTER — OFFICE VISIT (OUTPATIENT)
Dept: DERMATOLOGY | Facility: CLINIC | Age: 67
End: 2022-05-16
Payer: COMMERCIAL

## 2022-05-16 DIAGNOSIS — I78.1 TELANGIECTASIA: Primary | ICD-10-CM

## 2022-05-16 DIAGNOSIS — Z85.828 HISTORY OF SKIN CANCER: ICD-10-CM

## 2022-05-16 DIAGNOSIS — L82.1 SEBORRHEIC KERATOSIS: ICD-10-CM

## 2022-05-16 DIAGNOSIS — L81.4 LENTIGO: ICD-10-CM

## 2022-05-16 DIAGNOSIS — D23.9 DERMATOFIBROMA: ICD-10-CM

## 2022-05-16 PROBLEM — E78.1 HYPERTRIGLYCERIDEMIA: Status: ACTIVE | Noted: 2022-05-16

## 2022-05-16 PROCEDURE — 96999 UNLISTED SPEC DERM SVC/PX: CPT | Performed by: DERMATOLOGY

## 2022-05-16 PROCEDURE — 99213 OFFICE O/P EST LOW 20 MIN: CPT | Performed by: DERMATOLOGY

## 2022-05-16 NOTE — RESULT ENCOUNTER NOTE
Team: please mail result letter    Triage: please call pt and advise of elevated triglycerides (quite high - at risk for pancreatitis) and need to repeat with fasating specimen to determine how to move forward.  Please assist with scheduling fasting lab only janessa Prieto  I have reviewed your recent labs. Here are the results:    -Normal red blood cell (hgb) levels, normal white blood cell count and normal platelet levels.  -PSA (prostate specific antigen) test is normal.  This indicates a low likelihood of prostate cancer.  ADVISE: rechecking this in 1 year.  -Triglycerides are quite elevated which can increase your heart disease risk.  However, this was NOT a fasting specimen.  I'd like to have you repeat this test with a fasting lab only visit to determine if your triglycerides remain elevated and if they necessitate treatment.  I will ask my staff to reach out to you to schedule.  -Liver and gallbladder tests (ALT,AST, Alk phos,bilirubin) are normal.  -Kidney function (GFR) is normal.  -Sodium is normal.  -Potassium is normal.  -Calcium is normal.  -Glucose is mildly elevated but you were nonfasting and this is okay..  -TSH (thyroid stimulating hormone) level is normal which indicates normal thyroid function.  -Microalbumin (urine protein) test is normal.  ADVISE: rechecking this annually.    For additional lab test information, [email or web address] is an excellent reference.      If you have any questions please do not hesitate to contact our office via phone ([phone]) or MyChart.    Rose Webb MBA, MS, PA-C  [facility]

## 2022-05-16 NOTE — PATIENT INSTRUCTIONS
You have been treated with IPL. The treated area is very delicate and should be treated gently. Many patients develop immediate redness and swelling of the treated area which feels like a sunburn. Occasionally, blisters and crusts may form. Please read and follow these instructions.     Quick warm showers are recommended. If areas are treated other than the facial area, hot baths are not advised for 24 hours.    Cold compresses (ice packs) should be applied immediately after treatment and may reduce postoperative pain and minimize swelling.    Wound care: wash the treated area with a mild soap twice daily. Emollients such as aquaphor, Aloe Vera gel or petrolatum ointments can be soothing when applied 2-3 times daily until healing is complete. If blistering occurs, a topical antibiotic ointment applied twice daily is helpful. A bandage is not required, however, applying a non-stick bandage or band-aid may help protect a particular area from being irritated by clothing or jewelry.    If crusts or scabs develop, be careful not to scratch or pick at the treated area. Allow them to fall off on their own.    Analgesics such as acetaminophen or ibuprofen may be taken if necessary to reduce discomfort. Additional application of ice or cool compresses in the first 24-36 hours may also be helpful.    Make-up may be applied on the next day unless blistering or crusts developed. Crusts usually disappear within 1 week. A good mineral, non-comedogenic makeup such as Danyell Iredale is recommended.    Any degree of suntan will make the laser treatment less effective and increase your chance of having adverse effects such as blisters and scarring. It is absolutely necessary that you protect the area with a sunscreen that blocks UVA and UVB rays (SPF 15-30) when going outdoors (especially if you require future treatments). This should be done 4 weeks before and after treatment.

## 2022-05-16 NOTE — LETTER
2022         RE: Nick Villalobos  [address] [facility] [id number]        Dear Colleague,    Thank you for referring your patient, Nick Villalobos, to the [facility]. Please see a copy of my visit note below.    Nick Villalobos is an extremely pleasant 66 year old year old male patient here today for ipl of vessels on face.  Today he notes spot on right shoulder.   .   Patient states this has been present for a whgile.  Patient reports the following symptoms:  firm.  Patient reports the following previous treatments none.  These treatments did not work.  Patient reports the following modifying factors none.  Associated symptoms: none.  Patient has no other skin complaints today.  Remainder of the HPI, Meds, PMH, Allergies, FH, and SH was reviewed in chart.      Past Medical History:   Diagnosis Date     Basal cell carcinoma      Esophageal reflux      Family history of malignant neoplasm of prostate 5/10/2007    Father at 53       Past Surgical History:   Procedure Laterality Date     COLONOSCOPY  2018    5 year follow up recommended     SURGICAL HISTORY OF -       tendon repair in right hand     ZZHC COLONOSCOPY THRU STOMA, DIAGNOSTIC  06/15/2007    Hyperplastic Polyp.  Needs repeat in 5 years        Family History   Problem Relation Age of Onset     Cerebrovascular Disease Mother         at 64     Prostate Cancer Father      Cancer - colorectal Father      C.A.D. 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   Tobacco Use     Smoking status: Former  Smoker     Years: 30.00     Quit date: 2001     Years since quittin.4     Smokeless tobacco: Never Used   Vaping Use     Vaping Use: Never used   Substance and Sexual Activity     Alcohol use: Yes     Comment: 2x per month     Drug use: No     Sexual activity: Yes     Partners: Female   Other Topics Concern      Service Not Asked     Blood Transfusions No     Caffeine Concern No     Occupational Exposure Not Asked     Hobby Hazards Not Asked     Sleep Concern No     Stress Concern Not Asked     Weight Concern Not Asked     Special Diet Not Asked     Back Care Not Asked     Exercise Yes     Comment: Runs 30-40 miles a month     Bike Helmet Not Asked     Seat Belt Yes     Self-Exams Not Asked     Parent/sibling w/ CABG, MI or angioplasty before 65F 55M? Yes   Social History Narrative    [place] [employer]     Social Determinants of Health     Financial Resource Strain: Not on file   Food Insecurity: Not on file   Transportation Needs: Not on file   Physical Activity: Not on file   Stress: Not on file   Social Connections: Not on file   Intimate Partner Violence: Not on file   Housing Stability: Not on file       Outpatient Encounter Medications as of 2022   Medication Sig Dispense Refill     diazepam (VALIUM) 5 MG tablet Take 1 tablet (5 mg) by mouth daily as needed for anxiety (OR restless leg flare) 30 tablet 0     No facility-administered encounter medications on file as of 2022.             O:   NAD, WDWN, Alert & Oriented, Mood & Affect wnl, Vitals stable   Here today alone    General appearance normal   Vitals stable   Alert, oriented and in no acute distress     Telangiectasia on face   R shoulder firm papule   Stuck on papules and brown macules on trunk and ext       Eyes: Conjunctivae/lids:Normal     ENT: Lips, buccal mucosa, tongue: normal    MSK:Normal    Cardiovascular: peripheral edema none    Pulm: Breathing Normal    Neuro/Psych: Orientation:Alert and Orientedx3 ;  Mood/Affect:normal       A/P:  1. Seborrheic keratosis, lentigo, dermatofibroma  2. Telangiectasia   ipl discussed with patient   It was a pleasure speaking to Nick Villalobos today.  Previous clinic notes and pertinent laboratory tests were reviewed prior to Nick Villalobos's visit.  Nature and genetics of benign skin lesions dicussed with patient.  Signs and Symptoms of skin cancer discussed with patient.  Patient encouraged to perform monthly skin exams.  UV precautions reviewed with patient.  Return to clinic 6 months    PROCEDURE NOTE  CONSENT FORM for PHOTOFACIAL/SKIN REJUVENATION   I consent to and authorize to perform treatments on me. Light can be used effectively to destroy targets located in the skin with minimum damage to the surrounding tissues. Light is used to lighten, fade or remove photo-damaged skin in a nonablative manner, a procedure known as photo rejuvenation. Visible sings of photo damage include wrinkling, enlarged pores, course skin texture, and pigment alterations. Photo-therapy, despite its high levels of efficacy and safety, is not free of side effects. Erythema (redness) and edema (swelling) of the treated area can occur but usually subsides within a few hours but can last up to seven days or longer. Irritation, itching, and/or a mild burning sensation or pain similar to sunburn may occur within 48 hours of treatment.   Pigmentary changes such as hyper pigmentation and hypo pigmentation of the skin in the treated areas can occasionally occur. Mostly it is transient, lasting up to six months, but in rare cases it can be permanent. Most cases of hypo- or hyper-pigmentation occur in people with darker skin or when the treated area has been exposed to sunlight before or after treatment. Occasionally these pigmentary changes occur despite appropriate protection from the sun.    Scarring, which can be hypertrophic or even keloid, can occur. Other known complications of this procedure  include blisters, reddening, pinpoint pitted scars, bruising, superficial crusting, burns, pain, and infections. These side effects are usually temporary, lasting from five to ten days but can be permanent as well. The skin at or near the treatment site may become fragile. If this happens, makeup should be avoided and the area should not be rubbed, as this might tear the skin. A blue-purple bruise may appear on the treated area, which might last from five to fifteen days. As the bruise fades, there may be rust-brown discoloration of this skin, which fades in one to three months or longer.    Additionally, there is a known and expected loss of hair in the treated areas. In a very small percent of people there is new hair growth in the surrounding areas being treated. Even though appropriate measures are taken to reduce side effects, they cannot be completely eliminated in every case. I understand that the treatment may involve risks of complication or injury from both known and unknown causes, and I freely assume these risks. There may be other treatment options, such as injections, other types of lasers/light sources or peels. With this in mind, I am choosing this non-invasive treatment for vascular and/or pigment lesions and other indicated skin conditions.   Eye damage can occur from the light and therefore protective eyewear must be worn during all phototherapy sessions.  I have read and understand the Pre and Post-Treatment Instructions. I agree to follow these instructions carefully. I understand that compliance with recommended pre and post procedure guidelines are crucial for healing, prevention of scarring, and other side effects and complications such as hyper pigmentation, hypo pigmentation, and other skin textural changes.    After eye protection had been placed, analgesia obtained with cooling, ipl laser was used at a fluence of 18 J 560 nm 3.5/20 to treat nose and cheeks.  An excellent clinical response  was obtained and the patient confirmed that all sites had been treated.  The patient was given wound care instructions and will return in 1 month.        Again, thank you for allowing me to participate in the care of your patient.        Sincerely,        Alcides Quiroz MD

## 2022-05-16 NOTE — PROGRESS NOTES
Nick Villalobos is an extremely pleasant 66 year old year old male patient here today for ipl of vessels on face.  Today he notes spot on right shoulder.   .   Patient states this has been present for a whgile.  Patient reports the following symptoms:  firm.  Patient reports the following previous treatments none.  These treatments did not work.  Patient reports the following modifying factors none.  Associated symptoms: none.  Patient has no other skin complaints today.  Remainder of the HPI, Meds, PMH, Allergies, FH, and SH was reviewed in chart.      Past Medical History:   Diagnosis Date     Basal cell carcinoma      Esophageal reflux      Family history of malignant neoplasm of prostate 5/10/2007    Father at 53       Past Surgical History:   Procedure Laterality Date     COLONOSCOPY  2018    5 year follow up recommended     SURGICAL HISTORY OF -       tendon repair in right hand     ZZHC COLONOSCOPY THRU STOMA, DIAGNOSTIC  06/15/2007    Hyperplastic Polyp.  Needs repeat in 5 years        Family History   Problem Relation Age of Onset     Cerebrovascular Disease Mother         at 64     Prostate Cancer Father      Cancer - colorectal Father      C.A.D. 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   Tobacco Use     Smoking status: Former Smoker     Years: 30.00     Quit date: 2001     Years since quittin.4     Smokeless tobacco: Never Used   Vaping Use     Vaping Use: Never used   Substance and Sexual Activity     Alcohol use: Yes     Comment: 2x per month     Drug use: No     Sexual  activity: Yes     Partners: Female   Other Topics Concern      Service Not Asked     Blood Transfusions No     Caffeine Concern No     Occupational Exposure Not Asked     Hobby Hazards Not Asked     Sleep Concern No     Stress Concern Not Asked     Weight Concern Not Asked     Special Diet Not Asked     Back Care Not Asked     Exercise Yes     Comment: Runs 30-40 miles a month     Bike Helmet Not Asked     Seat Belt Yes     Self-Exams Not Asked     Parent/sibling w/ CABG, MI or angioplasty before 65F 55M? Yes   Social History Narrative    [place] [employer]     Social Determinants of Health     Financial Resource Strain: Not on file   Food Insecurity: Not on file   Transportation Needs: Not on file   Physical Activity: Not on file   Stress: Not on file   Social Connections: Not on file   Intimate Partner Violence: Not on file   Housing Stability: Not on file       Outpatient Encounter Medications as of 5/16/2022   Medication Sig Dispense Refill     diazepam (VALIUM) 5 MG tablet Take 1 tablet (5 mg) by mouth daily as needed for anxiety (OR restless leg flare) 30 tablet 0     No facility-administered encounter medications on file as of 5/16/2022.             O:   NAD, WDWN, Alert & Oriented, Mood & Affect wnl, Vitals stable   Here today alone    General appearance normal   Vitals stable   Alert, oriented and in no acute distress     Telangiectasia on face   R shoulder firm papule   Stuck on papules and brown macules on trunk and ext       Eyes: Conjunctivae/lids:Normal     ENT: Lips, buccal mucosa, tongue: normal    MSK:Normal    Cardiovascular: peripheral edema none    Pulm: Breathing Normal    Neuro/Psych: Orientation:Alert and Orientedx3 ; Mood/Affect:normal       A/P:  1. Seborrheic keratosis, lentigo, dermatofibroma  2. Telangiectasia   ipl discussed with patient   It was a pleasure speaking to Nick Villalobos today.  Previous clinic notes and pertinent laboratory tests were reviewed prior to Nick PARKER  Wilfredo's visit.  Nature and genetics of benign skin lesions dicussed with patient.  Signs and Symptoms of skin cancer discussed with patient.  Patient encouraged to perform monthly skin exams.  UV precautions reviewed with patient.  Return to clinic 6 months    PROCEDURE NOTE  CONSENT FORM for PHOTOFACIAL/SKIN REJUVENATION   I consent to and authorize to perform treatments on me. Light can be used effectively to destroy targets located in the skin with minimum damage to the surrounding tissues. Light is used to lighten, fade or remove photo-damaged skin in a nonablative manner, a procedure known as photo rejuvenation. Visible sings of photo damage include wrinkling, enlarged pores, course skin texture, and pigment alterations. Photo-therapy, despite its high levels of efficacy and safety, is not free of side effects. Erythema (redness) and edema (swelling) of the treated area can occur but usually subsides within a few hours but can last up to seven days or longer. Irritation, itching, and/or a mild burning sensation or pain similar to sunburn may occur within 48 hours of treatment.   Pigmentary changes such as hyper pigmentation and hypo pigmentation of the skin in the treated areas can occasionally occur. Mostly it is transient, lasting up to six months, but in rare cases it can be permanent. Most cases of hypo- or hyper-pigmentation occur in people with darker skin or when the treated area has been exposed to sunlight before or after treatment. Occasionally these pigmentary changes occur despite appropriate protection from the sun.    Scarring, which can be hypertrophic or even keloid, can occur. Other known complications of this procedure include blisters, reddening, pinpoint pitted scars, bruising, superficial crusting, burns, pain, and infections. These side effects are usually temporary, lasting from five to ten days but can be permanent as well. The skin at or near the treatment site may become fragile.  If this happens, makeup should be avoided and the area should not be rubbed, as this might tear the skin. A blue-purple bruise may appear on the treated area, which might last from five to fifteen days. As the bruise fades, there may be rust-brown discoloration of this skin, which fades in one to three months or longer.    Additionally, there is a known and expected loss of hair in the treated areas. In a very small percent of people there is new hair growth in the surrounding areas being treated. Even though appropriate measures are taken to reduce side effects, they cannot be completely eliminated in every case. I understand that the treatment may involve risks of complication or injury from both known and unknown causes, and I freely assume these risks. There may be other treatment options, such as injections, other types of lasers/light sources or peels. With this in mind, I am choosing this non-invasive treatment for vascular and/or pigment lesions and other indicated skin conditions.   Eye damage can occur from the light and therefore protective eyewear must be worn during all phototherapy sessions.  I have read and understand the Pre and Post-Treatment Instructions. I agree to follow these instructions carefully. I understand that compliance with recommended pre and post procedure guidelines are crucial for healing, prevention of scarring, and other side effects and complications such as hyper pigmentation, hypo pigmentation, and other skin textural changes.    After eye protection had been placed, analgesia obtained with cooling, ipl laser was used at a fluence of 18 J 560 nm 3.5/20 to treat nose and cheeks.  An excellent clinical response was obtained and the patient confirmed that all sites had been treated.  The patient was given wound care instructions and will return in 1 month.

## 2022-05-26 ENCOUNTER — ALLIED HEALTH/NURSE VISIT (OUTPATIENT)
Dept: NURSING | Facility: CLINIC | Age: 67
End: 2022-05-26
Payer: COMMERCIAL

## 2022-05-26 VITALS
WEIGHT: 220.8 LBS | SYSTOLIC BLOOD PRESSURE: 124 MMHG | DIASTOLIC BLOOD PRESSURE: 70 MMHG | OXYGEN SATURATION: 98 % | HEART RATE: 93 BPM | BODY MASS INDEX: 32.14 KG/M2

## 2022-05-26 DIAGNOSIS — Z01.30 BLOOD PRESSURE CHECK: Primary | ICD-10-CM

## 2022-05-26 PROCEDURE — 99207 PR NO CHARGE NURSE ONLY: CPT

## 2022-05-26 NOTE — PROGRESS NOTES
Nick Villalobos is being followed for Blood Pressure management.      BP Readings from Last 3 Encounters:   05/26/22 124/70   05/11/22 (!) 142/80   01/04/22 134/71       Wt Readings from Last 2 Encounters:   05/26/22 100.2 kg (220 lb 12.8 oz)   05/11/22 104.3 kg (230 lb)       Is pulse 55 or greater? - No    Pulse Readings from Last 3 Encounters:   05/26/22 93   05/11/22 79   01/04/22 52       Current blood pressure medication(s):  Current Outpatient Medications   Medication Sig Dispense Refill     diazepam (VALIUM) 5 MG tablet Take 1 tablet (5 mg) by mouth daily as needed for anxiety (OR restless leg flare) 30 tablet 0          1. Follow up instructions include:     Per PCP.      SUBJECTIVE:                                                    The patient is not taking medication.  Patient is monitoring Blood Pressure at home.   Last 3 home readings - Last night - in the store - 129/86.     Notes he has been eating less sweets/junk food. Trying to get more exercise.     Out of the following complicating factors: Cough, Headache, Lightheadedness, Shortness of breath, Fatigue, Nausea, Sexual Dysfunction, New onset of swelling or edema, Weakness and New onset of Chest Pain, the patient reports:  None    OBJECTIVE:                                                      Today's BP completed using cuff size: regular on right side arm.      Potassium   Date Value Ref Range Status   05/11/2022 4.5 3.4 - 5.3 mmol/L Final   10/25/2018 4.1 3.4 - 5.3 mmol/L Final     Creatinine   Date Value Ref Range Status   05/11/2022 0.83 0.66 - 1.25 mg/dL Final   10/25/2018 1.05 0.66 - 1.25 mg/dL Final     Urea Nitrogen   Date Value Ref Range Status   05/11/2022 18 7 - 30 mg/dL Final   10/25/2018 21 7 - 30 mg/dL Final     GFR Estimate   Date Value Ref Range Status   05/11/2022 >90 >60 mL/min/1.73m2 Final     Comment:     Effective December 21, 2021 eGFRcr in adults is calculated using the 2021 CKD-EPI creatinine equation which includes age  and gender (Kelsy burgos al., NEJM, DOI: 10.1056/RAPCgj0358238)   10/25/2018 72 >60 mL/min/1.7m2 Final     Comment:     Non  GFR Calc         Education:  general discussion/verbal explanation  Ways to help improve BP/HTN:   Medications  Lose weight  Diet low in fat and rich in fruits, vegetables and low fat dairy products  Reduce salt in diet  Do something active for at least 30 minutes a day on most days of the week  Cut down on alcohol (if you drink more than 2 drinks per day)  Decrease stress (exercise, read, yoga, meditation, time for self, etc.)   Patient was given an opportunity to ask questions.    Patient verbalized understanding of this plan and is agreeable.    ANAY JACOME RN

## 2022-06-04 ENCOUNTER — HEALTH MAINTENANCE LETTER (OUTPATIENT)
Age: 67
End: 2022-06-04

## 2022-06-22 ENCOUNTER — LAB (OUTPATIENT)
Dept: LAB | Facility: CLINIC | Age: 67
End: 2022-06-22
Payer: COMMERCIAL

## 2022-06-22 DIAGNOSIS — Z13.1 SCREENING FOR DIABETES MELLITUS: ICD-10-CM

## 2022-06-22 DIAGNOSIS — E34.9 HORMONE DISTURBANCE: ICD-10-CM

## 2022-06-22 DIAGNOSIS — E78.1 HYPERTRIGLYCERIDEMIA: ICD-10-CM

## 2022-06-22 LAB
CHOLEST SERPL-MCNC: 214 MG/DL
FASTING STATUS PATIENT QL REPORTED: YES
FASTING STATUS PATIENT QL REPORTED: YES
GLUCOSE BLD-MCNC: 88 MG/DL (ref 70–99)
HDLC SERPL-MCNC: 60 MG/DL
LDLC SERPL CALC-MCNC: 137 MG/DL
NONHDLC SERPL-MCNC: 154 MG/DL
SHBG SERPL-SCNC: 46 NMOL/L (ref 11–80)
TRIGL SERPL-MCNC: 83 MG/DL

## 2022-06-22 PROCEDURE — 36415 COLL VENOUS BLD VENIPUNCTURE: CPT

## 2022-06-22 PROCEDURE — 82947 ASSAY GLUCOSE BLOOD QUANT: CPT

## 2022-06-22 PROCEDURE — 80061 LIPID PANEL: CPT

## 2022-06-22 PROCEDURE — 84270 ASSAY OF SEX HORMONE GLOBUL: CPT

## 2022-06-22 PROCEDURE — 84403 ASSAY OF TOTAL TESTOSTERONE: CPT

## 2022-06-24 LAB
TESTOST FREE SERPL-MCNC: 11.51 NG/DL
TESTOST SERPL-MCNC: 648 NG/DL (ref 240–950)

## 2022-06-27 NOTE — RESULT ENCOUNTER NOTE
Conner  I have reviewed your recent labs. Here are the results:    -Overall, great news!  Your cholesterol panel looks significantly better than 1 month ago when we did the nonfasting specimen.  LDL(bad) cholesterol level is elevated which can increase your heart disease risk.  A diet high in fat and simple carbohydrates, genetics and being overweight can contribute to this. ADVISE: exercising 150 minutes of aerobic exercise per week (30 minutes for 5 days per week or 50 minutes for 3 days per week are options) and eating a low saturated fat/low carbohydrate diet are helpful to improve this. In 12 months, you should recheck your fasting cholesterol panel by scheduling a lab-only appointment.  -Glucose (diabetic screening test) is normal.  -Testosterone levels are normal        If you have any questions please do not hesitate to contact our office via phone ([phone]) or MyChart.    Rose Webb MBA, MS, PA-C  [facility]

## 2022-07-20 ENCOUNTER — TRANSFERRED RECORDS (OUTPATIENT)
Dept: HEALTH INFORMATION MANAGEMENT | Facility: CLINIC | Age: 67
End: 2022-07-20

## 2022-09-06 ENCOUNTER — OFFICE VISIT (OUTPATIENT)
Dept: FAMILY MEDICINE | Facility: CLINIC | Age: 67
End: 2022-09-06
Payer: COMMERCIAL

## 2022-09-06 VITALS
HEIGHT: 70 IN | RESPIRATION RATE: 18 BRPM | DIASTOLIC BLOOD PRESSURE: 66 MMHG | OXYGEN SATURATION: 97 % | WEIGHT: 216 LBS | TEMPERATURE: 97.5 F | HEART RATE: 68 BPM | SYSTOLIC BLOOD PRESSURE: 136 MMHG | BODY MASS INDEX: 30.92 KG/M2

## 2022-09-06 DIAGNOSIS — R05.9 COUGH: Primary | ICD-10-CM

## 2022-09-06 PROCEDURE — 99213 OFFICE O/P EST LOW 20 MIN: CPT | Performed by: PHYSICIAN ASSISTANT

## 2022-09-06 RX ORDER — BENZONATATE 200 MG/1
200 CAPSULE ORAL 3 TIMES DAILY PRN
Qty: 30 CAPSULE | Refills: 0 | Status: SHIPPED | OUTPATIENT
Start: 2022-09-06 | End: 2023-05-11

## 2022-09-06 NOTE — PROGRESS NOTES
Assessment & Plan     Cough  Patient refused COVID testing today due to firm believes by his wife of possible implantation with nasal swabs.  I tried to  him otherwise, however, he was not interested in being tested.  I told him that due to the prevalence of the COVID-19 virus that without testing and with a normal exam/being afebrile there is no indication to treat with an antibiotic.  I will give him benzonate for symptomatic relief but he would need a negative COVID-19 test for any consideration of antibiotics if his symptoms persist.  Patient apprehensively understood.  All questions answered to his satisfaction.  - benzonatate (TESSALON) 200 MG capsule  Dispense: 30 capsule; Refill: 0  - Symptomatic; Yes; 8/31/2022 COVID-19 Virus (Coronavirus) by PCR Nose    Return in about 1 day (around 9/7/2022) for RN only covid swab if not improving.    DURAN Hernandez [facility] PRIOR OFELIA Prieto is a 66 year old, presenting for the following health issues:  Nasal Congestion      History of Present Illness       Reason for visit:  Cold and lung  Symptom onset:  3-7 days ago  Symptoms include:  Congestion  Symptom intensity:  Moderate  Symptom progression:  Improving  Had these symptoms before:  Yes  Has tried/received treatment for these symptoms:  Yes  Previous treatment was successful:  Yes  Prior treatment description:  Antibiotics  What makes it worse:  Exercise.  What makes it better:  Rest  and green tea    He eats 0-1 servings of fruits and vegetables daily.He consumes 0 sweetened beverage(s) daily.He exercises with enough effort to increase his heart rate 9 or less minutes per day.  He exercises with enough effort to increase his heart rate 3 or less days per week.   He is taking medications regularly.       Review of Systems   Constitutional, HEENT, cardiovascular, pulmonary, GI, , musculoskeletal, neuro, skin, endocrine and psych systems are negative, except as  "otherwise noted.      Objective    /66 (BP Location: Right arm)   Pulse 68   Temp 97.5  F (36.4  C) (Tympanic)   Resp 18   Ht 1.765 m (5' 9.5\")   Wt 98 kg (216 lb)   SpO2 97%   BMI 31.44 kg/m    Body mass index is 31.44 kg/m .  Physical Exam   GENERAL: healthy, alert and no distress  EYES: Eyes grossly normal to inspection, PERRL and conjunctivae and sclerae normal  HENT: ear canals and TM's normal, nose and mouth without ulcers or lesions  NECK: no adenopathy, no asymmetry, masses, or scars and thyroid normal to palpation  RESP: lungs clear to auscultation - no rales, rhonchi or wheezes  CV: regular rate and rhythm, normal S1 S2, no S3 or S4, no murmur, click or rub, no peripheral edema and peripheral pulses strong  MS: no gross musculoskeletal defects noted, no edema  SKIN: no suspicious lesions or rashes  NEURO: Normal strength and tone, mentation intact and speech normal  PSYCH: mentation appears normal, affect normal/bright                  "

## 2022-10-05 ENCOUNTER — OFFICE VISIT (OUTPATIENT)
Dept: DERMATOLOGY | Facility: CLINIC | Age: 67
End: 2022-10-05
Attending: PHYSICIAN ASSISTANT
Payer: COMMERCIAL

## 2022-10-05 DIAGNOSIS — L81.6 POIKILODERMA: Primary | ICD-10-CM

## 2022-10-05 DIAGNOSIS — D23.9 DERMAL NEVUS: ICD-10-CM

## 2022-10-05 DIAGNOSIS — I78.1 TELANGIECTASIA: ICD-10-CM

## 2022-10-05 DIAGNOSIS — B07.9 VIRAL WARTS, UNSPECIFIED TYPE: ICD-10-CM

## 2022-10-05 DIAGNOSIS — L81.4 LENTIGO: ICD-10-CM

## 2022-10-05 DIAGNOSIS — Z85.828 HISTORY OF SKIN CANCER: ICD-10-CM

## 2022-10-05 DIAGNOSIS — D18.01 ANGIOMA OF SKIN: ICD-10-CM

## 2022-10-05 DIAGNOSIS — L82.0 INFLAMED SEBORRHEIC KERATOSIS: ICD-10-CM

## 2022-10-05 DIAGNOSIS — L82.1 SEBORRHEIC KERATOSIS: ICD-10-CM

## 2022-10-05 PROCEDURE — 17111 DESTRUCTION B9 LESIONS 15/>: CPT | Performed by: DERMATOLOGY

## 2022-10-05 PROCEDURE — 96999 UNLISTED SPEC DERM SVC/PX: CPT | Performed by: DERMATOLOGY

## 2022-10-05 PROCEDURE — 99213 OFFICE O/P EST LOW 20 MIN: CPT | Mod: 25 | Performed by: DERMATOLOGY

## 2022-10-05 ASSESSMENT — PAIN SCALES - GENERAL: PAINLEVEL: NO PAIN (0)

## 2022-10-05 NOTE — LETTER
10/5/2022         RE: Nick Villalobos  [address] [facility] [address]        Dear Colleague,    Thank you for referring your patient, Nick Villalobos, to the [facility]. Please see a copy of my visit note below.    Nick Villalobos is an extremely pleasant 66 year old year old male patient here today for rough spot son trunk, blood vessels on nose and mohs scar and legs and brown spots on face and wart on hand.  Wart treated with cryo.  Patient has no other skin complaints today.  Remainder of the HPI, Meds, PMH, Allergies, FH, and SH was reviewed in chart.      Past Medical History:   Diagnosis Date     Basal cell carcinoma      Esophageal reflux      Family history of malignant neoplasm of prostate 5/10/2007    Father at 53       Past Surgical History:   Procedure Laterality Date     COLONOSCOPY      5 year follow up recommended     SURGICAL HISTORY OF -       tendon repair in right hand     [facility]  [facility]/15/2007    Hyperplastic Polyp.  Needs repeat in 5 years        Family History   Problem Relation Age of Onset     Cerebrovascular Disease Mother         at 64     Prostate Cancer Father      Cancer - colorectal Father      C.A.D. 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   Tobacco Use     Smoking status: Former Smoker     Years: 30.00     Quit date: 2001     Years since quittin.8     Smokeless tobacco: Never Used   Vaping Use     Vaping Use: Never used   Substance and Sexual Activity     Alcohol use:  Yes     Comment: 2x per month     Drug use: No     Sexual activity: Yes     Partners: Female   Other Topics Concern      Service Not Asked     Blood Transfusions No     Caffeine Concern No     Occupational Exposure Not Asked     Hobby Hazards Not Asked     Sleep Concern No     Stress Concern Not Asked     Weight Concern Not Asked     Special Diet Not Asked     Back Care Not Asked     Exercise Yes     Comment: Runs 30-40 miles a month     Bike Helmet Not Asked     Seat Belt Yes     Self-Exams Not Asked     Parent/sibling w/ CABG, MI or angioplasty before 65F 55M? Yes   Social History Narrative    [employer]     Social Determinants of Health     Financial Resource Strain: Not on file   Food Insecurity: Not on file   Transportation Needs: Not on file   Physical Activity: Not on file   Stress: Not on file   Social Connections: Not on file   Intimate Partner Violence: Not on file   Housing Stability: Not on file       Outpatient Encounter Medications as of 10/5/2022   Medication Sig Dispense Refill     diazepam (VALIUM) 5 MG tablet Take 1 tablet (5 mg) by mouth daily as needed for anxiety (OR restless leg flare) 30 tablet 0     benzonatate (TESSALON) 200 MG capsule Take 1 capsule (200 mg) by mouth 3 times daily as needed for cough (Patient not taking: Reported on 10/5/2022) 30 capsule 0     No facility-administered encounter medications on file as of 10/5/2022.             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 Seborrheic keratosis, lentigo, angioma, dermal nevus  2. Inflamed seborrheic keratosis trunk 15  LN2:  Treated with LN2 for 5s for 1-2 cycles. Warned risks of blistering, pain, pigment change, scarring, and incomplete resolution.  Advised patient to return if lesions do not completely resolve.  Wound care sheet given.  3. Poikiloderma and telangiectasia   ipl discussed with patient   Cosmetic nature discussed with patient  No charge for mohs given mohs scar   4. Wart  He would like excision  Schedule     It was a pleasure speaking to Nick Villalobos today.  Previous clinic notes and pertinent laboratory tests were reviewed prior to Nick Villalobos's visit.  Nature of benign skin lesions dicussed with patient.  Patient encouraged to perform monthly skin exams.  UV precautions reviewed with patient.  Return to clinic 6 months      PROCEDURE NOTE  After eye protection had been placed, analgesia obtained with cooling IPL was used with vascular filte 19 J 3.5/15 to treat nose (NO charge) and medial thigh.   The ipl was then used aty 560nm 3.5/20 to treat forehead.   An excellent clinical response was obtained and the patient confirmed that all sites had been treated.  The patient was given wound care instructions and will return in 6 months  .            Again, thank you for allowing me to participate in the care of your patient.        Sincerely,        Alcides Quiroz MD

## 2022-10-05 NOTE — PROGRESS NOTES
Nick Villalobos is an extremely pleasant 66 year old year old male patient here today for rough spot son trunk, blood vessels on nose and mohs scar and legs and brown spots on face and wart on hand.  Wart treated with cryo.  Patient has no other skin complaints today.  Remainder of the HPI, Meds, PMH, Allergies, FH, and SH was reviewed in chart.      Past Medical History:   Diagnosis Date     Basal cell carcinoma      Esophageal reflux      Family history of malignant neoplasm of prostate 5/10/2007    Father at 53       Past Surgical History:   Procedure Laterality Date     COLONOSCOPY  2018    5 year follow up recommended     SURGICAL HISTORY OF -       tendon repair in right hand     [facility] COLONOSCOPY THRU STOMA, DIAGNOSTIC  06/15/2007    Hyperplastic Polyp.  Needs repeat in 5 years        Family History   Problem Relation Age of Onset     Cerebrovascular Disease Mother         at 64     Prostate Cancer Father      Cancer - colorectal Father      C.A.D. 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     Occupation: [employer]     Comment: Band   Tobacco Use     Smoking status: Former Smoker     Years: 30.00     Quit date: 2001     Years since quittin.8     Smokeless tobacco: Never Used   Vaping Use     Vaping Use: Never used   Substance and Sexual Activity     Alcohol use: Yes     Comment: 2x per month     Drug use: No     Sexual activity: Yes     Partners: Female   Other Topics Concern      Service Not Asked     Blood Transfusions No     Caffeine Concern No     Occupational Exposure Not Asked     Hobby Hazards Not Asked      Sleep Concern No     Stress Concern Not Asked     Weight Concern Not Asked     Special Diet Not Asked     Back Care Not Asked     Exercise Yes     Comment: Runs 30-40 miles a month     Bike Helmet Not Asked     Seat Belt Yes     Self-Exams Not Asked     Parent/sibling w/ CABG, MI or angioplasty before 65F 55M? Yes   Social History Narrative    [employer]     Social Determinants of Health     Financial Resource Strain: Not on file   Food Insecurity: Not on file   Transportation Needs: Not on file   Physical Activity: Not on file   Stress: Not on file   Social Connections: Not on file   Intimate Partner Violence: Not on file   Housing Stability: Not on file       Outpatient Encounter Medications as of 10/5/2022   Medication Sig Dispense Refill     diazepam (VALIUM) 5 MG tablet Take 1 tablet (5 mg) by mouth daily as needed for anxiety (OR restless leg flare) 30 tablet 0     benzonatate (TESSALON) 200 MG capsule Take 1 capsule (200 mg) by mouth 3 times daily as needed for cough (Patient not taking: Reported on 10/5/2022) 30 capsule 0     No facility-administered encounter medications on file as of 10/5/2022.             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 Seborrheic keratosis, lentigo, angioma, dermal nevus  2. Inflamed seborrheic keratosis trunk 15  LN2:   Treated with LN2 for 5s for 1-2 cycles. Warned risks of blistering, pain, pigment change, scarring, and incomplete resolution.  Advised patient to return if lesions do not completely resolve.  Wound care sheet given.  3. Poikiloderma and telangiectasia   ipl discussed with patient   Cosmetic nature discussed with patient  No charge for mohs given mohs scar   4. Wart  He would like excision  Schedule     It was a pleasure speaking to Nick Villalobos today.  Previous clinic notes and pertinent laboratory tests were reviewed prior to Nick Villalobos's visit.  Nature of benign skin lesions dicussed with patient.  Patient encouraged to perform monthly skin exams.  UV precautions reviewed with patient.  Return to clinic 6 months      PROCEDURE NOTE  After eye protection had been placed, analgesia obtained with cooling IPL was used with vascular filte 19 J 3.5/15 to treat nose (NO charge) and medial thigh.   The ipl was then used aty 560nm 3.5/20 to treat forehead.   An excellent clinical response was obtained and the patient confirmed that all sites had been treated.  The patient was given wound care instructions and will return in 6 months  .

## 2023-01-25 DIAGNOSIS — Z79.899 CONTROLLED SUBSTANCE AGREEMENT SIGNED: ICD-10-CM

## 2023-01-25 DIAGNOSIS — F41.0 ANXIETY ATTACK: ICD-10-CM

## 2023-01-25 DIAGNOSIS — G25.81 RESTLESS LEGS SYNDROME (RLS): ICD-10-CM

## 2023-01-25 RX ORDER — DIAZEPAM 5 MG
5 TABLET ORAL DAILY PRN
Qty: 30 TABLET | Refills: 0 | Status: CANCELLED | OUTPATIENT
Start: 2023-01-25

## 2023-01-25 NOTE — TELEPHONE ENCOUNTER
Last office visit: 9/6/2022     Future Office Visit:        CSA -- Patient Level:     [Media Unavailable] Controlled Substance Agreement - Non - Opioid - Scan on 5/17/2022  9:36 AM: NON-OPIOID CONTROLLED SUBSTANCE AGREEMENT   [Media Unavailable] Controlled Substance Agreement - Non - Opioid - Scan on 9/16/2020 11:52 AM: NON-OPIOID CONTROLLED SUBSTANCE AGREEMENT             Routing refill request to provider for review/approval because:  Drug not on the [facility] refill protocol     Kaela Mendoza RN, BSN  [facility]

## 2023-01-25 NOTE — TELEPHONE ENCOUNTER
Medication Question or Refill    Pt is requesting that refill information is called into his cell phone. And hoping [facility] can inform [facility] when the prescription is sent     What medication are you calling about (include dose and sig)?: diazepam (VALIUM) 5 MG tablet    Controlled Substance Agreement on file:   CSA -- Patient Level:     [Media Unavailable] Controlled Substance Agreement - Non - Opioid - Scan on 5/17/2022  9:36 AM: NON-OPIOID CONTROLLED SUBSTANCE AGREEMENT   [Media Unavailable] Controlled Substance Agreement - Non - Opioid - Scan on 9/16/2020 11:52 AM: NON-OPIOID CONTROLLED SUBSTANCE AGREEMENT       Who prescribed the medication?: pcp    Do you need a refill? Yes    When did you use the medication last? First week of January     Patient offered an appointment? No    Do you have any questions or concerns?  No     Preferred Pharmacy:   [id number] [place], [place] - [address] AT [place] [address] & [address] [address]  Phone: [phone] Fax: [phone]      Could we send this information to you in [facility] or would you prefer to receive a phone call?:   Patient would prefer a phone call   Okay to leave a detailed message?: Yes at Cell number on file:    Telephone Information:   Mobile [phone]

## 2023-01-26 DIAGNOSIS — Z79.899 CONTROLLED SUBSTANCE AGREEMENT SIGNED: ICD-10-CM

## 2023-01-26 DIAGNOSIS — F41.0 ANXIETY ATTACK: ICD-10-CM

## 2023-01-26 DIAGNOSIS — G25.81 RESTLESS LEGS SYNDROME (RLS): ICD-10-CM

## 2023-01-26 RX ORDER — NITROFURANTOIN 25; 75 MG/1; MG/1
100 CAPSULE ORAL EVERY 12 HOURS
COMMUNITY
Start: 2022-12-27 | End: 2023-05-11

## 2023-01-26 RX ORDER — TAMSULOSIN HYDROCHLORIDE 0.4 MG/1
0.4 CAPSULE ORAL DAILY
COMMUNITY
Start: 2022-12-22 | End: 2023-05-11

## 2023-01-26 RX ORDER — DIAZEPAM 5 MG
5 TABLET ORAL DAILY PRN
Qty: 10 TABLET | Refills: 0 | Status: SHIPPED | OUTPATIENT
Start: 2023-01-26 | End: 2023-05-11

## 2023-01-26 NOTE — PROGRESS NOTES
10 tablets diazepam sent to pharmacy.  Patient due for fasting physical/med check/controlled substance agreement renewal for further refills.  Please inform patient and offer to schedule.      Rose Webb MBA, MS, PA-C  [facility]

## 2023-01-26 NOTE — PROGRESS NOTES
Left non-detailed message for patient to call back.  Please schedule physical ( annual visit)  when patient calls back.  (see previous notes for details)    Thanks Natali

## 2023-05-11 ENCOUNTER — OFFICE VISIT (OUTPATIENT)
Dept: FAMILY MEDICINE | Facility: CLINIC | Age: 68
End: 2023-05-11
Payer: COMMERCIAL

## 2023-05-11 VITALS
TEMPERATURE: 96.5 F | RESPIRATION RATE: 18 BRPM | OXYGEN SATURATION: 97 % | WEIGHT: 227.7 LBS | BODY MASS INDEX: 32.6 KG/M2 | DIASTOLIC BLOOD PRESSURE: 68 MMHG | HEIGHT: 70 IN | SYSTOLIC BLOOD PRESSURE: 130 MMHG | HEART RATE: 56 BPM

## 2023-05-11 DIAGNOSIS — Z00.00 ENCOUNTER FOR MEDICARE ANNUAL WELLNESS EXAM: ICD-10-CM

## 2023-05-11 DIAGNOSIS — Z13.29 SCREENING FOR THYROID DISORDER: ICD-10-CM

## 2023-05-11 DIAGNOSIS — Z13.0 SCREENING FOR DEFICIENCY ANEMIA: ICD-10-CM

## 2023-05-11 DIAGNOSIS — Z79.899 CONTROLLED SUBSTANCE AGREEMENT SIGNED: ICD-10-CM

## 2023-05-11 DIAGNOSIS — Z12.5 SCREENING FOR PROSTATE CANCER: ICD-10-CM

## 2023-05-11 DIAGNOSIS — Z13.1 SCREENING FOR DIABETES MELLITUS: ICD-10-CM

## 2023-05-11 DIAGNOSIS — E78.5 HYPERLIPIDEMIA LDL GOAL <100: ICD-10-CM

## 2023-05-11 DIAGNOSIS — G47.00 INSOMNIA, UNSPECIFIED TYPE: ICD-10-CM

## 2023-05-11 DIAGNOSIS — R03.0 ELEVATED BP WITHOUT DIAGNOSIS OF HYPERTENSION: ICD-10-CM

## 2023-05-11 DIAGNOSIS — G25.81 RESTLESS LEGS SYNDROME (RLS): ICD-10-CM

## 2023-05-11 DIAGNOSIS — D12.6 TUBULAR ADENOMA OF COLON: ICD-10-CM

## 2023-05-11 DIAGNOSIS — Z12.11 SCREEN FOR COLON CANCER: ICD-10-CM

## 2023-05-11 DIAGNOSIS — F41.0 ANXIETY ATTACK: ICD-10-CM

## 2023-05-11 DIAGNOSIS — N18.2 CKD (CHRONIC KIDNEY DISEASE) STAGE 2, GFR 60-89 ML/MIN: ICD-10-CM

## 2023-05-11 DIAGNOSIS — Z00.00 ROUTINE GENERAL MEDICAL EXAMINATION AT A HEALTH CARE FACILITY: Primary | ICD-10-CM

## 2023-05-11 DIAGNOSIS — Z82.49 FAMILY HISTORY OF ISCHEMIC HEART DISEASE: ICD-10-CM

## 2023-05-11 PROBLEM — E78.1 HYPERTRIGLYCERIDEMIA: Status: RESOLVED | Noted: 2022-05-16 | Resolved: 2023-05-11

## 2023-05-11 PROCEDURE — 99214 OFFICE O/P EST MOD 30 MIN: CPT | Mod: 25 | Performed by: PHYSICIAN ASSISTANT

## 2023-05-11 PROCEDURE — 99397 PER PM REEVAL EST PAT 65+ YR: CPT | Performed by: PHYSICIAN ASSISTANT

## 2023-05-11 RX ORDER — DIAZEPAM 5 MG
5 TABLET ORAL DAILY PRN
Qty: 30 TABLET | Refills: 0 | Status: SHIPPED | OUTPATIENT
Start: 2023-05-11 | End: 2023-12-27

## 2023-05-11 RX ORDER — OXYMETAZOLINE HCL 0.05 %
1 SPRAY, NON-AEROSOL (ML) NASAL AT BEDTIME
Start: 2023-05-11

## 2023-05-11 ASSESSMENT — ENCOUNTER SYMPTOMS
HEADACHES: 0
DIARRHEA: 0
PARESTHESIAS: 0
SORE THROAT: 0
CHILLS: 0
COUGH: 0
WEAKNESS: 0
ABDOMINAL PAIN: 0
CONSTIPATION: 0
HEARTBURN: 0
HEMATOCHEZIA: 0
PALPITATIONS: 0
DYSURIA: 0
FEVER: 0
MYALGIAS: 0
FREQUENCY: 0
SHORTNESS OF BREATH: 0
EYE PAIN: 0
NAUSEA: 0
JOINT SWELLING: 0
HEMATURIA: 0
NERVOUS/ANXIOUS: 0
ARTHRALGIAS: 1
DIZZINESS: 0

## 2023-05-11 ASSESSMENT — ACTIVITIES OF DAILY LIVING (ADL): CURRENT_FUNCTION: NO ASSISTANCE NEEDED

## 2023-05-11 NOTE — PATIENT INSTRUCTIONS
Preventive Health Recommendations:     See your health care provider every year to    Review health changes.     Discuss preventive care.      Review your medicines if your doctor has prescribed any.      Talk with your health care provider about whether you should have a test to screen for prostate cancer (PSA).    Every 3 years, have a diabetes test (fasting glucose). If you are at risk for diabetes, you should have this test more often.    Every 5 years, have a cholesterol test. Have this test more often if you are at risk for high cholesterol or heart disease.     Every 10 years, have a colonoscopy. Or, have a yearly FIT test (stool test). These exams will check for colon cancer.    Talk to with your health care provider about screening for Abdominal Aortic Aneurysm if you have a family history of AAA or have a history of smoking.    Shots:     Get a flu shot each year.     Get a tetanus shot every 10 years.     Talk to your doctor about your pneumonia vaccines. There are now two you should receive - Pneumovax (PPSV 23) and Prevnar (PCV 13).     Talk to your pharmacist about a shingles vaccine.     Talk to your doctor about the hepatitis B vaccine.  Nutrition:     Eat at least 5 servings of fruits and vegetables each day.     Eat whole-grain bread, whole-wheat pasta and brown rice instead of white grains and rice.     Get adequate Calcium and Vitamin D.   Lifestyle    Exercise for at least 150 minutes a week (30 minutes a day, 5 days a week). This will help you control your weight and prevent disease.     Limit alcohol to one drink per day.     No smoking.     Wear sunscreen to prevent skin cancer.    See your dentist every six months for an exam and cleaning.    See your eye doctor every 1 to 2 years to screen for conditions such as glaucoma, macular degeneration, cataracts, etc.    Personalized Prevention Plan  You are due for the preventive services outlined below.  Your care team is available to assist you  in scheduling these services.  If you have already completed any of these items, please share that information with your care team to update in your medical record.  Health Maintenance Due   Topic Date Due     ANNUAL REVIEW OF HM ORDERS  Never done     Zoster (Shingles) Vaccine (1 of 2) Never done     Pneumococcal Vaccine (1 - PCV) Never done     AORTIC ANEURYSM SCREENING (SYSTEM ASSIGNED)  Never done     Basic Metabolic Panel  05/11/2023     Kidney Microalbumin Urine Test  05/11/2023     Patient Education   Personalized Prevention Plan  You are due for the preventive services outlined below.  Your care team is available to assist you in scheduling these services.  If you have already completed any of these items, please share that information with your care team to update in your medical record.  Health Maintenance Due   Topic Date Due     Zoster (Shingles) Vaccine (1 of 2) Never done     Pneumococcal Vaccine (1 - PCV) Never done     AORTIC ANEURYSM SCREENING (SYSTEM ASSIGNED)  Never done     Comprehensive Metabolic Panel  05/11/2023     Kidney Microalbumin Urine Test  05/11/2023     Complete Blood Count  05/11/2023       Understanding USDA MyPlate  The USDA has guidelines to help you make healthy food choices. These are called MyPlate. MyPlate shows the food groups that make up healthy meals using the image of a place setting. Before you eat, think about the healthiest choices for what to put on your plate or in your cup or bowl. To learn more about building a healthy plate, visit www.choosemyplate.gov.     The food groups    Fruits. Any fruit or 100% fruit juice counts as part of the Fruit Group. Fruits may be fresh, canned, frozen, or dried, and may be whole, cut-up, or pureed. Make 1/2 of your plate fruits and vegetables.    Vegetables. Any vegetable or 100% vegetable juice counts as a member of the Vegetable Group. Vegetables may be fresh, frozen, canned, or dried. They can be served raw or cooked and may be  whole, cut-up, or mashed. Make 1/2 of your plate fruits and vegetables.    Grains. All foods made from grains are part of the Grains Group. These include wheat, rice, oats, cornmeal, and barley. Grains are often used to make foods such as bread, pasta, oatmeal, cereal, tortillas, and grits. Grains should be no more than 1/4 of your plate. At least half of your grains should be whole grains.    Protein. This group includes meat, poultry, seafood, beans and peas, eggs, processed soy products (such as tofu), nuts (including nut butters), and seeds. Make protein choices no more than 1/4 of your plate. Meat and poultry choices should be lean or low fat.    Dairy. The Dairy Group includes all fluid milk products and foods made from milk that contain calcium, such as yogurt and cheese. (Foods that have little calcium, such as cream, butter, and cream cheese, are not part of this group.) Most dairy choices should be low-fat or fat-free.    Oils. Oils aren't a food group, but they do contain essential nutrients. However it's important to watch your intake of oils. These are fats that are liquid at room temperature. They include canola, corn, olive, soybean, vegetable, and sunflower oil. Foods that are mainly oil include mayonnaise, certain salad dressings, and soft margarines. You likely already get your daily oil allowance from the foods you eat.  Things to limit  Eating healthy also means limiting these things in your diet:    Salt (sodium). Many processed foods have a lot of sodium. To keep sodium intake down, eat fresh vegetables, meats, poultry, and seafood when possible. Purchase low-sodium, reduced-sodium, or no-salt-added food products at the store. And don't add salt to your meals at home. Instead, season them with herbs and spices such as dill, oregano, cumin, and paprika. Or try adding flavor with lemon or lime zest and juice.    Saturated fat. Saturated fats are most often found in animal products such as beef,  pork, and chicken. They are often solid at room temperature, such as butter. To reduce your saturated fat intake, choose leaner cuts of meat and poultry. And try healthier cooking methods such as grilling, broiling, roasting, or baking. For a simple lower-fat swap, use plain nonfat yogurt instead of mayonnaise when making potato salad or macaroni salad.    Added sugars. These are sugars added to foods. They are in foods such as ice cream, candy, soda, fruit drinks, sports drinks, energy drinks, cookies, pastries, jams, and syrups. Cut down on added sugars by sharing sweet treats with a family member or friend. You can also choose fruit for dessert, and drink water or other unsweetened beverages.  [employer] last reviewed this educational content on 6/1/2020 2000-2022 [employer]. All rights reserved. This information is not intended as a substitute for professional medical care. Always follow your healthcare professional's instructions.          Signs of Hearing Loss  Hearing loss is a problem shared by many people. In fact, it's one of the most common health problems, particularly as people age. Most people aged 65 and older have some hearing loss. By age 80, almost everyone does. Hearing loss often occurs slowly over the years. So, you may not realize your hearing has gotten worse.   When sudden hearing loss occurs, it's important to contact your healthcare provider right away. Your provider will do a medical exam and a hearing exam as soon as possible. This is to help find the cause and type of your sudden hearing loss. Based on your diagnosis, your healthcare provider will discuss possible treatments.      Hearing much better with one ear can be a sign of hearing loss.     Have your hearing checked  Call your healthcare provider if you:     Have to strain to hear normal conversation    Have to watch other people s faces very carefully to follow what they re saying    Need to ask people to repeat  what they ve said    Often misunderstand what people are saying    Turn the volume of the television or radio up so high that others complain    Feel that people are mumbling when they re talking to you    Find that the effort to hear leaves you feeling tired and irritated    Notice, when using the phone, that you hear better with one ear than the other  [employer] last reviewed this educational content on 6/1/2022 2000-2022 [employer]. All rights reserved. This information is not intended as a substitute for professional medical care. Always follow your healthcare professional's instructions.

## 2023-05-11 NOTE — LETTER
[facility] [place]  05/11/23  Patient: Nick Villalobos  YOB: 1955  Medical Record Number: [id number]                                                                                  Non-Opioid Controlled Substance Agreement    This is an agreement between you and your provider regarding safe and appropriate use of controlled substances prescribed by your care team. Controlled substances are?medicines that can cause physical and mental dependence (abuse).     There are strict laws about having and using these medicines. We here at [facility] are  committed to working with you in your efforts to get better. To support you in this work, we'll help you schedule regular office appointments for medicine refills. If we must cancel or change your appointment for any reason, we'll make sure you have enough medicine to last until your next appointment.     As a Provider, I will:   Listen carefully to your concerns while treating you with respect.   Recommend a treatment plan that I believe is in your best interest and may involve therapies other than medicine.    Talk with you often about the possible benefits and the risk of harm of any medicine that we prescribe for you.  Assess the safety of this medicine and check how well it works.    Provide a plan on how to taper (discontinue or go off) using this medicine if the decision is made to stop its use.      ::  As a Patient, I understand controlled substances:     Are prescribed by my care provider to help me function or work and manage my condition(s).?  Are strong medicines and can cause serious side effects.     Need to be taken exactly as prescribed.?Combining controlled substances with certain medicines or chemicals (such as illegal drugs, alcohol, sedatives, sleeping pills, and benzodiazepines) can be dangerous or even fatal.? If I stop taking my medicines suddenly, I may have severe withdrawal symptoms.     The risks,  benefits, and side effects of these medicine(s) were explained to me. I agree that:    I will take part in other treatments as advised by my care team. This may be psychiatry or counseling, physical therapy, behavioral therapy, group treatment or a referral to specialist.    I will keep all my appointments and understand this is part of the monitoring of controlled substances.?My care team may require an office visit for EVERY controlled substance refill. If I miss appointments or don t follow instructions, my care team may stop my medicine    I will take my medicines as prescribed. I will not change the dose or schedule unless my care team tells me to. There will be no refills if I run out early.      I may be asked to come to the clinic and complete a urine drug test or complete a pill count. If I don t give a urine sample or participate in a pill count, the care team may stop my medicine.    I will only receive controlled substance prescriptions from this clinic. If I am treated by another provider, I will tell them that I am taking controlled substances and that I have a treatment agreement with this provider. I will inform my [facility] care team within one business day if I am given a prescription for any controlled substance by another healthcare provider. My [facility] care team can contact other providers and pharmacists about my use of any medicines.    It is up to me to make sure that I don't run out of my medicines on weekends or holidays.?If my care team is willing to refill my prescription without a visit, I must request refills only during office hours. Refills may take up to 3 business days to process. I will use one pharmacy to fill all my controlled substance prescriptions. I will notify the clinic about any changes to my insurance or medicine availability.    I am responsible for my prescriptions. If the medicine/prescription is lost, stolen or destroyed, it will not be replaced.?I  also agree not to share controlled substance medicines with anyone.     I am aware I should not use any illegal or recreational drugs. I agree not to drink alcohol unless my care team says I can.     If I enroll in the [place] [facility] program, I will tell my care team before my next refill.    I will tell my care team right away if I become pregnant, have a new medical problem treated outside of my regular clinic, or have a change in my medicines.     I understand that this medicine can affect my thinking, judgment and reaction time.? Alcohol and drugs affect the brain and body, which can affect the safety of my driving. Being under the influence of alcohol or drugs can affect my decision-making, behaviors, personal safety and the safety of others. Driving while impaired (DWI) can occur if a person is driving, operating or in physical control of a car, motorcycle, boat, snowmobile, ATV, motorbike, off-road vehicle or any other motor vehicle ([place] Statute 169A.20). I understand the risk if I choose to drive or operate any vehicle or machinery.    I understand that if I do not follow any of the conditions above, my prescriptions or treatment may be stopped or changed.   I agree that my provider, clinic care team and pharmacy may work with any city, state or federal law enforcement agency that investigates the misuse, sale or other diversion of my controlled medicine. I will allow my provider to discuss my care with, or share a copy of, this agreement with any other treating provider, pharmacy or emergency room where I receive care.     I have read this agreement and have asked questions about anything I did not understand.    ________________________________________________________  Patient Signature - Nick Villalobos     ___________________                   Date     ________________________________________________________  Provider Signature - Rose Webb PA-C       ___________________                    Date     ________________________________________________________  Witness Signature (required if provider not present while patient signing)          ___________________                   Date

## 2023-05-11 NOTE — PROGRESS NOTES
"SUBJECTIVE:   Conner is a 67 year old who presents for Preventive Visit.      5/11/2023    12:39 PM   Additional Questions   Roomed by Lexi Painter CMA   Accompanied by Self   Patient has been advised of split billing requirements and indicates understanding: Yes  Are you in the first 12 months of your Medicare coverage?  No    Healthy Habits:     In general, how would you rate your overall health?  Good    Frequency of exercise:  2-3 days/week    Duration of exercise:  Other    Do you usually eat at least 4 servings of fruit and vegetables a day, include whole grains    & fiber and avoid regularly eating high fat or \"junk\" foods?  No    Taking medications regularly:  Yes    Medication side effects:  Not applicable    Ability to successfully perform activities of daily living:  No assistance needed    Home Safety:  No safety concerns identified    Hearing Impairment:  Difficulty following a conversation in a noisy restaurant or crowded room and difficulty understanding soft or whispered speech    In the past 6 months, have you been bothered by leaking of urine?  No    In general, how would you rate your overall mental or emotional health?  Excellent      PHQ-2 Total Score: 0    Additional concerns today:  Yes      Have you ever done Advance Care Planning? (For example, a Health Directive, POLST, or a discussion with a medical provider or your loved ones about your wishes): No, advance care planning information given to patient to review.  Patient declined advance care planning discussion at this time.    Anxiety & RLS  Rare anxiety attacks.  When he has them they are quite severe.  Historically has used valium very sparingly. Additionally has RLS that is intermittent but very bothersome -especially when he tries to get on an airplane.  Valium has worked well for this.  Does not have on a daily basis.  Has a new leg massaging machine that has helped his symptoms significantly.   Ferritin level normal 2018.  Last RX " for Valium was in 1/26/2023 for #10.  30 tablets historically has lasted 3-4 months.         Sleep disruption  Patient reports that he is waking in the night more often.  He states that he does not snore.  He is wondering if his restless leg is contributing.  He has never had a sleep study.  This was ordered at his annual visit last year but he never scheduled this.    BPH/lower urinary symptoms  Patient was seen by [facility] December 2022 for urinary symptoms.  Was diagnosed with BPH and a urinary tract infection.  Treated with nitrofurantoin and given 90 days of Flomax.  This was causing some sexual dysfunction so he discontinued this medication.  He is doing well off of medication at this point in time.     Elevated BP  Is not monitoring regularly.  Has gained weight over the last year and with the pandemic.  Feels that when he exercises regularly this is quite helpful.  Is starting to return to weightlifting and running which typically make him feel quite well.     Wt Readings from Last 5 Encounters:   05/11/23 103.3 kg (227 lb 11.2 oz)   09/06/22 98 kg (216 lb)   05/26/22 100.2 kg (220 lb 12.8 oz)   05/11/22 104.3 kg (230 lb)   09/08/20 99.8 kg (220 lb)      Fall risk  Fallen 2 or more times in the past year?: No  Any fall with injury in the past year?: No    Cognitive Screening   1) Repeat 3 items (Leader, Season, Table)    2) Clock draw: NORMAL  3) 3 item recall: Recalls 3 objects  Results: 3 items recalled: COGNITIVE IMPAIRMENT LESS LIKELY    Mini-CogTM Copyright S Pat. Licensed by the author for use in [facility]; reprinted with permission (haley@.[email or web address]). All rights reserved.      Do you have sleep apnea, excessive snoring or daytime drowsiness?: no    Reviewed and updated as needed this visit by clinical staff   Tobacco  Allergies  Meds  Problems  Med Hx  Surg Hx  Fam Hx          Reviewed and updated as needed this visit by Provider   Tobacco  Allergies  Meds  Problems   Med Hx  Surg Hx  Fam Hx         Social History     Tobacco Use     Smoking status: Former     Years: 30.00     Types: Cigarettes     Quit date: 2001     Years since quittin.4     Smokeless tobacco: Never   Vaping Use     Vaping status: Never Used   Substance Use Topics     Alcohol use: Yes     Comment: 2x per month             2023    12:51 PM   Alcohol Use   Prescreen: >3 drinks/day or >7 drinks/week? No          View : No data to display.              Do you have a current opioid prescription? No  Do you use any other controlled substances or medications that are not prescribed by a provider? None      Current providers sharing in care for this patient include:   Patient Care Team:  Rose Webb PA-C as PCP - General (Physician Assistant)  Rose Webb PA-C as Assigned PCP  Alcides Quiroz MD as Assigned Surgical Provider    The following health maintenance items are reviewed in Epic and correct as of today:  Health Maintenance   Topic Date Due     ANNUAL REVIEW OF  ORDERS  Never done     ZOSTER IMMUNIZATION (1 of 2) Never done     Pneumococcal Vaccine: 65+ Years (1 - PCV) Never done     AORTIC ANEURYSM SCREENING (SYSTEM ASSIGNED)  Never done     BMP  2023     MICROALBUMIN  2023     COVID-19 Vaccine (1) 2032 (Originally 1956)     LIPID  2023     COLORECTAL CANCER SCREENING  2023     MEDICARE ANNUAL WELLNESS VISIT  2024     FALL RISK ASSESSMENT  2024     PSA  2025     ADVANCE CARE PLANNING  2028     DTAP/TDAP/TD IMMUNIZATION (3 - Td or Tdap) 2028     HEPATITIS C SCREENING  Completed     PHQ-2 (once per calendar year)  Completed     URINALYSIS  Completed     IPV IMMUNIZATION  Aged Out     MENINGITIS IMMUNIZATION  Aged Out     INFLUENZA VACCINE  Discontinued     BP Readings from Last 3 Encounters:   23 130/68   22 136/66   22 124/70    Wt Readings from Last 3 Encounters:   23 103.3 kg  (227 lb 11.2 oz)   22 98 kg (216 lb)   22 100.2 kg (220 lb 12.8 oz)                  Patient Active Problem List   Diagnosis     Family history of malignant neoplasm of prostate     Esophageal reflux     Advanced directives, counseling/discussion     Hyperlipidemia LDL goal <100     Restless legs syndrome (RLS)     CKD (chronic kidney disease) stage 2, GFR 60-89 ml/min     Elevated BP without diagnosis of hypertension     Tubular adenoma of colon - 2018 - 5 year follow up recommended.     Controlled substance agreement signed - Diazepam 5 mg #30 every 6 months for severe RLS flares and anxiety attacks     Anxiety attack     Family history of ischemic heart disease     Insomnia, unspecified type     Past Surgical History:   Procedure Laterality Date     COLONOSCOPY  2018    5 year follow up recommended     SURGICAL HISTORY OF -       tendon repair in right hand     [facility]  06/15/2007    Hyperplastic Polyp.  Needs repeat in 5 years       Social History     Tobacco Use     Smoking status: Former     Years: 30.00     Types: Cigarettes     Quit date: 2001     Years since quittin.4     Smokeless tobacco: Never   Vaping Use     Vaping status: Never Used   Substance Use Topics     Alcohol use: Yes     Comment: 2x per month     Family History   Problem Relation Age of Onset     Cerebrovascular Disease Mother         at 64     Prostate Cancer Father      Cancer - colorectal Father      C.A.D. Father          at 67, presumed MI     Hypertension Father      Brain Hemorrhage Brother         trauma     Skin Cancer Paternal Grandmother          Current Outpatient Medications   Medication Sig Dispense Refill     diazepam (VALIUM) 5 MG tablet Take 1 tablet (5 mg) by mouth daily as needed for anxiety (OR restless leg flare) 30 tablet 0     diphenhydrAMINE HCl, Sleep, (ZZZQUIL) 25 MG CAPS Take 1 each by mouth At Bedtime Take per package instructions         Review of Systems  "  Constitutional: Negative for chills and fever.   HENT: Positive for hearing loss. Negative for congestion, ear pain and sore throat.    Eyes: Positive for visual disturbance. Negative for pain.   Respiratory: Negative for cough and shortness of breath.    Cardiovascular: Negative for chest pain, palpitations and peripheral edema.   Gastrointestinal: Negative for abdominal pain, constipation, diarrhea, heartburn, hematochezia and nausea.   Genitourinary: Negative for dysuria, frequency, genital sores, hematuria, impotence, penile discharge and urgency.   Musculoskeletal: Positive for arthralgias. Negative for joint swelling and myalgias.   Skin: Negative for rash.   Neurological: Negative for dizziness, weakness, headaches and paresthesias.   Psychiatric/Behavioral: Negative for mood changes. The patient is not nervous/anxious.        OBJECTIVE:   /68   Pulse 56   Temp (!) 96.5  F (35.8  C) (Tympanic)   Resp 18   Ht 1.765 m (5' 9.5\")   Wt 103.3 kg (227 lb 11.2 oz)   SpO2 97%   BMI 33.14 kg/m   Estimated body mass index is 33.14 kg/m  as calculated from the following:    Height as of this encounter: 1.765 m (5' 9.5\").    Weight as of this encounter: 103.3 kg (227 lb 11.2 oz).  Physical Exam  GENERAL: healthy, alert and no distress  EYES: Eyes grossly normal to inspection, PERRL and conjunctivae and sclerae normal  HENT: ear canals and TM's normal, nose and mouth without ulcers or lesions  NECK: no adenopathy, no asymmetry, masses, or scars and thyroid normal to palpation  RESP: lungs clear to auscultation - no rales, rhonchi or wheezes  CV: regular rate and rhythm, normal S1 S2, no S3 or S4, no murmur, click or rub, no peripheral edema and peripheral pulses strong  ABDOMEN: soft, nontender, no hepatosplenomegaly, no masses and bowel sounds normal  MS: no gross musculoskeletal defects noted, no edema  SKIN: no suspicious lesions or rashes  NEURO: Normal strength and tone, mentation intact and speech " normal  PSYCH: mentation appears normal, affect normal/bright    Diagnostic Test Results:  No results found for any visits on 05/11/23.    ASSESSMENT / PLAN:   Nick was seen today for physical.    Diagnoses and all orders for this visit:    Routine general medical examination at a health care facility  -     REVIEW OF HEALTH MAINTENANCE PROTOCOL ORDERS    Anxiety attack  Restless legs syndrome (RLS)  Controlled substance agreement signed - Diazepam 5 mg #30 every 6 months for severe RLS flares and anxiety attacks  Uses sparingly.  Refilled today.  CSA resigned today.  Patient understands that we need to have visits every 6 months due to the controlled substance nature of this medication.  -     diazepam (VALIUM) 5 MG tablet; Take 1 tablet (5 mg) by mouth daily as needed for anxiety (OR restless leg flare)    Family history of ischemic heart disease  Hyperlipidemia LDL goal <100  CKD (chronic kidney disease) stage 2, GFR 60-89 ml/min  Elevated BP without diagnosis of hypertension  Patient interested in preventative cardiology consultation due to family history.  Referral placed today.  -     Adult Cardiology Eval  Referral; Future  -     Lipid panel reflex to direct LDL Fasting; Future    Tubular adenoma of colon - 2018 - 5 year follow up recommended.  Screen for colon cancer  Routine screening due follow-up 2023.  Patient will schedule.  -     Colonoscopy Screening  Referral; Future    Insomnia, unspecified type  Suboptimally controlled.  Formal sleep study recommended especially in light of known restless leg.  Recommend trial of ZzzQuil.  Sleep hygiene also encouraged.  Patient will keep me apprised if this is not helpful for symptoms.  -     diphenhydrAMINE HCl, Sleep, (ZZZQUIL) 25 MG CAPS; Take 1 each by mouth At Bedtime Take per package instructions  -     Adult Sleep Eval & Management  Referral; Future    Screening for prostate cancer  Screening for deficiency anemia  Screening  "for diabetes mellitus  Screening for thyroid disorder  Routine screening  -     PSA, screen; Future  -     CBC with platelets; Future  -     Comprehensive metabolic panel (BMP + Alb, Alk Phos, ALT, AST, Total. Bili, TP); Future  -     TSH with free T4 reflex; Future        Patient has been advised of split billing requirements and indicates understanding: Yes      COUNSELING:  Reviewed preventive health counseling, as reflected in patient instructions       Regular exercise       Healthy diet/nutrition       Immunizations    Declined: Covid-19, Pneumococcal and Zoster due to Concerns about side effects/safety              BMI:   Estimated body mass index is 33.14 kg/m  as calculated from the following:    Height as of this encounter: 1.765 m (5' 9.5\").    Weight as of this encounter: 103.3 kg (227 lb 11.2 oz).   Weight management plan: Discussed healthy diet and exercise guidelines      He reports that he quit smoking about 21 years ago. He has never used smokeless tobacco.      Appropriate preventive services were discussed with this patient, including applicable screening as appropriate for cardiovascular disease, diabetes, osteopenia/osteoporosis, and glaucoma.  As appropriate for age/gender, discussed screening for colorectal cancer, prostate cancer, breast cancer, and cervical cancer. Checklist reviewing preventive services available has been given to the patient.    Reviewed patients plan of care and provided an AVS. The Basic Care Plan (routine screening as documented in Health Maintenance) for Nick meets the Care Plan requirement. This Care Plan has been established and reviewed with the Patient.      Rose Webb PA-C  [facility] [place]    Identified Health Risks:    I have reviewed Opioid Use Disorder and Substance Use Disorder risk factors and made any needed referrals.       The patient was counseled and encouraged to consider modifying their diet and eating habits. He was " provided with information on recommended healthy diet options.  The patient was provided with written information regarding signs of hearing loss.

## 2023-06-07 ENCOUNTER — LAB (OUTPATIENT)
Dept: LAB | Facility: CLINIC | Age: 68
End: 2023-06-07
Payer: COMMERCIAL

## 2023-06-07 DIAGNOSIS — Z12.5 SCREENING FOR PROSTATE CANCER: ICD-10-CM

## 2023-06-07 DIAGNOSIS — E78.5 HYPERLIPIDEMIA LDL GOAL <100: ICD-10-CM

## 2023-06-07 DIAGNOSIS — Z13.29 SCREENING FOR THYROID DISORDER: ICD-10-CM

## 2023-06-07 DIAGNOSIS — Z13.1 SCREENING FOR DIABETES MELLITUS: ICD-10-CM

## 2023-06-07 DIAGNOSIS — N18.2 CKD (CHRONIC KIDNEY DISEASE) STAGE 2, GFR 60-89 ML/MIN: ICD-10-CM

## 2023-06-07 DIAGNOSIS — Z13.0 SCREENING FOR DEFICIENCY ANEMIA: ICD-10-CM

## 2023-06-07 LAB
ALBUMIN SERPL BCG-MCNC: 3.9 G/DL (ref 3.5–5.2)
ALP SERPL-CCNC: 49 U/L (ref 40–129)
ALT SERPL W P-5'-P-CCNC: 24 U/L (ref 10–50)
ANION GAP SERPL CALCULATED.3IONS-SCNC: 10 MMOL/L (ref 7–15)
AST SERPL W P-5'-P-CCNC: 22 U/L (ref 10–50)
BILIRUB SERPL-MCNC: 0.5 MG/DL
BUN SERPL-MCNC: 21.9 MG/DL (ref 8–23)
CALCIUM SERPL-MCNC: 9.1 MG/DL (ref 8.8–10.2)
CHLORIDE SERPL-SCNC: 106 MMOL/L (ref 98–107)
CHOLEST SERPL-MCNC: 225 MG/DL
CREAT SERPL-MCNC: 1.01 MG/DL (ref 0.67–1.17)
CREAT UR-MCNC: 218 MG/DL
DEPRECATED HCO3 PLAS-SCNC: 23 MMOL/L (ref 22–29)
ERYTHROCYTE [DISTWIDTH] IN BLOOD BY AUTOMATED COUNT: 12.2 % (ref 10–15)
GFR SERPL CREATININE-BSD FRML MDRD: 82 ML/MIN/1.73M2
GLUCOSE SERPL-MCNC: 90 MG/DL (ref 70–99)
HCT VFR BLD AUTO: 43.9 % (ref 40–53)
HDLC SERPL-MCNC: 55 MG/DL
HGB BLD-MCNC: 14.6 G/DL (ref 13.3–17.7)
LDLC SERPL CALC-MCNC: 144 MG/DL
MCH RBC QN AUTO: 29.6 PG (ref 26.5–33)
MCHC RBC AUTO-ENTMCNC: 33.3 G/DL (ref 31.5–36.5)
MCV RBC AUTO: 89 FL (ref 78–100)
MICROALBUMIN UR-MCNC: <12 MG/L
MICROALBUMIN/CREAT UR: NORMAL MG/G{CREAT}
NONHDLC SERPL-MCNC: 170 MG/DL
PLATELET # BLD AUTO: 223 10E3/UL (ref 150–450)
POTASSIUM SERPL-SCNC: 4.1 MMOL/L (ref 3.4–5.3)
PROT SERPL-MCNC: 6.8 G/DL (ref 6.4–8.3)
PSA SERPL DL<=0.01 NG/ML-MCNC: 3.23 NG/ML (ref 0–4.5)
RBC # BLD AUTO: 4.93 10E6/UL (ref 4.4–5.9)
SODIUM SERPL-SCNC: 139 MMOL/L (ref 136–145)
TRIGL SERPL-MCNC: 128 MG/DL
TSH SERPL DL<=0.005 MIU/L-ACNC: 2.09 UIU/ML (ref 0.3–4.2)
WBC # BLD AUTO: 4.8 10E3/UL (ref 4–11)

## 2023-06-07 PROCEDURE — 82043 UR ALBUMIN QUANTITATIVE: CPT

## 2023-06-07 PROCEDURE — 80061 LIPID PANEL: CPT

## 2023-06-07 PROCEDURE — 82570 ASSAY OF URINE CREATININE: CPT

## 2023-06-07 PROCEDURE — 80053 COMPREHEN METABOLIC PANEL: CPT

## 2023-06-07 PROCEDURE — G0103 PSA SCREENING: HCPCS

## 2023-06-07 PROCEDURE — 36415 COLL VENOUS BLD VENIPUNCTURE: CPT

## 2023-06-07 PROCEDURE — 85027 COMPLETE CBC AUTOMATED: CPT

## 2023-06-07 PROCEDURE — 84443 ASSAY THYROID STIM HORMONE: CPT

## 2023-06-08 NOTE — RESULT ENCOUNTER NOTE
Conner  I have reviewed your recent test results:    -Normal red blood cell (hgb) levels, normal white blood cell count and normal platelet levels.  -PSA (prostate specific antigen) test is normal.  This indicates a low likelihood of prostate cancer.  ADVISE: rechecking this in 1 year.  -LDL(bad) cholesterol level is elevated which can increase your heart disease risk.  A diet high in fat and simple carbohydrates, genetics and being overweight can contribute to this. ADVISE: exercising 150 minutes of aerobic exercise per week (30 minutes for 5 days per week or 50 minutes for 3 days per week are options) and eating a low saturated fat/low carbohydrate diet are helpful to improve this. In 12 months we should recheck this level.  Based on your cardiovascular risk score (below) you do meet the recommendations to start statin therapy (recommended if your risk is above 7.5%).  Please work diligently on diet and exercise efforts and we will recheck this next year.  -Liver and gallbladder tests are normal (ALT,AST, Alk phos, bilirubin), kidney function is normal (Cr, GFR), sodium is normal, potassium is normal, calcium is normal, glucose is normal.  -TSH (thyroid stimulating hormone) level is normal which indicates normal thyroid function.  -Microalbumin (urine protein) test is normal.  ADVISE: rechecking this annually.    For additional lab test information, [email or web address] is an excellent reference.     If you have any questions please do not hesitate to contact our office via phone ([phone]) or MyChart.    Healthy regards,     Rose Webb MBA, MS, PA-C  [facility] [place]    The 10-year ASCVD risk score (Joseph SCOTT, et al., 2019) is: 14.4%    Values used to calculate the score:      Age: 67 years      Sex: Male      Is Non- : No      Diabetic: No      Tobacco smoker: No      Systolic Blood Pressure: 130 mmHg      Is BP treated: No      HDL Cholesterol: 55 mg/dL      Total  Cholesterol: 225 mg/dL

## 2023-10-25 ENCOUNTER — TRANSFERRED RECORDS (OUTPATIENT)
Dept: HEALTH INFORMATION MANAGEMENT | Facility: CLINIC | Age: 68
End: 2023-10-25
Payer: COMMERCIAL

## 2023-10-30 ENCOUNTER — PATIENT OUTREACH (OUTPATIENT)
Dept: GASTROENTEROLOGY | Facility: CLINIC | Age: 68
End: 2023-10-30
Payer: COMMERCIAL

## 2023-11-13 ENCOUNTER — OFFICE VISIT (OUTPATIENT)
Dept: FAMILY MEDICINE | Facility: CLINIC | Age: 68
End: 2023-11-13
Payer: COMMERCIAL

## 2023-11-13 VITALS
WEIGHT: 223.3 LBS | HEART RATE: 55 BPM | RESPIRATION RATE: 16 BRPM | OXYGEN SATURATION: 97 % | SYSTOLIC BLOOD PRESSURE: 134 MMHG | TEMPERATURE: 98.8 F | BODY MASS INDEX: 33.84 KG/M2 | DIASTOLIC BLOOD PRESSURE: 82 MMHG | HEIGHT: 68 IN

## 2023-11-13 DIAGNOSIS — M25.561 RIGHT KNEE PAIN, UNSPECIFIED CHRONICITY: ICD-10-CM

## 2023-11-13 DIAGNOSIS — Z01.818 PREOP GENERAL PHYSICAL EXAM: Primary | ICD-10-CM

## 2023-11-13 LAB
ERYTHROCYTE [DISTWIDTH] IN BLOOD BY AUTOMATED COUNT: 12.7 % (ref 10–15)
HCT VFR BLD AUTO: 45.4 % (ref 40–53)
HGB BLD-MCNC: 14.5 G/DL (ref 13.3–17.7)
MCH RBC QN AUTO: 29.8 PG (ref 26.5–33)
MCHC RBC AUTO-ENTMCNC: 31.9 G/DL (ref 31.5–36.5)
MCV RBC AUTO: 93 FL (ref 78–100)
PLATELET # BLD AUTO: 221 10E3/UL (ref 150–450)
RBC # BLD AUTO: 4.86 10E6/UL (ref 4.4–5.9)
WBC # BLD AUTO: 5.2 10E3/UL (ref 4–11)

## 2023-11-13 PROCEDURE — 36415 COLL VENOUS BLD VENIPUNCTURE: CPT | Performed by: FAMILY MEDICINE

## 2023-11-13 PROCEDURE — 85027 COMPLETE CBC AUTOMATED: CPT | Performed by: FAMILY MEDICINE

## 2023-11-13 PROCEDURE — 99214 OFFICE O/P EST MOD 30 MIN: CPT | Performed by: FAMILY MEDICINE

## 2023-11-13 PROCEDURE — 80048 BASIC METABOLIC PNL TOTAL CA: CPT | Performed by: FAMILY MEDICINE

## 2023-11-13 SDOH — HEALTH STABILITY: PHYSICAL HEALTH: ON AVERAGE, HOW MANY DAYS PER WEEK DO YOU ENGAGE IN MODERATE TO STRENUOUS EXERCISE (LIKE A BRISK WALK)?: 2 DAYS

## 2023-11-13 ASSESSMENT — ANXIETY QUESTIONNAIRES
GAD7 TOTAL SCORE: 1
3. WORRYING TOO MUCH ABOUT DIFFERENT THINGS: NOT AT ALL
1. FEELING NERVOUS, ANXIOUS, OR ON EDGE: NOT AT ALL
4. TROUBLE RELAXING: SEVERAL DAYS
GAD7 TOTAL SCORE: 1
IF YOU CHECKED OFF ANY PROBLEMS ON THIS QUESTIONNAIRE, HOW DIFFICULT HAVE THESE PROBLEMS MADE IT FOR YOU TO DO YOUR WORK, TAKE CARE OF THINGS AT HOME, OR GET ALONG WITH OTHER PEOPLE: SOMEWHAT DIFFICULT
7. FEELING AFRAID AS IF SOMETHING AWFUL MIGHT HAPPEN: NOT AT ALL
5. BEING SO RESTLESS THAT IT IS HARD TO SIT STILL: NOT AT ALL
2. NOT BEING ABLE TO STOP OR CONTROL WORRYING: NOT AT ALL
6. BECOMING EASILY ANNOYED OR IRRITABLE: NOT AT ALL

## 2023-11-13 ASSESSMENT — SOCIAL DETERMINANTS OF HEALTH (SDOH)
HOW OFTEN DO YOU ATTENT MEETINGS OF THE CLUB OR ORGANIZATION YOU BELONG TO?: NEVER
HOW OFTEN DO YOU ATTEND CHURCH OR RELIGIOUS SERVICES?: MORE THAN 4 TIMES PER YEAR
DO YOU BELONG TO ANY CLUBS OR ORGANIZATIONS SUCH AS CHURCH GROUPS UNIONS, FRATERNAL OR ATHLETIC GROUPS, OR SCHOOL GROUPS?: NO
HOW OFTEN DO YOU GET TOGETHER WITH FRIENDS OR RELATIVES?: ONCE A WEEK
IN A TYPICAL WEEK, HOW MANY TIMES DO YOU TALK ON THE PHONE WITH FAMILY, FRIENDS, OR NEIGHBORS?: ONCE A WEEK

## 2023-11-13 ASSESSMENT — PATIENT HEALTH QUESTIONNAIRE - PHQ9
SUM OF ALL RESPONSES TO PHQ QUESTIONS 1-9: 5
SUM OF ALL RESPONSES TO PHQ QUESTIONS 1-9: 5
10. IF YOU CHECKED OFF ANY PROBLEMS, HOW DIFFICULT HAVE THESE PROBLEMS MADE IT FOR YOU TO DO YOUR WORK, TAKE CARE OF THINGS AT HOME, OR GET ALONG WITH OTHER PEOPLE: SOMEWHAT DIFFICULT

## 2023-11-13 ASSESSMENT — PAIN SCALES - GENERAL: PAINLEVEL: NO PAIN (0)

## 2023-11-13 ASSESSMENT — LIFESTYLE VARIABLES
HOW OFTEN DO YOU HAVE SIX OR MORE DRINKS ON ONE OCCASION: NEVER
AUDIT-C TOTAL SCORE: 2
HOW MANY STANDARD DRINKS CONTAINING ALCOHOL DO YOU HAVE ON A TYPICAL DAY: 1 OR 2
SKIP TO QUESTIONS 9-10: 1
HOW OFTEN DO YOU HAVE A DRINK CONTAINING ALCOHOL: 2-4 TIMES A MONTH

## 2023-11-13 NOTE — PATIENT INSTRUCTIONS
Preparing for Your Surgery  Getting started  A nurse will call you to review your health history and instructions. They will give you an arrival time based on your scheduled surgery time. Please be ready to share:  Your doctor's clinic name and phone number  Your medical, surgical, and anesthesia history  A list of allergies and sensitivities  A list of medicines, including herbal treatments and over-the-counter drugs  Whether the patient has a legal guardian (ask how to send us the papers in advance)  Please tell us if you're pregnant--or if there's any chance you might be pregnant. Some surgeries may injure a fetus (unborn baby), so they require a pregnancy test. Surgeries that are safe for a fetus don't always need a test, and you can choose whether to have one.   If you have a child who's having surgery, please ask for a copy of Preparing for Your Child's Surgery.    Preparing for surgery  Within 10 to 30 days of surgery: Have a pre-op exam (sometimes called an H&P, or History and Physical). This can be done at a clinic or pre-operative center.  If you're having a , you may not need this exam. Talk to your care team.  At your pre-op exam, talk to your care team about all medicines you take. If you need to stop any medicines before surgery, ask when to start taking them again.  We do this for your safety. Many medicines can make you bleed too much during surgery. Some change how well surgery (anesthesia) drugs work.  Call your insurance company to let them know you're having surgery. (If you don't have insurance, call [phone].)  Call your clinic if there's any change in your health. This includes signs of a cold or flu (sore throat, runny nose, cough, rash, fever). It also includes a scrape or scratch near the surgery site.  If you have questions on the day of surgery, call your hospital or surgery center.  Eating and drinking guidelines  For your safety: Unless your surgeon tells you otherwise,  follow the guidelines below.  Eat and drink as usual until 8 hours before you arrive for surgery. After that, no food or milk.  Drink clear liquids until 2 hours before you arrive. These are liquids you can see through, like water, Gatorade, and Propel Water. They also include plain black coffee and tea (no cream or milk), candy, and breath mints. You can spit out gum when you arrive.  If you drink alcohol: Stop drinking it the night before surgery.  If your care team tells you to take medicine on the morning of surgery, it's okay to take it with a sip of water.  Preventing infection  Shower or bathe the night before and morning of your surgery. Follow the instructions your clinic gave you. (If no instructions, use regular soap.)  Don't shave or clip hair near your surgery site. We'll remove the hair if needed.  Don't smoke or vape the morning of surgery. You may chew nicotine gum up to 2 hours before surgery. A nicotine patch is okay.  Note: Some surgeries require you to completely quit smoking and nicotine. Check with your surgeon.  Your care team will make every effort to keep you safe from infection. We will:  Clean our hands often with soap and water (or an alcohol-based hand rub).  Clean the skin at your surgery site with a special soap that kills germs.  Give you a special gown to keep you warm. (Cold raises the risk of infection.)  Wear special hair covers, masks, gowns and gloves during surgery.  Give antibiotic medicine, if prescribed. Not all surgeries need antibiotics.  What to bring on the day of surgery  Photo ID and insurance card  Copy of your health care directive, if you have one  Glasses and hearing aids (bring cases)  You can't wear contacts during surgery  Inhaler and eye drops, if you use them (tell us about these when you arrive)  CPAP machine or breathing device, if you use them  A few personal items, if spending the night  If you have . . .  A pacemaker, ICD (cardiac defibrillator) or other  implant: Bring the ID card.  An implanted stimulator: Bring the remote control.  A legal guardian: Bring a copy of the certified (court-stamped) guardianship papers.  Please remove any jewelry, including body piercings. Leave jewelry and other valuables at home.  If you're going home the day of surgery  You must have a responsible adult drive you home. They should stay with you overnight as well.  If you don't have someone to stay with you, and you aren't safe to go home alone, we may keep you overnight. Insurance often won't pay for this.  After surgery  If it's hard to control your pain or you need more pain medicine, please call your surgeon's office.  Questions?   If you have any questions for your care team, list them here: _________________________________________________________________________________________________________________________________________________________________________ ____________________________________ ____________________________________ ____________________________________  For informational purposes only. Not to replace the advice of your health care provider. Copyright   2003, 2019 [facility]. All rights reserved. Clinically reviewed by Clare Colin MD. SMARTworks 774425 - REV 12/22.

## 2023-11-13 NOTE — COMMUNITY RESOURCES LIST (ENGLISH)
11/13/2023   [facility]  N/A  For questions about this resource list or additional care needs, please contact your primary care clinic or care manager.  Phone: [phone]   Email: N/A   Address: [address] [place] [address]   Hours: N/A        Hotlines and Helplines       Hotline - Housing crisis  1  [facility] (Main Office) - Emergency Services Distance: 14.16 miles      Phone/Virtual   [address] [place] [address]  Language: English  Hours: Mon - Sun Open 24 Hours   Phone: (207) 106-2338 Email: info@[employer].Fashiolista Website: http://Idyl[employer].Fashiolista     2  [facility] Distance: 19.02 miles      Phone/Virtual   [address] [place] [address]  Language: English  Hours: Mon - Sun Open 24 Hours   Phone: (573) 485-1673 Email: info@[employer] Website: http://www.[employer].org          Housing       Coordinated Entry access point  3  [facility] [place] ([facility] - Housing Services Distance: 19.68 miles      In-Person   [address] [place] [address]  Language: English  Hours: Mon - Fri 9:00 AM - 5:00 PM  Fees: Free   Phone: (102) 909-7064 Email: housing@[facility].org Website: http://www.[facility].org/housing     4  [facility] Distance: 23.99 miles      In-Person, Phone/Virtual   [address] [place] [address]  Language: English, Puerto Rican  Hours: Mon - Fri 8:30 AM - 4:30 PM  Fees: Free   Phone: (472) 992-8706 Email: info@[facility].org Website: https://www.[facility].org/locations/[facility]-clinic/     Drop-in center or day shelter  5  [facility] Distance: 19.99 miles      In-Person   [address] [place] [address]  Language: English  Hours: Mon - Fri 12:00 PM - 3:00 PM  Fees: Free   Phone: (811) 410-2874 Email: Minetta Brook@[facility].[employer] Website: http://peacehousecommunity.org/     6  [facility]  Center Distance: 20.23 miles      In-Person   [address] [place] [address]  Language: English, Bolivian, Latvian  Hours: Mon - Sat 7:00 AM - 3:00 PM  Fees: Free, Self Pay   Phone: (147) 267-4444 Email: info@[employer] Website: https://www.[employer]/locations/opportunity-center/     Housing search assistance  7  [facility] Distance: 9.15 miles      In-Person   [address] [place] [address]  Language: English  Hours: Mon - Fri 8:30 AM - 4:00 PM  Fees: Free   Phone: (513) 184-4663 Email: reception@[employer] Website: http://www.[employer].Ulabox     8  [facility] [place] [employer] - Rental Homes for Future Homebuyers Program Distance: 11.08 miles      Phone/Virtual   [address] [place] [address]  Language: English  Hours: Mon - Fri 8:00 AM - 4:30 PM  Fees: Free   Phone: (863) 804-9943 Email: hra@[facility] Website: https://www.[facility]/hra/[place]-housing-and-wwkwlzopgepbn-yhgreokxh-gdp     Shelter for individuals  9  [employer] [facility] Distance: 7.01 miles      In-Person   [address] [place] [address]  Language: English, Latvian  Hours: Mon - Fri 8:00 AM - 4:30 PM  Fees: Free   Phone: (992) 970-9638 Email: info@[facility].org Website: https://www.capagency.org/     10  [facility] Distance: 19.86 miles      In-Person   [address] [place] [address]  Language: English  Hours: Mon - Sun Open 24 Hours  Fees: Free   Phone: (351) 635-9990 Email: communications@[facility].org Website: https://[facility].org/oursaviourshousing/          Important Numbers & Websites       Emergency Services   911  City Services   311  Poison Control   (401) 627-4084  Suicide Prevention Lifeline   (924) 666-5945 (TALK)  Child Abuse Hotline   (180) 814-2141 (4-A-Child)  Sexual Assault Hotline   (584) 837-6508 (HOPE)  [facility]    (933) 898-4600 (RUNAWAY)  All-Options Talkline   (923) 364-9186  Substance Abuse Referral   (685) 445-3309 (HELP)

## 2023-11-13 NOTE — PROGRESS NOTES
[facility]  [address] [facility] [address]  Phone: [phone]  Primary Provider: Rose Webb  Pre-op Performing Provider: DC CARDENAS      PREOPERATIVE EVALUATION:  Today's date: 11/13/2023    Conner is a 67 year old male who presents for a preoperative evaluation.      11/13/2023     3:39 PM   Additional Questions   Roomed by ESTELITA Matute   Accompanied by Self       Surgical Information:  Surgery/Procedure: Right Knee Surgery  Surgery Location: [facility]  Surgeon: Dr. Aguilar  Surgery Date: 11/17/2023  Time of Surgery: TBD  Where patient plans to recover: At home with family  Fax number for surgical facility: [phone]    Assessment & Plan     The proposed surgical procedure is considered LOW risk.    Preop general physical exam   Undergoing right knee arthroscopic surgery for right knee pain .  Physical activity greater than 4 mets .  Low perioperative cardiac risk .  Patient optimized for surgery .    - CBC with platelets  - Basic metabolic panel  (Ca, Cl, CO2, Creat, Gluc, K, Na, BUN)    Right knee pain, unspecified chronicity  Undergoing surgery for .     - No identified additional risk factors other than previously addressed        RECOMMENDATION:  APPROVAL GIVEN to proceed with proposed procedure, without further diagnostic evaluation.    Subjective       HPI related to upcoming procedure:           11/13/2023     4:01 PM   Preop Questions   1. Have you ever had a heart attack or stroke? No   2. Have you ever had surgery on your heart or blood vessels, such as a stent placement, a coronary artery bypass, or surgery on an artery in your head, neck, heart, or legs? No   3. Do you have chest pain with activity? No   4. Do you have a history of  heart failure? No   5. Do you currently have a cold, bronchitis or symptoms of other infection? No   6. Do you have a cough, shortness of breath, or wheezing? No   7. Do you or anyone in your  family have previous history of blood clots? No   8. Do you or does anyone in your family have a serious bleeding problem such as prolonged bleeding following surgeries or cuts? No   9. Have you ever had problems with anemia or been told to take iron pills? No   10. Have you had any abnormal blood loss such as black, tarry or bloody stools? No   11. Have you ever had a blood transfusion? No   12. Are you willing to have a blood transfusion if it is medically needed before, during, or after your surgery? NO -    13. Have you or any of your relatives ever had problems with anesthesia? No   14. Do you have sleep apnea, excessive snoring or daytime drowsiness? No   15. Do you have any artifical heart valves or other implanted medical devices like a pacemaker, defibrillator, or continuous glucose monitor? No   16. Do you have artificial joints? No   17. Are you allergic to latex? No     Health Care Directive:  Patient does not have a Health Care Directive or Living Will:   Preoperative Review of :      Review of Systems  CONSTITUTIONAL: NEGATIVE for fever, chills, change in weight  INTEGUMENTARY/SKIN: NEGATIVE for worrisome rashes, moles or lesions  EYES: NEGATIVE for vision changes or irritation  ENT/MOUTH: NEGATIVE for ear, mouth and throat problems  RESP: NEGATIVE for significant cough or SOB  CV: NEGATIVE for chest pain, palpitations or peripheral edema  GI: NEGATIVE for nausea, abdominal pain, heartburn, or change in bowel habits  : NEGATIVE for frequency, dysuria, or hematuria  MUSCULOSKELETAL: NEGATIVE for significant arthralgias or myalgia  NEURO: NEGATIVE for weakness, dizziness or paresthesias  ENDOCRINE: NEGATIVE for temperature intolerance, skin/hair changes  HEME: NEGATIVE for bleeding problems  PSYCHIATRIC: NEGATIVE for changes in mood or affect    Patient Active Problem List    Diagnosis Date Noted    Tubular adenoma of colon - 2018 - 5 year follow up recommended. 05/11/2023     Priority: Medium     Controlled substance agreement signed - Diazepam 5 mg #30 every 6 months for severe RLS flares and anxiety attacks 05/11/2023     Priority: Medium    Anxiety attack 05/11/2023     Priority: Medium    Family history of ischemic heart disease 05/11/2023     Priority: Medium    Insomnia, unspecified type 05/11/2023     Priority: Medium    CKD (chronic kidney disease) stage 2, GFR 60-89 ml/min 05/11/2022     Priority: Medium    Elevated BP without diagnosis of hypertension 05/11/2022     Priority: Medium    Restless legs syndrome (RLS) 06/21/2018     Priority: Medium    Hyperlipidemia LDL goal <100 10/22/2013     Priority: Medium     The 10-year ASCVD risk score (Austinestephania LOPES Jr, et al., 2013) is: 7.9%    Values used to calculate the score:      Age: 62 years      Sex: Male      Is Non- : No      Diabetic: No      Tobacco smoker: No      Systolic Blood Pressure: 120 mmHg      Is BP treated: No      HDL Cholesterol: 62 mg/dL      Total Cholesterol: 220 mg/dL       Advanced directives, counseling/discussion 08/25/2011     Priority: Medium     Advance Directive Problem List Overview:   Name Relationship Phone    Primary Health Care Agent            Alternative Health Care Agent          Discussed advance care planning with patient; information given to patient to review. 8/25/2011     Left message for patient to call back to arrange facilitation if desired for completion of Advanced Care Directive. Joanne Almaraz LPN/Advanced Healthcare Planning Facilitator  9/9/11            Esophageal reflux      Priority: Medium    Family history of malignant neoplasm of prostate 05/10/2007     Priority: Medium     Father at 53        Past Medical History:   Diagnosis Date    Basal cell carcinoma     Esophageal reflux     Family history of malignant neoplasm of prostate 5/10/2007    Father at 53     Past Surgical History:   Procedure Laterality Date    COLONOSCOPY  2018    5 year follow up recommended    SURGICAL  "HISTORY OF -       tendon repair in right hand    [facility] COLONOSCOPY THRU STOMA, DIAGNOSTIC  06/15/2007    Hyperplastic Polyp.  Needs repeat in 5 years     Current Outpatient Medications   Medication Sig Dispense Refill    diazepam (VALIUM) 5 MG tablet Take 1 tablet (5 mg) by mouth daily as needed for anxiety (OR restless leg flare) 30 tablet 0    diphenhydrAMINE HCl, Sleep, (ZZZQUIL) 25 MG CAPS Take 1 each by mouth At Bedtime Take per package instructions (Patient not taking: Reported on 2023)         No Known Allergies     Social History     Tobacco Use    Smoking status: Former     Years: 30     Types: Cigarettes     Quit date: 2001     Years since quittin.9    Smokeless tobacco: Never   Substance Use Topics    Alcohol use: Yes     Comment: 2x per month       History   Drug Use No         Objective     BP (!) 140/82 (BP Location: Right arm, Patient Position: Sitting, Cuff Size: Adult Large)   Pulse 55   Temp 98.8  F (37.1  C) (Oral)   Resp 16   Ht 1.727 m (5' 8\")   Wt 101.3 kg (223 lb 4.8 oz)   SpO2 97%   BMI 33.95 kg/m      Physical Exam    GENERAL APPEARANCE: healthy, alert and no distress     EYES: EOMI,  PERRL     HENT: ear canals and TM's normal and nose and mouth without ulcers or lesions     NECK: no adenopathy, no asymmetry, masses, or scars and thyroid normal to palpation     RESP: lungs clear to auscultation - no rales, rhonchi or wheezes     CV: regular rates and rhythm, normal S1 S2, no S3 or S4 and no murmur, click or rub     ABDOMEN:  soft, nontender, no HSM or masses and bowel sounds normal     MS: extremities normal- no gross deformities noted, no evidence of inflammation in joints, FROM in all extremities.     SKIN: no suspicious lesions or rashes     NEURO: Normal strength and tone, sensory exam grossly normal, mentation intact and speech normal     PSYCH: mentation appears normal. and affect normal/bright     LYMPHATICS: No cervical adenopathy    Recent Labs   Lab Test " 06/07/23  0714 05/11/22  1453   HGB 14.6 14.6    238    139   POTASSIUM 4.1 4.5   CR 1.01 0.83        Diagnostics:  No labs were ordered during this visit.  Labs pending at this time.  Results will be reviewed when available.   No EKG required for low risk surgery (cataract, skin procedure, breast biopsy, etc).    Revised Cardiac Risk Index (RCRI):  The patient has the following serious cardiovascular risks for perioperative complications:   - No serious cardiac risks = 0 points     RCRI Interpretation: 0 points: Class I (very low risk - 0.4% complication rate)         Signed Electronically by: Cary Joseph MD  Copy of this evaluation report is provided to requesting physician.      Answers submitted by the patient for this visit:  Patient Health Questionnaire (Submitted on 11/13/2023)  If you checked off any problems, how difficult have these problems made it for you to do your work, take care of things at home, or get along with other people?: Somewhat difficult  PHQ9 TOTAL SCORE: 5

## 2023-11-14 LAB
ANION GAP SERPL CALCULATED.3IONS-SCNC: 10 MMOL/L (ref 7–15)
BUN SERPL-MCNC: 21 MG/DL (ref 8–23)
CALCIUM SERPL-MCNC: 9.5 MG/DL (ref 8.8–10.2)
CHLORIDE SERPL-SCNC: 104 MMOL/L (ref 98–107)
CREAT SERPL-MCNC: 0.89 MG/DL (ref 0.67–1.17)
DEPRECATED HCO3 PLAS-SCNC: 25 MMOL/L (ref 22–29)
EGFRCR SERPLBLD CKD-EPI 2021: >90 ML/MIN/1.73M2
GLUCOSE SERPL-MCNC: 85 MG/DL (ref 70–99)
POTASSIUM SERPL-SCNC: 4.5 MMOL/L (ref 3.4–5.3)
SODIUM SERPL-SCNC: 139 MMOL/L (ref 135–145)

## 2023-11-28 ENCOUNTER — TRANSFERRED RECORDS (OUTPATIENT)
Dept: HEALTH INFORMATION MANAGEMENT | Facility: CLINIC | Age: 68
End: 2023-11-28
Payer: COMMERCIAL

## 2023-11-30 ENCOUNTER — TRANSFERRED RECORDS (OUTPATIENT)
Dept: HEALTH INFORMATION MANAGEMENT | Facility: CLINIC | Age: 68
End: 2023-11-30
Payer: COMMERCIAL

## 2023-12-07 ENCOUNTER — TELEPHONE (OUTPATIENT)
Dept: DERMATOLOGY | Facility: CLINIC | Age: 68
End: 2023-12-07
Payer: COMMERCIAL

## 2023-12-07 NOTE — TELEPHONE ENCOUNTER
[facility] Call Center    Phone Message    May a detailed message be left on voicemail: yes     Reason for Call: Symptoms or Concerns     If patient has red-flag symptoms, warm transfer to triage line    Current symptom or concern: bleeding skin lesion    Symptoms have been present for:  1 month(s)    Has patient previously been seen for this? No    By : NA    Date: NA    Are there any new or worsening symptoms? Yes: New, Pt has a Hx of skin cancer    Action Taken: Other: OX Derm    Travel Screening: Not Applicable

## 2023-12-08 NOTE — TELEPHONE ENCOUNTER
Spoke with pt wife and scheduled sooner appointment.    Thank you,  Sinai ORELLANA RN  Dermatology   [phone]

## 2023-12-08 NOTE — TELEPHONE ENCOUNTER
Patient Contact    Attempt # 1    Was call answered?  No    Left message on answering machine for patient to call back.    Sinai ORELLANA RN  Dermatology   [phone]

## 2023-12-13 ENCOUNTER — OFFICE VISIT (OUTPATIENT)
Dept: DERMATOLOGY | Facility: CLINIC | Age: 68
End: 2023-12-13
Payer: COMMERCIAL

## 2023-12-13 DIAGNOSIS — L30.1 DYSHIDROTIC ECZEMA: ICD-10-CM

## 2023-12-13 DIAGNOSIS — L82.0 INFLAMED SEBORRHEIC KERATOSIS: ICD-10-CM

## 2023-12-13 DIAGNOSIS — L28.1 PRURIGO NODULARIS: ICD-10-CM

## 2023-12-13 DIAGNOSIS — M67.449 DIGITAL MUCOUS CYST: Primary | ICD-10-CM

## 2023-12-13 PROCEDURE — 17110 DESTRUCTION B9 LES UP TO 14: CPT | Performed by: STUDENT IN AN ORGANIZED HEALTH CARE EDUCATION/TRAINING PROGRAM

## 2023-12-13 PROCEDURE — 99214 OFFICE O/P EST MOD 30 MIN: CPT | Mod: 25 | Performed by: STUDENT IN AN ORGANIZED HEALTH CARE EDUCATION/TRAINING PROGRAM

## 2023-12-13 RX ORDER — CLOBETASOL PROPIONATE 0.5 MG/G
CREAM TOPICAL 2 TIMES DAILY
Qty: 60 G | Refills: 3 | Status: SHIPPED | OUTPATIENT
Start: 2023-12-13

## 2023-12-13 ASSESSMENT — PAIN SCALES - GENERAL: PAINLEVEL: NO PAIN (0)

## 2023-12-13 NOTE — PROGRESS NOTES
[facility] Dermatology Note    Encounter Date: Dec 13, 2023    Dermatology Problem List:    ______________________________________    Impression/Plan:  Conner was seen today for skin check.    Diagnoses and all orders for this visit:    Digital mucous cyst  -On multiple fingers including his toe  - Recommended Ortho consultation for definitive treatment    Prurigo nodularis  -Posterior scalp, states that a bug bit him there  - Denies picking at it, but states that he checks it to see if it is still there with some frequency  - Reassured there were no retained bug parts    Inflamed seborrheic keratosis  -     DESTRUCT BENIGN LESION, UP TO 14  -Left temple x 1  - See procedure note    Dyshidrotic eczema  -     clobetasol (TEMOVATE) 0.05 % external cream; Apply topically 2 times daily  -Itching in between the webspace of the finger that occurs intermittently once every few months  - Patient requesting that I cut the area of skin out  - Recommend just applying clobetasol cream twice daily when he has a flare of itching      Cryotherapy procedure note: After verbal consent and discussion of risks and benefits including but no limited to dyspigmentation/scar, blister, and pain, 1 ISK l temple was(were) treated with 1-2mm freeze border for 2 cycles with liquid nitrogen. Post cryotherapy instructions were provided.     Follow-up PRN .       Staff Involved:  Staff Only    Jatin Cordon MD   of Dermatology  Department of Dermatology  [facility]      CC:   Chief Complaint   Patient presents with    Skin Check       History of Present Illness:  Mr. Nick Villalobos is a 67 year old male who presents as a return patient.    Presents today with multiple concerns including a scaly spot on his left temple, a papule on his posterior scalp that he checks frequently at the site of a previous bug bite  - A raised lesion on his left third toe that he is  self-conscious about (a family member pointed it out when they were on a boat), he has had similar lesions on multiple fingers in the past.  Finally he has intense intermittent itching in the webspace of his ring finger intermittently    Labs:      Physical exam:  Vitals: There were no vitals taken for this visit.  GEN: well developed, well-nourished, in no acute distress, in a pleasant mood.     SKIN: Hoang phototype 1  - Focused examination of the face, hands, and fee was performed.  - Stuck on brown papules on trunk and extremities   -Skin colored papule posterior neck.  - Papule over the DIP of the left third toe  - No other lesions of concern on areas examined.     Past Medical History:   Past Medical History:   Diagnosis Date    Basal cell carcinoma     Esophageal reflux     Family history of malignant neoplasm of prostate 5/10/2007    Father at 53     Past Surgical History:   Procedure Laterality Date    COLONOSCOPY  2018    5 year follow up recommended    SURGICAL HISTORY OF -       tendon repair in right hand    [facility] COLONOSCOPY THRU STOMA, DIAGNOSTIC  06/15/2007    Hyperplastic Polyp.  Needs repeat in 5 years       Social History:   reports that he quit smoking about 21 years ago. His smoking use included cigarettes. He has never used smokeless tobacco. He reports current alcohol use. He reports that he does not use drugs.    Family History:  Family History   Problem Relation Age of Onset    Cerebrovascular Disease Mother         at 64    Prostate Cancer Father     Cancer - colorectal Father     C.A.D. Father          at 67, presumed MI    Hypertension Father     Brain Hemorrhage Brother         trauma    Skin Cancer Paternal Grandmother        Medications:  Current Outpatient Medications   Medication Sig Dispense Refill    clobetasol (TEMOVATE) 0.05 % external cream Apply topically 2 times daily 60 g 3    diazepam (VALIUM) 5 MG tablet Take 1 tablet (5 mg) by mouth daily as needed for anxiety (OR  restless leg flare) (Patient not taking: Reported on 12/13/2023) 30 tablet 0    diphenhydrAMINE HCl, Sleep, (ZZZQUIL) 25 MG CAPS Take 1 each by mouth At Bedtime Take per package instructions (Patient not taking: Reported on 11/13/2023)       No Known Allergies

## 2023-12-13 NOTE — PATIENT INSTRUCTIONS
Apply clobetasol cream twice daily as needed for itching    CRYOTHERAPY    What is it?  Use of a very cold liquid, such as liquid nitrogen, to freeze and destroy abnormal skin cells that need to be removed    What should I expect?  Tenderness and redness  A small blister that might grow and fill with dark purple blood.  More than one treatment may be needed if the lesions do not go away.    How do I care for the treated area?  Gently wash the area with your hands when bathing.  Use a thin layer of VaselineÆ to help with healing.   The area should heal within 7-10 days.  Do not use an antibiotic or NeosporinÆ ointment.   You may take acetaminophen (TylenolÆ) for pain.     Call your Doctor if you have:  Severe pain  Signs of infection (warmth, redness, cloudy yellow drainage, and or a bad smell)  Questions or concerns

## 2023-12-13 NOTE — LETTER
12/13/2023         RE: Nick Villalobos  [address]  [facility] [id number]        Dear Colleague,    Thank you for referring your patient, Nick Villalobos, to the [facility] [place]. Please see a copy of my visit note below.    [facility]ology Note    Encounter Date: Dec 13, 2023    Dermatology Problem List:    ______________________________________    Impression/Plan:  Conner was seen today for skin check.    Diagnoses and all orders for this visit:    Digital mucous cyst  -On multiple fingers including his toe  - Recommended Ortho consultation for definitive treatment    Prurigo nodularis  -Posterior scalp, states that a bug bit him there  - Denies picking at it, but states that he checks it to see if it is still there with some frequency  - Reassured there were no retained bug parts    Inflamed seborrheic keratosis  -     DESTRUCT BENIGN LESION, UP TO 14  -Left temple x 1  - See procedure note    Dyshidrotic eczema  -     clobetasol (TEMOVATE) 0.05 % external cream; Apply topically 2 times daily  -Itching in between the webspace of the finger that occurs intermittently once every few months  - Patient requesting that I cut the area of skin out  - Recommend just applying clobetasol cream twice daily when he has a flare of itching      Cryotherapy procedure note: After verbal consent and discussion of risks and benefits including but no limited to dyspigmentation/scar, blister, and pain, 1 ISK l temple was(were) treated with 1-2mm freeze border for 2 cycles with liquid nitrogen. Post cryotherapy instructions were provided.     Follow-up PRN .       Staff Involved:  Staff Only    Jatin Cordon MD   of Dermatology  Department of Dermatology  [facility]      CC:   Chief Complaint   Patient presents with     Skin Check       History of Present Illness:  Mr. Nick Villalobos is a 67 year old  male who presents as a return patient.    Presents today with multiple concerns including a scaly spot on his left temple, a papule on his posterior scalp that he checks frequently at the site of a previous bug bite  - A raised lesion on his left third toe that he is self-conscious about (a family member pointed it out when they were on a boat), he has had similar lesions on multiple fingers in the past.  Finally he has intense intermittent itching in the webspace of his ring finger intermittently    Labs:      Physical exam:  Vitals: There were no vitals taken for this visit.  GEN: well developed, well-nourished, in no acute distress, in a pleasant mood.     SKIN: Hoang phototype 1  - Focused examination of the face, hands, and fee was performed.  - Stuck on brown papules on trunk and extremities   -Skin colored papule posterior neck.  - Papule over the DIP of the left third toe  - No other lesions of concern on areas examined.     Past Medical History:   Past Medical History:   Diagnosis Date     Basal cell carcinoma      Esophageal reflux      Family history of malignant neoplasm of prostate 5/10/2007    Father at 53     Past Surgical History:   Procedure Laterality Date     COLONOSCOPY  2018    5 year follow up recommended     SURGICAL HISTORY OF -       tendon repair in right hand     [facility]  [facility]    Hyperplastic Polyp.  Needs repeat in 5 years       Social History:   reports that he quit smoking about 21 years ago. His smoking use included cigarettes. He has never used smokeless tobacco. He reports current alcohol use. He reports that he does not use drugs.    Family History:  Family History   Problem Relation Age of Onset     Cerebrovascular Disease Mother         at 64     Prostate Cancer Father      Cancer - colorectal Father      C.A.D. Father          at 67, presumed MI     Hypertension Father      Brain Hemorrhage Brother         trauma     Skin Cancer  Paternal Grandmother        Medications:  Current Outpatient Medications   Medication Sig Dispense Refill     clobetasol (TEMOVATE) 0.05 % external cream Apply topically 2 times daily 60 g 3     diazepam (VALIUM) 5 MG tablet Take 1 tablet (5 mg) by mouth daily as needed for anxiety (OR restless leg flare) (Patient not taking: Reported on 12/13/2023) 30 tablet 0     diphenhydrAMINE HCl, Sleep, (ZZZQUIL) 25 MG CAPS Take 1 each by mouth At Bedtime Take per package instructions (Patient not taking: Reported on 11/13/2023)       No Known Allergies              Again, thank you for allowing me to participate in the care of your patient.        Sincerely,        Jatin Cordon MD

## 2023-12-27 DIAGNOSIS — G25.81 RESTLESS LEGS SYNDROME (RLS): ICD-10-CM

## 2023-12-27 DIAGNOSIS — F41.0 ANXIETY ATTACK: ICD-10-CM

## 2023-12-27 DIAGNOSIS — Z79.899 CONTROLLED SUBSTANCE AGREEMENT SIGNED: ICD-10-CM

## 2023-12-27 NOTE — TELEPHONE ENCOUNTER
Patient is calling for a refill on his diazepam 5mg. Pharmacy desired attached. Please fill as able.

## 2023-12-28 RX ORDER — DIAZEPAM 5 MG
5 TABLET ORAL DAILY PRN
Qty: 30 TABLET | Refills: 0 | Status: SHIPPED | OUTPATIENT
Start: 2023-12-28

## 2024-08-03 ENCOUNTER — HEALTH MAINTENANCE LETTER (OUTPATIENT)
Age: 69
End: 2024-08-03

## 2024-10-08 ENCOUNTER — OFFICE VISIT (OUTPATIENT)
Dept: FAMILY MEDICINE | Facility: CLINIC | Age: 69
End: 2024-10-08
Payer: COMMERCIAL

## 2024-10-08 ENCOUNTER — TELEPHONE (OUTPATIENT)
Dept: FAMILY MEDICINE | Facility: CLINIC | Age: 69
End: 2024-10-08

## 2024-10-08 VITALS
RESPIRATION RATE: 20 BRPM | BODY MASS INDEX: 33.59 KG/M2 | HEART RATE: 51 BPM | HEIGHT: 68 IN | OXYGEN SATURATION: 97 % | DIASTOLIC BLOOD PRESSURE: 76 MMHG | WEIGHT: 221.6 LBS | TEMPERATURE: 97.5 F | SYSTOLIC BLOOD PRESSURE: 130 MMHG

## 2024-10-08 DIAGNOSIS — G25.81 RESTLESS LEGS SYNDROME (RLS): ICD-10-CM

## 2024-10-08 DIAGNOSIS — F41.0 ANXIETY ATTACK: ICD-10-CM

## 2024-10-08 DIAGNOSIS — Z79.899 CONTROLLED SUBSTANCE AGREEMENT SIGNED: ICD-10-CM

## 2024-10-08 DIAGNOSIS — J01.00 ACUTE NON-RECURRENT MAXILLARY SINUSITIS: Primary | ICD-10-CM

## 2024-10-08 DIAGNOSIS — R05.1 ACUTE COUGH: ICD-10-CM

## 2024-10-08 PROCEDURE — 99214 OFFICE O/P EST MOD 30 MIN: CPT | Performed by: NURSE PRACTITIONER

## 2024-10-08 RX ORDER — AZITHROMYCIN 250 MG/1
TABLET, FILM COATED ORAL
Qty: 6 TABLET | Refills: 0 | Status: SHIPPED | OUTPATIENT
Start: 2024-10-08

## 2024-10-08 RX ORDER — DIAZEPAM 5 MG/1
5 TABLET ORAL DAILY PRN
Qty: 30 TABLET | Refills: 0 | Status: SHIPPED | OUTPATIENT
Start: 2024-10-08

## 2024-10-08 NOTE — TELEPHONE ENCOUNTER
Medication Question or Refill    azithromycin (ZITHROMAX) 250 MG tablet     This one did NOT come into pharmacy??     What medication are you calling about (include dose and sig)?: Pt at pharmacy and 2 of the 3 RX's did not come through.  Looks like they were canceled or deleted.  Please and refill for pt    Preferred Pharmacy:     [id number] - [place] [address] AT [facility] [address] [address]  Phone: [phone] Fax: [phone]      Controlled Substance Agreement on file:   CSA -- Patient Level:     [Media Unavailable] Controlled Substance Agreement - Non - Opioid - Scan on 5/17/2022  9:36 AM: NON-OPIOID CONTROLLED SUBSTANCE AGREEMENT   [Media Unavailable] Controlled Substance Agreement - Non - Opioid - Scan on 9/16/2020 11:52 AM: NON-OPIOID CONTROLLED SUBSTANCE AGREEMENT       Who prescribed the medication?: shea    Do you need a refill? Yes    When did you use the medication last? na    Patient offered an appointment? No    Do you have any questions or concerns?  No      Could we send this information to you in [employer] or would you prefer to receive a phone call?:  ariana OSORIO

## 2024-10-08 NOTE — LETTER
[facility]  10/08/24  Patient: Nick Villalobos  YOB: 1955  Medical Record Number: [id number]                                                                                  Non-Opioid Controlled Substance Agreement    This is an agreement between you and your provider regarding safe and appropriate use of controlled substances prescribed by your care team. Controlled substances are?medicines that can cause physical and mental dependence (abuse).     There are strict laws about having and using these medicines. We here at [facility] are  committed to working with you in your efforts to get better. To support you in this work, we'll help you schedule regular office appointments for medicine refills. If we must cancel or change your appointment for any reason, we'll make sure you have enough medicine to last until your next appointment.     As a Provider, I will:   Listen carefully to your concerns while treating you with respect.   Recommend a treatment plan that I believe is in your best interest and may involve therapies other than medicine.    Talk with you often about the possible benefits and the risk of harm of any medicine that we prescribe for you.  Assess the safety of this medicine and check how well it works.    Provide a plan on how to taper (discontinue or go off) using this medicine if the decision is made to stop its use.      ::  As a Patient, I understand controlled substances:     Are prescribed by my care provider to help me function or work and manage my condition(s).?  Are strong medicines and can cause serious side effects.     Need to be taken exactly as prescribed.?Combining controlled substances with certain medicines or chemicals (such as illegal drugs, alcohol, sedatives, sleeping pills, and benzodiazepines) can be dangerous or even fatal.? If I stop taking my medicines suddenly, I may have severe withdrawal symptoms.     The risks,  benefits, and side effects of these medicine(s) were explained to me. I agree that:    I will take part in other treatments as advised by my care team. This may be psychiatry or counseling, physical therapy, behavioral therapy, group treatment or a referral to specialist.    I will keep all my appointments and understand this is part of the monitoring of controlled substances.?My care team may require an office visit for EVERY controlled substance refill. If I miss appointments or don t follow instructions, my care team may stop my medicine    I will take my medicines as prescribed. I will not change the dose or schedule unless my care team tells me to. There will be no refills if I run out early.      I may be asked to come to the clinic and complete a urine drug test or complete a pill count. If I don t give a urine sample or participate in a pill count, the care team may stop my medicine.    I will only receive controlled substance prescriptions from this clinic. If I am treated by another provider, I will tell them that I am taking controlled substances and that I have a treatment agreement with this provider. I will inform my [facility] care team within one business day if I am given a prescription for any controlled substance by another healthcare provider. My [facility] care team can contact other providers and pharmacists about my use of any medicines.    It is up to me to make sure that I don't run out of my medicines on weekends or holidays.?If my care team is willing to refill my prescription without a visit, I must request refills only during office hours. Refills may take up to 3 business days to process. I will use one pharmacy to fill all my controlled substance prescriptions. I will notify the clinic about any changes to my insurance or medicine availability.    I am responsible for my prescriptions. If the medicine/prescription is lost, stolen or destroyed, it will not be replaced.?I  also agree not to share controlled substance medicines with anyone.     I am aware I should not use any illegal or recreational drugs. I agree not to drink alcohol unless my care team says I can.     If I enroll in the [place] [facility] Cannabis program, I will tell my care team before my next refill.    I will tell my care team right away if I become pregnant, have a new medical problem treated outside of my regular clinic, or have a change in my medicines.     I understand that this medicine can affect my thinking, judgment and reaction time.? Alcohol and drugs affect the brain and body, which can affect the safety of my driving. Being under the influence of alcohol or drugs can affect my decision-making, behaviors, personal safety and the safety of others. Driving while impaired (DWI) can occur if a person is driving, operating or in physical control of a car, motorcycle, boat, snowmobile, ATV, motorbike, off-road vehicle or any other motor vehicle ([place] Statute 169A.20). I understand the risk if I choose to drive or operate any vehicle or machinery.    I understand that if I do not follow any of the conditions above, my prescriptions or treatment may be stopped or changed.   I agree that my provider, clinic care team and pharmacy may work with any city, state or federal law enforcement agency that investigates the misuse, sale or other diversion of my controlled medicine. I will allow my provider to discuss my care with, or share a copy of, this agreement with any other treating provider, pharmacy or emergency room where I receive care.     I have read this agreement and have asked questions about anything I did not understand.    ________________________________________________________  Patient Signature - Nick Villalobos     ___________________                   Date     ________________________________________________________  Provider Signature - Ondina Rojas, CNP       ___________________                    Date     ________________________________________________________  Witness Signature (required if provider not present while patient signing)          ___________________                   Date

## 2024-10-08 NOTE — TELEPHONE ENCOUNTER
Placed call to patient to advise that rx was sent to pharmacy again. Pt reports he was able to pick it up.

## 2025-04-15 DIAGNOSIS — Z79.899 CONTROLLED SUBSTANCE AGREEMENT SIGNED: ICD-10-CM

## 2025-04-15 DIAGNOSIS — G25.81 RESTLESS LEGS SYNDROME (RLS): ICD-10-CM

## 2025-04-15 DIAGNOSIS — F41.0 ANXIETY ATTACK: ICD-10-CM

## 2025-04-15 RX ORDER — DIAZEPAM 5 MG/1
5 TABLET ORAL DAILY PRN
Qty: 30 TABLET | Refills: 0 | OUTPATIENT
Start: 2025-04-15

## 2025-04-15 NOTE — TELEPHONE ENCOUNTER
Denied.  I have not seen patient since 2023.  This is controlled substance and needs visit within 6 months per [place] guidelines.  Needs OV (video ok if preferred) if needing refill      Rose Webb MBA, MS, PA-C  [facility]

## 2025-04-15 NOTE — TELEPHONE ENCOUNTER
"Called and spoke with patient.     States he sees Rose once a year and then gets 2 refills per year.   Tried to see Rose last time, but \"she wasn't there\" - so saw Ondina Rojas, OMID.     10/8/2024 - med check with [facility] - valium refilled   12/27/23 - refill encounter - valium refilled  5/11/23 - physical - valium refilled    Patient agreed to schedule physical -     Future Appointments   Date Time Provider Department Center   7/23/2025 10:00 AM Rose Webb PA-C [facility]        Routing to provider to review and advise.     ANAY JACOME RN on 4/15/2025 at 3:22 PM   [facility]           "

## 2025-04-15 NOTE — TELEPHONE ENCOUNTER
Ondina - patient requesting fill of valium.        Rose Webb MBA, MS, PA-C  [facility]

## 2025-04-17 RX ORDER — DIAZEPAM 5 MG/1
5 TABLET ORAL DAILY PRN
Qty: 30 TABLET | Refills: 0 | Status: SHIPPED | OUTPATIENT
Start: 2025-04-17

## 2025-07-02 ENCOUNTER — TELEPHONE (OUTPATIENT)
Dept: FAMILY MEDICINE | Facility: CLINIC | Age: 70
End: 2025-07-02
Payer: COMMERCIAL

## 2025-07-02 NOTE — TELEPHONE ENCOUNTER
Call back # [phone]    Francisco ASHRAF from [facility] medical examiners office calls to request Rose Morgan to sign the patient's death certificate.     Patient passed away 5/5/25 of cardiac arrest, ems attempted cpr and he was pronounced dead on scene.      Medical examiner states wife was adamant that Rose Morgan saw the patient each time he came to the clinic, was getting medication filled through her and she should sign the death certificate.      Medical examiner explained to his wife that he has his medical records and that Rose morgan had not seen the patient in the last year that is more than likely the reason a provider would not sign it. Examiner states he is not sure this request was ever sent to Rose Morgan.   Examiner is aware that he saw one [facility] provider in 2024 and one in 2023    Explained there is not an encounter in his chart regarding a death certificate.    Explained will forward to her but she is not back in office until July 14th.     Last date pcp saw patient saw  5/11/2023

## 2025-07-03 NOTE — TELEPHONE ENCOUNTER
Being that the only time I saw him was for a sinus infection I don't feel comfortable signing certificate. Also no medical record to review. We could check with medical examiner in the case that Rose also doesn't want to sign, what are next steps in that case?    Ondina Rojas, CNP

## 2025-07-03 NOTE — TELEPHONE ENCOUNTER
Routing to Ondina Rojas to review - you saw the patient only once 10/8/24 for sinus infection.     Also routing back to Rose morgan for her input

## 2025-07-03 NOTE — TELEPHONE ENCOUNTER
Patient has not seen Rose in over two years. We can not guarantee she will sign upon her return. Nursing is correct she is not back until July 14th.   May be more beneficial to send to a provider who has seen him in clinic within the last year.          Ondina Joiner, DIAN (covering for Rose Webb-HANK)

## 2025-07-07 NOTE — TELEPHONE ENCOUNTER
Called [facility] medical examiner.     They will route death certificate to Rose JACOME RN on 7/7/2025 at 10:59 AM   [facility]

## 2025-07-07 NOTE — TELEPHONE ENCOUNTER
I can certainly certify if they can route to my work que.  Please inform medical examiner's office       Rose Webb MBA, MS, PA-C  [facility]